# Patient Record
Sex: FEMALE | Race: WHITE | NOT HISPANIC OR LATINO | Employment: OTHER | URBAN - METROPOLITAN AREA
[De-identification: names, ages, dates, MRNs, and addresses within clinical notes are randomized per-mention and may not be internally consistent; named-entity substitution may affect disease eponyms.]

---

## 2017-05-03 ENCOUNTER — LAB (OUTPATIENT)
Dept: LAB | Facility: HOSPITAL | Age: 82
End: 2017-05-03

## 2017-05-03 DIAGNOSIS — E78.49 OTHER HYPERLIPIDEMIA: Primary | ICD-10-CM

## 2017-05-03 LAB
ALBUMIN SERPL-MCNC: 4.3 G/DL (ref 3.5–5.2)
ALBUMIN/GLOB SERPL: 1.4 G/DL
ALP SERPL-CCNC: 86 U/L (ref 39–117)
ALT SERPL W P-5'-P-CCNC: 19 U/L (ref 1–33)
ANION GAP SERPL CALCULATED.3IONS-SCNC: 13.4 MMOL/L
AST SERPL-CCNC: 27 U/L (ref 1–32)
BASOPHILS # BLD AUTO: 0.02 10*3/MM3 (ref 0–0.2)
BASOPHILS NFR BLD AUTO: 0.4 % (ref 0–1.5)
BILIRUB SERPL-MCNC: 0.5 MG/DL (ref 0.1–1.2)
BUN BLD-MCNC: 15 MG/DL (ref 8–23)
BUN/CREAT SERPL: 18.3 (ref 7–25)
CALCIUM SPEC-SCNC: 10.2 MG/DL (ref 8.6–10.5)
CHLORIDE SERPL-SCNC: 103 MMOL/L (ref 98–107)
CHOLEST SERPL-MCNC: 175 MG/DL (ref 0–200)
CO2 SERPL-SCNC: 25.6 MMOL/L (ref 22–29)
CREAT BLD-MCNC: 0.82 MG/DL (ref 0.57–1)
DEPRECATED RDW RBC AUTO: 42.1 FL (ref 37–54)
EOSINOPHIL # BLD AUTO: 0.21 10*3/MM3 (ref 0–0.7)
EOSINOPHIL NFR BLD AUTO: 4 % (ref 0.3–6.2)
ERYTHROCYTE [DISTWIDTH] IN BLOOD BY AUTOMATED COUNT: 12.5 % (ref 11.7–13)
GFR SERPL CREATININE-BSD FRML MDRD: 67 ML/MIN/1.73
GLOBULIN UR ELPH-MCNC: 3.1 GM/DL
GLUCOSE BLD-MCNC: 91 MG/DL (ref 65–99)
HCT VFR BLD AUTO: 39.6 % (ref 35.6–45.5)
HDLC SERPL-MCNC: 67 MG/DL (ref 40–60)
HGB BLD-MCNC: 12.9 G/DL (ref 11.9–15.5)
IMM GRANULOCYTES # BLD: 0 10*3/MM3 (ref 0–0.03)
IMM GRANULOCYTES NFR BLD: 0 % (ref 0–0.5)
LDLC SERPL CALC-MCNC: 88 MG/DL (ref 0–100)
LDLC/HDLC SERPL: 1.32 {RATIO}
LYMPHOCYTES # BLD AUTO: 1.78 10*3/MM3 (ref 0.9–4.8)
LYMPHOCYTES NFR BLD AUTO: 34 % (ref 19.6–45.3)
MCH RBC QN AUTO: 30.1 PG (ref 26.9–32)
MCHC RBC AUTO-ENTMCNC: 32.6 G/DL (ref 32.4–36.3)
MCV RBC AUTO: 92.3 FL (ref 80.5–98.2)
MONOCYTES # BLD AUTO: 0.31 10*3/MM3 (ref 0.2–1.2)
MONOCYTES NFR BLD AUTO: 5.9 % (ref 5–12)
NEUTROPHILS # BLD AUTO: 2.92 10*3/MM3 (ref 1.9–8.1)
NEUTROPHILS NFR BLD AUTO: 55.7 % (ref 42.7–76)
PLATELET # BLD AUTO: 322 10*3/MM3 (ref 140–500)
PMV BLD AUTO: 9.4 FL (ref 6–12)
POTASSIUM BLD-SCNC: 4.3 MMOL/L (ref 3.5–5.2)
PROT SERPL-MCNC: 7.4 G/DL (ref 6–8.5)
RBC # BLD AUTO: 4.29 10*6/MM3 (ref 3.9–5.2)
SODIUM BLD-SCNC: 142 MMOL/L (ref 136–145)
TRIGL SERPL-MCNC: 99 MG/DL (ref 0–150)
VLDLC SERPL-MCNC: 19.8 MG/DL (ref 5–40)
WBC NRBC COR # BLD: 5.24 10*3/MM3 (ref 4.5–10.7)

## 2017-05-03 PROCEDURE — 85025 COMPLETE CBC W/AUTO DIFF WBC: CPT

## 2017-05-03 PROCEDURE — 80053 COMPREHEN METABOLIC PANEL: CPT

## 2017-05-03 PROCEDURE — 36415 COLL VENOUS BLD VENIPUNCTURE: CPT

## 2017-05-03 PROCEDURE — 80061 LIPID PANEL: CPT

## 2017-05-12 ENCOUNTER — TRANSCRIBE ORDERS (OUTPATIENT)
Dept: ADMINISTRATIVE | Facility: HOSPITAL | Age: 82
End: 2017-05-12

## 2017-05-12 DIAGNOSIS — Z12.31 ENCOUNTER FOR SCREENING MAMMOGRAM FOR MALIGNANT NEOPLASM OF BREAST: Primary | ICD-10-CM

## 2017-05-22 ENCOUNTER — HOSPITAL ENCOUNTER (OUTPATIENT)
Dept: MAMMOGRAPHY | Facility: HOSPITAL | Age: 82
Discharge: HOME | End: 2017-05-22
Attending: FAMILY MEDICINE | Admitting: FAMILY MEDICINE

## 2017-05-22 DIAGNOSIS — Z12.31 ENCOUNTER FOR SCREENING MAMMOGRAM FOR MALIGNANT NEOPLASM OF BREAST: ICD-10-CM

## 2017-05-22 PROCEDURE — 77063 BREAST TOMOSYNTHESIS BI: CPT

## 2017-05-22 PROCEDURE — G0202 SCR MAMMO BI INCL CAD: HCPCS

## 2017-06-07 RX ORDER — CYCLOBENZAPRINE HCL 10 MG
TABLET ORAL
Qty: 90 TABLET | Refills: 0 | Status: SHIPPED | OUTPATIENT
Start: 2017-06-07 | End: 2017-07-24 | Stop reason: SDUPTHER

## 2017-07-17 ENCOUNTER — OFFICE VISIT (OUTPATIENT)
Dept: FAMILY MEDICINE CLINIC | Facility: CLINIC | Age: 82
End: 2017-07-17

## 2017-07-17 VITALS
BODY MASS INDEX: 33.77 KG/M2 | DIASTOLIC BLOOD PRESSURE: 74 MMHG | OXYGEN SATURATION: 97 % | TEMPERATURE: 98.3 F | HEIGHT: 64 IN | SYSTOLIC BLOOD PRESSURE: 130 MMHG | HEART RATE: 76 BPM | WEIGHT: 197.8 LBS

## 2017-07-17 DIAGNOSIS — E78.5 HYPERLIPIDEMIA LDL GOAL <130: Primary | ICD-10-CM

## 2017-07-17 DIAGNOSIS — M47.818 SI JOINT ARTHRITIS: ICD-10-CM

## 2017-07-17 DIAGNOSIS — D49.2 ABNORMAL SKIN GROWTH: ICD-10-CM

## 2017-07-17 PROCEDURE — 99213 OFFICE O/P EST LOW 20 MIN: CPT | Performed by: FAMILY MEDICINE

## 2017-07-17 NOTE — PROGRESS NOTES
Chief Complaint and HPI  I have reviewed the patient's medical history in detail and updated the comp  Hyperlipidemia (follow up -no problem with Lipitor); Osteoarthritis (Meloxicam withoutmproblem); and Rash (both arms)      Review of Systems   Constitutional: Negative for chills, fatigue, fever and unexpected weight change.   HENT: Negative for ear pain, hearing loss, sinus pressure, sore throat and tinnitus.    Eyes: Negative for pain, discharge and redness.   Respiratory: Negative for cough, shortness of breath and wheezing.    Cardiovascular: Negative for chest pain, palpitations and leg swelling.   Gastrointestinal: Negative for abdominal pain, constipation, diarrhea and nausea.   Endocrine: Negative for cold intolerance and heat intolerance.   Genitourinary: Negative for difficulty urinating, flank pain and urgency.   Musculoskeletal: Negative for back pain, joint swelling and myalgias.   Skin: Negative for rash and wound.   Allergic/Immunologic: Negative for environmental allergies and food allergies.   Neurological: Negative for dizziness, seizures, numbness and headaches.   Hematological: Negative for adenopathy. Does not bruise/bleed easily.   Psychiatric/Behavioral: Negative for decreased concentration, dysphoric mood and sleep disturbance. The patient is not nervous/anxious.    All other systems reviewed and are negative.      Physical Exam   Constitutional: She appears well-developed and well-nourished.   Cardiovascular: Normal rate, regular rhythm, normal heart sounds and intact distal pulses.    Pulmonary/Chest: Effort normal and breath sounds normal.   Skin:   Larm with red irritrd 1cm2 lesion   Vitals reviewed.      Procedures    Assessment:   Diagnosis Plan   1. Hyperlipidemia LDL goal <130     2. Abnormal skin growth     3. SI joint arthritis         Plan:  No orders of the defined types were placed in this encounter.      Requested Prescriptions      No prescriptions requested or ordered  in this encounter       All tests and consults since last visit reviewed with patient    No Follow-up on file.

## 2017-07-24 RX ORDER — MELOXICAM 15 MG/1
15 TABLET ORAL DAILY
Qty: 90 TABLET | Refills: 1 | Status: SHIPPED | OUTPATIENT
Start: 2017-07-24 | End: 2018-09-25

## 2017-07-24 RX ORDER — ATORVASTATIN CALCIUM 10 MG/1
10 TABLET, FILM COATED ORAL DAILY
Qty: 90 TABLET | Refills: 1 | Status: SHIPPED | OUTPATIENT
Start: 2017-07-24 | End: 2018-03-29 | Stop reason: SDUPTHER

## 2017-07-24 RX ORDER — CYCLOBENZAPRINE HCL 10 MG
10 TABLET ORAL 3 TIMES DAILY
Qty: 90 TABLET | Refills: 0 | Status: SHIPPED | OUTPATIENT
Start: 2017-07-24 | End: 2017-10-18 | Stop reason: SDUPTHER

## 2017-08-07 ENCOUNTER — PROCEDURE VISIT (OUTPATIENT)
Dept: FAMILY MEDICINE CLINIC | Facility: CLINIC | Age: 82
End: 2017-08-07

## 2017-08-07 VITALS
WEIGHT: 197.4 LBS | BODY MASS INDEX: 33.7 KG/M2 | HEIGHT: 64 IN | TEMPERATURE: 98.1 F | SYSTOLIC BLOOD PRESSURE: 134 MMHG | DIASTOLIC BLOOD PRESSURE: 60 MMHG

## 2017-08-07 DIAGNOSIS — D49.2 ABNORMAL SKIN GROWTH: Primary | ICD-10-CM

## 2017-08-07 PROCEDURE — 11300 SHAVE SKIN LESION 0.5 CM/<: CPT | Performed by: FAMILY MEDICINE

## 2017-08-07 NOTE — PROGRESS NOTES
Subjective.../ HPI  I have reviewed the patient's medical history in detail and updated the computerized patient record.    Subjective: Shanique Martinez is a 81 y.o. female presents here today for-    Mass (removal from left arm)      Family History   Problem Relation Age of Onset   • Heart disease Mother    • Heart disease Father    • Cancer Sister    • Stroke Sister    • No Known Problems Brother    • No Known Problems Daughter    • No Known Problems Son    • No Known Problems Maternal Grandmother    • No Known Problems Paternal Grandmother    • No Known Problems Maternal Aunt    • No Known Problems Paternal Aunt    • BRCA 1/2 Neg Hx    • Breast cancer Neg Hx    • Colon cancer Neg Hx    • Endometrial cancer Neg Hx    • Ovarian cancer Neg Hx        Social History     Social History   • Marital status:      Spouse name: N/A   • Number of children: N/A   • Years of education: N/A     Occupational History   • Not on file.     Social History Main Topics   • Smoking status: Never Smoker   • Smokeless tobacco: Never Used   • Alcohol use No   • Drug use: No   • Sexual activity: Not on file     Other Topics Concern   • Not on file     Social History Narrative       Review of Systems   Constitutional: Negative for chills, fatigue, fever and unexpected weight change.   HENT: Negative for ear pain, hearing loss, sinus pressure, sore throat and tinnitus.    Eyes: Negative for pain, discharge and redness.   Respiratory: Negative for cough, shortness of breath and wheezing.    Cardiovascular: Negative for chest pain, palpitations and leg swelling.   Gastrointestinal: Negative for abdominal pain, constipation, diarrhea and nausea.   Endocrine: Negative for cold intolerance and heat intolerance.   Genitourinary: Negative for difficulty urinating, flank pain and urgency.   Musculoskeletal: Negative for back pain, joint swelling and myalgias.   Skin: Negative for rash and wound.   Allergic/Immunologic: Negative for  environmental allergies and food allergies.   Neurological: Negative for dizziness, seizures, numbness and headaches.   Hematological: Negative for adenopathy. Does not bruise/bleed easily.   Psychiatric/Behavioral: Negative for decreased concentration, dysphoric mood and sleep disturbance. The patient is not nervous/anxious.    All other systems reviewed and are negative.      Physical Exam   Constitutional: She appears well-developed and well-nourished.   Cardiovascular: Normal rate, regular rhythm, normal heart sounds and intact distal pulses.    Pulmonary/Chest: Effort normal and breath sounds normal.   Skin:   L forearm witjh 1.5cm2 shinny lesion   Vitals reviewed.    Biopsy  Date/Time: 8/7/2017 2:22 PM  Performed by: MARIA LUISA LINDSAY  Authorized by: MARIA LUISA LINDSAY   Preparation: Patient was prepped and draped in the usual sterile fashion.  Local anesthesia used: yes    Anesthesia:  Local anesthesia used: yes  Local Anesthetic: lidocaine 1% with epinephrine  Patient tolerance: Patient tolerated the procedure well with no immediate complications  Comments: L forearm 1.5 cm2 shiny lesion shave bx and specimen sent to lab            Shanique was seen today for mass.    Diagnoses and all orders for this visit:    Abnormal skin growth    Other orders  -     Biopsy      Requested Prescriptions      No prescriptions requested or ordered in this encounter       Results Review:    REVIEWED AND DISCUSSED CLINICAL RESULTS WITH PATIENT    No Follow-up on file.

## 2017-08-14 ENCOUNTER — TELEPHONE (OUTPATIENT)
Dept: FAMILY MEDICINE CLINIC | Facility: CLINIC | Age: 82
End: 2017-08-14

## 2017-08-14 LAB
DX ICD CODE: NORMAL
DX ICD CODE: NORMAL
PATH REPORT.FINAL DX SPEC: NORMAL
PATH REPORT.GROSS SPEC: NORMAL
PATH REPORT.SITE OF ORIGIN SPEC: NORMAL
PATHOLOGIST NAME: NORMAL
PAYMENT PROCEDURE: NORMAL

## 2017-08-14 NOTE — TELEPHONE ENCOUNTER
----- Message from Lucia Juares sent at 8/14/2017 11:01 AM EDT -----  [phone]    BIOPSY RESULTS    PT AWARE DR IS NOT IN THE OFFICE TODAY AND TO ALLOW 24-48 HOURS BEFORE A CALL BACK

## 2017-08-17 ENCOUNTER — TELEPHONE (OUTPATIENT)
Dept: FAMILY MEDICINE CLINIC | Facility: CLINIC | Age: 82
End: 2017-08-17

## 2017-08-17 NOTE — TELEPHONE ENCOUNTER
----- Message from Lucia Juares sent at 8/17/2017 10:17 AM EDT -----  [phone]    BIOPSY RESULTS     2ND CALL PT IS AWARE THAT THE DR WAS NOT HERE Monday AND OUT OF THE OFFICE TODAY

## 2017-10-19 RX ORDER — CYCLOBENZAPRINE HCL 10 MG
TABLET ORAL
Qty: 90 TABLET | Refills: 0 | Status: SHIPPED | OUTPATIENT
Start: 2017-10-19 | End: 2019-06-25 | Stop reason: SDUPTHER

## 2018-03-22 DIAGNOSIS — E78.5 HYPERLIPIDEMIA LDL GOAL <130: Primary | ICD-10-CM

## 2018-03-22 DIAGNOSIS — M47.818 SI JOINT ARTHRITIS: ICD-10-CM

## 2018-03-22 DIAGNOSIS — Z79.899 ENCOUNTER FOR LONG-TERM (CURRENT) USE OF MEDICATIONS: ICD-10-CM

## 2018-03-22 DIAGNOSIS — Z00.00 HEALTH MAINTENANCE EXAMINATION: ICD-10-CM

## 2018-03-27 LAB
ALBUMIN SERPL-MCNC: 4.6 G/DL (ref 3.5–5.2)
ALBUMIN/GLOB SERPL: 1.7 G/DL
ALP SERPL-CCNC: 102 U/L (ref 39–117)
ALT SERPL-CCNC: 17 U/L (ref 1–33)
AST SERPL-CCNC: 23 U/L (ref 1–32)
BASOPHILS # BLD AUTO: 0.02 10*3/MM3 (ref 0–0.2)
BASOPHILS NFR BLD AUTO: 0.3 % (ref 0–1.5)
BILIRUB SERPL-MCNC: 0.4 MG/DL (ref 0.1–1.2)
BUN SERPL-MCNC: 16 MG/DL (ref 8–23)
BUN/CREAT SERPL: 18.4 (ref 7–25)
CALCIUM SERPL-MCNC: 10.6 MG/DL (ref 8.6–10.5)
CHLORIDE SERPL-SCNC: 102 MMOL/L (ref 98–107)
CHOLEST SERPL-MCNC: 179 MG/DL (ref 0–200)
CO2 SERPL-SCNC: 26 MMOL/L (ref 22–29)
CREAT SERPL-MCNC: 0.87 MG/DL (ref 0.57–1)
EOSINOPHIL # BLD AUTO: 0.18 10*3/MM3 (ref 0–0.7)
EOSINOPHIL NFR BLD AUTO: 3.1 % (ref 0.3–6.2)
ERYTHROCYTE [DISTWIDTH] IN BLOOD BY AUTOMATED COUNT: 12.8 % (ref 11.7–13)
FT4I SERPL CALC-MCNC: 1.3 (ref 1.2–4.9)
GFR SERPLBLD CREATININE-BSD FMLA CKD-EPI: 62 ML/MIN/1.73
GFR SERPLBLD CREATININE-BSD FMLA CKD-EPI: 76 ML/MIN/1.73
GLOBULIN SER CALC-MCNC: 2.7 GM/DL
GLUCOSE SERPL-MCNC: 94 MG/DL (ref 65–99)
HBA1C MFR BLD: 5.3 % (ref 4.8–5.6)
HCT VFR BLD AUTO: 41.2 % (ref 35.6–45.5)
HDLC SERPL-MCNC: 70 MG/DL (ref 40–60)
HGB BLD-MCNC: 13.3 G/DL (ref 11.9–15.5)
IMM GRANULOCYTES # BLD: 0 10*3/MM3 (ref 0–0.03)
IMM GRANULOCYTES NFR BLD: 0 % (ref 0–0.5)
LDLC SERPL CALC-MCNC: 89 MG/DL (ref 0–100)
LDLC/HDLC SERPL: 1.27 {RATIO}
LYMPHOCYTES # BLD AUTO: 1.79 10*3/MM3 (ref 0.9–4.8)
LYMPHOCYTES NFR BLD AUTO: 30.7 % (ref 19.6–45.3)
MCH RBC QN AUTO: 30.9 PG (ref 26.9–32)
MCHC RBC AUTO-ENTMCNC: 32.3 G/DL (ref 32.4–36.3)
MCV RBC AUTO: 95.8 FL (ref 80.5–98.2)
MONOCYTES # BLD AUTO: 0.4 10*3/MM3 (ref 0.2–1.2)
MONOCYTES NFR BLD AUTO: 6.8 % (ref 5–12)
NEUTROPHILS # BLD AUTO: 3.45 10*3/MM3 (ref 1.9–8.1)
NEUTROPHILS NFR BLD AUTO: 59.1 % (ref 42.7–76)
PLATELET # BLD AUTO: 360 10*3/MM3 (ref 140–500)
POTASSIUM SERPL-SCNC: 5.1 MMOL/L (ref 3.5–5.2)
PROT SERPL-MCNC: 7.3 G/DL (ref 6–8.5)
RBC # BLD AUTO: 4.3 10*6/MM3 (ref 3.9–5.2)
SODIUM SERPL-SCNC: 143 MMOL/L (ref 136–145)
T3RU NFR SERPL: 22 % (ref 24–39)
T4 SERPL-MCNC: 5.8 UG/DL (ref 4.5–12)
TRIGL SERPL-MCNC: 101 MG/DL (ref 0–150)
TSH SERPL DL<=0.005 MIU/L-ACNC: 4.43 UIU/ML (ref 0.45–4.5)
VLDLC SERPL CALC-MCNC: 20.2 MG/DL (ref 5–40)
WBC # BLD AUTO: 5.84 10*3/MM3 (ref 4.5–10.7)

## 2018-03-29 ENCOUNTER — OFFICE VISIT (OUTPATIENT)
Dept: INTERNAL MEDICINE | Facility: CLINIC | Age: 83
End: 2018-03-29

## 2018-03-29 VITALS
HEART RATE: 79 BPM | OXYGEN SATURATION: 98 % | HEIGHT: 64 IN | DIASTOLIC BLOOD PRESSURE: 74 MMHG | BODY MASS INDEX: 33.63 KG/M2 | SYSTOLIC BLOOD PRESSURE: 144 MMHG | TEMPERATURE: 98.3 F | WEIGHT: 197 LBS

## 2018-03-29 DIAGNOSIS — E78.5 HYPERLIPIDEMIA LDL GOAL <130: Primary | ICD-10-CM

## 2018-03-29 DIAGNOSIS — M47.818 SI JOINT ARTHRITIS: ICD-10-CM

## 2018-03-29 PROCEDURE — 99213 OFFICE O/P EST LOW 20 MIN: CPT | Performed by: FAMILY MEDICINE

## 2018-03-29 RX ORDER — ATORVASTATIN CALCIUM 10 MG/1
10 TABLET, FILM COATED ORAL DAILY
Qty: 90 TABLET | Refills: 1 | Status: SHIPPED | OUTPATIENT
Start: 2018-03-29 | End: 2018-06-28 | Stop reason: SDUPTHER

## 2018-03-29 RX ORDER — ATORVASTATIN CALCIUM 10 MG/1
10 TABLET, FILM COATED ORAL DAILY
Qty: 90 TABLET | Refills: 1 | Status: SHIPPED | OUTPATIENT
Start: 2018-03-29 | End: 2018-03-29 | Stop reason: SDUPTHER

## 2018-03-29 NOTE — PROGRESS NOTES
CC:lipids ,arthritis.L wrist    Subjective.../HPI    requesting refills of medications    I have reviewed the patient's medical history in detail and updated the computerized patient record. 1) Shanique has a history of chronic hyperlipidemia and has been well controlled on current medications.  Patient reports has had hyperlipidemia for 5 years. She is tolerating medications without side effect.  Hyperlipidemia labs will be reviewed today   .  2) arthritis- on meloxicam    Past Medical History:   Diagnosis Date   • Arthritis    • Cancer    • History of bone density study 2008   • History of mammogram 11/01/2013   • History of mammogram 09/23/2011   • Hyperlipidemia    • Hypertension     HBP with hypertension unspecified       Past Surgical History:   Procedure Laterality Date   • COLONOSCOPY  2006   • HYSTERECTOMY         Family History   Problem Relation Age of Onset   • Heart disease Mother    • Heart disease Father    • Cancer Sister    • Stroke Sister    • No Known Problems Brother    • No Known Problems Daughter    • No Known Problems Son    • No Known Problems Maternal Grandmother    • No Known Problems Paternal Grandmother    • No Known Problems Maternal Aunt    • No Known Problems Paternal Aunt    • BRCA 1/2 Neg Hx    • Breast cancer Neg Hx    • Colon cancer Neg Hx    • Endometrial cancer Neg Hx    • Ovarian cancer Neg Hx        Social History     Social History   • Marital status:      Spouse name: N/A   • Number of children: N/A   • Years of education: N/A     Occupational History   • Not on file.     Social History Main Topics   • Smoking status: Never Smoker   • Smokeless tobacco: Never Used   • Alcohol use No   • Drug use: No   • Sexual activity: Not on file     Other Topics Concern   • Not on file     Social History Narrative   • No narrative on file       Most Recent Immunizations   Administered Date(s) Administered   • Flu Vaccine High Dose PF 65YR+ 10/07/2016   • Flu Vaccine Quad PF >18YRS  "09/08/2014   • Influenza, Unspecified 10/01/2013   • Pneumococcal Polysaccharide (PPSV23) 10/02/2012   • Zoster 02/06/2013       Review of Systems:   Review of Systems   Constitutional: Negative.    Respiratory: Negative.    Gastrointestinal: Negative.    Endocrine: Negative.    Genitourinary: Negative.    Musculoskeletal: Negative.    Skin: Negative.    Allergic/Immunologic: Negative.    Neurological: Negative.    Hematological: Negative.    Psychiatric/Behavioral: Negative.          Physical Exam   Constitutional: She is oriented to person, place, and time. She appears well-developed and well-nourished.   Cardiovascular: Normal rate, regular rhythm, normal heart sounds and intact distal pulses.    Pulmonary/Chest: Effort normal and breath sounds normal.   Abdominal: Soft.   Neurological: She is alert and oriented to person, place, and time.   Psychiatric: She has a normal mood and affect. Her behavior is normal.         Vital Signs     Vitals:    03/29/18 1605   BP: 144/74   BP Location: Left arm   Patient Position: Sitting   Cuff Size: Adult   Pulse: 79   Temp: 98.3 °F (36.8 °C)   TempSrc: Oral   SpO2: 98%   Weight: 89.4 kg (197 lb)   Height: 162.6 cm (64.02\")          Results Review:      REVIEWED AND DISCUSSED CLINICAL RESULTS WITH PATIENT      Requested Prescriptions      No prescriptions requested or ordered in this encounter         Current Outpatient Prescriptions:   •  aspirin 81 MG tablet, Take 81 mg by mouth Every Morning., Disp: , Rfl:   •  atorvastatin (LIPITOR) 10 MG tablet, Take 1 tablet by mouth Daily., Disp: 90 tablet, Rfl: 1  •  CALCIUM PO, Take 1 tablet by mouth Daily., Disp: , Rfl:   •  cyclobenzaprine (FLEXERIL) 10 MG tablet, TAKE 1 TABLET THREE TIMES A DAY (NEED TO BE SEEN), Disp: 90 tablet, Rfl: 0  •  meloxicam (MOBIC) 15 MG tablet, Take 1 tablet by mouth Daily., Disp: 90 tablet, Rfl: 1  •  prednisoLONE acetate (PRED FORTE) 1 % ophthalmic suspension, Apply 1 drop to eye Every Other Day. (at " bedtime), Disp: , Rfl:     Current Facility-Administered Medications:   •  bupivacaine (PF) (MARCAINE) 0.5 % injection 0.5 mL, 0.5 mL, Injection, Once, Germain Brady MD  •  triamcinolone acetonide (KENALOG-40) injection 40 mg, 40 mg, Intramuscular, Once, Germain Brady MD    Procedures          There are no diagnoses linked to this encounter.     No Follow-up on file.    Germain Brady M.D  03/29/18  4:10 PM

## 2018-05-14 ENCOUNTER — TRANSCRIBE ORDERS (OUTPATIENT)
Dept: ADMINISTRATIVE | Facility: HOSPITAL | Age: 83
End: 2018-05-14

## 2018-05-14 DIAGNOSIS — Z12.39 SCREENING BREAST EXAMINATION: Primary | ICD-10-CM

## 2018-06-05 ENCOUNTER — HOSPITAL ENCOUNTER (OUTPATIENT)
Dept: MAMMOGRAPHY | Facility: HOSPITAL | Age: 83
Discharge: HOME | End: 2018-06-05
Attending: FAMILY MEDICINE | Admitting: FAMILY MEDICINE

## 2018-06-05 DIAGNOSIS — Z12.39 SCREENING BREAST EXAMINATION: ICD-10-CM

## 2018-06-05 PROCEDURE — 77063 BREAST TOMOSYNTHESIS BI: CPT

## 2018-06-05 PROCEDURE — 77067 SCR MAMMO BI INCL CAD: CPT

## 2018-06-28 DIAGNOSIS — E78.5 HYPERLIPIDEMIA LDL GOAL <130: ICD-10-CM

## 2018-06-28 RX ORDER — ATORVASTATIN CALCIUM 10 MG/1
TABLET, FILM COATED ORAL
Qty: 90 TABLET | Refills: 1 | Status: SHIPPED | OUTPATIENT
Start: 2018-06-28 | End: 2020-06-30 | Stop reason: SDUPTHER

## 2018-09-14 DIAGNOSIS — E78.5 HYPERLIPIDEMIA LDL GOAL <130: Primary | ICD-10-CM

## 2018-09-14 DIAGNOSIS — R73.09 INCREASED GLUCOSE LEVEL: ICD-10-CM

## 2018-09-14 DIAGNOSIS — Z79.899 ENCOUNTER FOR LONG-TERM (CURRENT) USE OF MEDICATIONS: ICD-10-CM

## 2018-09-17 LAB
ALBUMIN SERPL-MCNC: 4.7 G/DL (ref 3.5–5.2)
ALBUMIN/GLOB SERPL: 2 G/DL
ALP SERPL-CCNC: 105 U/L (ref 39–117)
ALT SERPL-CCNC: 18 U/L (ref 1–33)
AST SERPL-CCNC: 21 U/L (ref 1–32)
BASOPHILS # BLD AUTO: 0.04 10*3/MM3 (ref 0–0.2)
BASOPHILS NFR BLD AUTO: 0.7 % (ref 0–1.5)
BILIRUB SERPL-MCNC: 0.4 MG/DL (ref 0.1–1.2)
BUN SERPL-MCNC: 18 MG/DL (ref 8–23)
BUN/CREAT SERPL: 23.1 (ref 7–25)
CALCIUM SERPL-MCNC: 9.9 MG/DL (ref 8.6–10.5)
CHLORIDE SERPL-SCNC: 103 MMOL/L (ref 98–107)
CHOLEST SERPL-MCNC: 180 MG/DL (ref 0–200)
CO2 SERPL-SCNC: 27.1 MMOL/L (ref 22–29)
CREAT SERPL-MCNC: 0.78 MG/DL (ref 0.57–1)
EOSINOPHIL # BLD AUTO: 0.21 10*3/MM3 (ref 0–0.7)
EOSINOPHIL NFR BLD AUTO: 3.7 % (ref 0.3–6.2)
ERYTHROCYTE [DISTWIDTH] IN BLOOD BY AUTOMATED COUNT: 12.5 % (ref 11.7–13)
GLOBULIN SER CALC-MCNC: 2.3 GM/DL
GLUCOSE SERPL-MCNC: 86 MG/DL (ref 65–99)
HBA1C MFR BLD: 5.5 % (ref 4.8–5.6)
HCT VFR BLD AUTO: 40.9 % (ref 35.6–45.5)
HDLC SERPL-MCNC: 72 MG/DL (ref 40–60)
HGB BLD-MCNC: 12.4 G/DL (ref 11.9–15.5)
IMM GRANULOCYTES # BLD: 0 10*3/MM3 (ref 0–0.03)
IMM GRANULOCYTES NFR BLD: 0 % (ref 0–0.5)
LDLC SERPL CALC-MCNC: 90 MG/DL (ref 0–100)
LDLC/HDLC SERPL: 1.25 {RATIO}
LYMPHOCYTES # BLD AUTO: 1.72 10*3/MM3 (ref 0.9–4.8)
LYMPHOCYTES NFR BLD AUTO: 30 % (ref 19.6–45.3)
MCH RBC QN AUTO: 28.8 PG (ref 26.9–32)
MCHC RBC AUTO-ENTMCNC: 30.3 G/DL (ref 32.4–36.3)
MCV RBC AUTO: 95.1 FL (ref 80.5–98.2)
MONOCYTES # BLD AUTO: 0.38 10*3/MM3 (ref 0.2–1.2)
MONOCYTES NFR BLD AUTO: 6.6 % (ref 5–12)
NEUTROPHILS # BLD AUTO: 3.38 10*3/MM3 (ref 1.9–8.1)
NEUTROPHILS NFR BLD AUTO: 59 % (ref 42.7–76)
PLATELET # BLD AUTO: 358 10*3/MM3 (ref 140–500)
POTASSIUM SERPL-SCNC: 4.5 MMOL/L (ref 3.5–5.2)
PROT SERPL-MCNC: 7 G/DL (ref 6–8.5)
RBC # BLD AUTO: 4.3 10*6/MM3 (ref 3.9–5.2)
SODIUM SERPL-SCNC: 141 MMOL/L (ref 136–145)
TRIGL SERPL-MCNC: 91 MG/DL (ref 0–150)
VLDLC SERPL CALC-MCNC: 18.2 MG/DL (ref 5–40)
WBC # BLD AUTO: 5.73 10*3/MM3 (ref 4.5–10.7)

## 2018-09-19 ENCOUNTER — RESULTS ENCOUNTER (OUTPATIENT)
Dept: INTERNAL MEDICINE | Facility: CLINIC | Age: 83
End: 2018-09-19

## 2018-09-19 DIAGNOSIS — Z79.899 ENCOUNTER FOR LONG-TERM (CURRENT) USE OF MEDICATIONS: ICD-10-CM

## 2018-09-19 DIAGNOSIS — E78.5 HYPERLIPIDEMIA LDL GOAL <130: ICD-10-CM

## 2018-09-19 DIAGNOSIS — R73.09 INCREASED GLUCOSE LEVEL: ICD-10-CM

## 2018-09-25 ENCOUNTER — OFFICE VISIT (OUTPATIENT)
Dept: INTERNAL MEDICINE | Facility: CLINIC | Age: 83
End: 2018-09-25

## 2018-09-25 VITALS
HEIGHT: 64 IN | BODY MASS INDEX: 34.49 KG/M2 | WEIGHT: 202 LBS | OXYGEN SATURATION: 96 % | HEART RATE: 77 BPM | DIASTOLIC BLOOD PRESSURE: 83 MMHG | SYSTOLIC BLOOD PRESSURE: 150 MMHG | TEMPERATURE: 97.9 F

## 2018-09-25 DIAGNOSIS — E78.5 HYPERLIPIDEMIA LDL GOAL <130: Primary | ICD-10-CM

## 2018-09-25 DIAGNOSIS — Z79.899 ENCOUNTER FOR LONG-TERM (CURRENT) DRUG USE: ICD-10-CM

## 2018-09-25 DIAGNOSIS — M19.90 ARTHRITIS: ICD-10-CM

## 2018-09-25 PROCEDURE — 99213 OFFICE O/P EST LOW 20 MIN: CPT | Performed by: FAMILY MEDICINE

## 2018-09-25 NOTE — PROGRESS NOTES
CC:lipids,arthritis    Subjective.../HPI  Patient present today with 1) Shanique has a history of chronic hyperlipidemia and has been well controlled on current medications.  Patient reports has had hyperlipidemia for 4 years. She is tolerating medications without side effect.  Hyperlipidemia labs will be reviewed today.  2) arthritis-good except for knees    I have reviewed the patient's medical history in detail and updated the computerized patient record.    Past Medical History:   Diagnosis Date   • Arthritis    • Cancer (CMS/[facility])    • History of bone density study 2008   • History of mammogram 11/01/2013   • History of mammogram 09/23/2011   • Hyperlipidemia    • Hypertension     HBP with hypertension unspecified       Past Surgical History:   Procedure Laterality Date   • COLONOSCOPY  2006   • HYSTERECTOMY         Family History   Problem Relation Age of Onset   • Heart disease Mother    • Heart disease Father    • Cancer Sister    • Stroke Sister    • No Known Problems Brother    • No Known Problems Daughter    • No Known Problems Son    • No Known Problems Maternal Grandmother    • No Known Problems Paternal Grandmother    • No Known Problems Maternal Aunt    • No Known Problems Paternal Aunt    • BRCA 1/2 Neg Hx    • Breast cancer Neg Hx    • Colon cancer Neg Hx    • Endometrial cancer Neg Hx    • Ovarian cancer Neg Hx        Social History     Social History   • Marital status:      Spouse name: N/A   • Number of children: N/A   • Years of education: N/A     Occupational History   • Not on file.     Social History Main Topics   • Smoking status: Never Smoker   • Smokeless tobacco: Never Used   • Alcohol use No   • Drug use: No   • Sexual activity: Not on file     Other Topics Concern   • Not on file     Social History Narrative   • No narrative on file       Most Recent Immunizations   Administered Date(s) Administered   • Flu Vaccine High Dose PF 65YR+ 10/07/2016   • Flu Vaccine Quad PF >18YRS  "09/08/2014   • Influenza, Unspecified 10/01/2013   • Pneumococcal Polysaccharide (PPSV23) 10/02/2012   • Zostavax 02/06/2013       Review of Systems:   Review of Systems   Constitutional: Negative.    HENT: Negative.    Eyes: Negative.    Respiratory: Negative.    Cardiovascular: Negative.    Gastrointestinal: Negative.    Endocrine: Negative.    Genitourinary: Negative.    Musculoskeletal: Negative.    Skin: Negative.    Allergic/Immunologic: Negative.    Neurological: Negative.    Hematological: Negative.    Psychiatric/Behavioral: Negative.          Physical Exam   Constitutional: She is oriented to person, place, and time. She appears well-developed and well-nourished.   Cardiovascular: Normal rate, regular rhythm and normal heart sounds.    Pulmonary/Chest: Effort normal and breath sounds normal.   Neurological: She is alert and oriented to person, place, and time.   Skin: Skin is warm and dry.   Psychiatric: She has a normal mood and affect. Her behavior is normal.   Vitals reviewed.        Vital Signs     Vitals:    09/25/18 1032   BP: 150/83   BP Location: Right arm   Patient Position: Sitting   Cuff Size: Small Adult   Pulse: 77   Temp: 97.9 °F (36.6 °C)   TempSrc: Oral   SpO2: 96%   Weight: 91.6 kg (202 lb)   Height: 162.6 cm (64.02\")          Results Review:      REVIEWED AND DISCUSSED LAB RESULTS WITH PATIENT      Requested Prescriptions     Signed Prescriptions Disp Refills   • diclofenac sodium (VOTAREN XR) 100 MG 24 hr tablet 90 tablet 3     Sig: Take 1 tablet by mouth Daily.         Current Outpatient Prescriptions:   •  aspirin 81 MG tablet, Take 81 mg by mouth Every Morning., Disp: , Rfl:   •  atorvastatin (LIPITOR) 10 MG tablet, TAKE 1 TABLET DAILY, Disp: 90 tablet, Rfl: 1  •  CALCIUM PO, Take 1 tablet by mouth Daily., Disp: , Rfl:   •  cyclobenzaprine (FLEXERIL) 10 MG tablet, TAKE 1 TABLET THREE TIMES A DAY (NEED TO BE SEEN), Disp: 90 tablet, Rfl: 0  •  prednisoLONE acetate (PRED FORTE) 1 % " ophthalmic suspension, Apply 1 drop to eye Every Other Day. (at bedtime), Disp: , Rfl:   •  diclofenac sodium (VOTAREN XR) 100 MG 24 hr tablet, Take 1 tablet by mouth Daily., Disp: 90 tablet, Rfl: 3    Current Facility-Administered Medications:   •  bupivacaine (PF) (MARCAINE) 0.5 % injection 0.5 mL, 0.5 mL, Injection, Once, Germain Brady MD  •  triamcinolone acetonide (KENALOG-40) injection 40 mg, 40 mg, Intramuscular, Once, Germain Brady MD    Procedures          Diagnoses and all orders for this visit:    Hyperlipidemia LDL goal <130  -     Comprehensive Metabolic Panel; Future  -     Lipid Panel With LDL / HDL Ratio; Future    Arthritis  -     diclofenac sodium (VOTAREN XR) 100 MG 24 hr tablet; Take 1 tablet by mouth Daily.  -     Comprehensive Metabolic Panel; Future    Encounter for long-term (current) drug use  -     CBC & Differential; Future  -     Comprehensive Metabolic Panel; Future         Return in about 6 months (around 3/25/2019) for Recheck.    Germain Brady M.D  09/25/18  11:09 AM

## 2018-09-30 ENCOUNTER — RESULTS ENCOUNTER (OUTPATIENT)
Dept: INTERNAL MEDICINE | Facility: CLINIC | Age: 83
End: 2018-09-30

## 2018-09-30 DIAGNOSIS — M19.90 ARTHRITIS: ICD-10-CM

## 2018-09-30 DIAGNOSIS — Z79.899 ENCOUNTER FOR LONG-TERM (CURRENT) DRUG USE: ICD-10-CM

## 2018-09-30 DIAGNOSIS — E78.5 HYPERLIPIDEMIA LDL GOAL <130: ICD-10-CM

## 2018-10-03 ENCOUNTER — CLINICAL SUPPORT (OUTPATIENT)
Dept: INTERNAL MEDICINE | Facility: CLINIC | Age: 83
End: 2018-10-03

## 2018-10-03 DIAGNOSIS — Z23 FLU VACCINE NEED: Primary | ICD-10-CM

## 2018-10-03 PROCEDURE — G0008 ADMIN INFLUENZA VIRUS VAC: HCPCS | Performed by: INTERNAL MEDICINE

## 2018-10-03 PROCEDURE — 90662 IIV NO PRSV INCREASED AG IM: CPT | Performed by: INTERNAL MEDICINE

## 2019-03-22 ENCOUNTER — HOSPITAL ENCOUNTER (EMERGENCY)
Facility: HOSPITAL | Age: 84
Discharge: HOME | End: 2019-03-22
Attending: EMERGENCY MEDICINE | Admitting: EMERGENCY MEDICINE

## 2019-03-22 VITALS
BODY MASS INDEX: 34.15 KG/M2 | WEIGHT: 200 LBS | HEIGHT: 64 IN | SYSTOLIC BLOOD PRESSURE: 147 MMHG | RESPIRATION RATE: 18 BRPM | OXYGEN SATURATION: 99 % | TEMPERATURE: 98.1 F | HEART RATE: 81 BPM | DIASTOLIC BLOOD PRESSURE: 94 MMHG

## 2019-03-22 DIAGNOSIS — Z87.39 HISTORY OF CHRONIC BACK PAIN: ICD-10-CM

## 2019-03-22 DIAGNOSIS — S39.012A STRAIN OF LUMBAR PARASPINOUS MUSCLE, INITIAL ENCOUNTER: Primary | ICD-10-CM

## 2019-03-22 PROCEDURE — 96372 THER/PROPH/DIAG INJ SC/IM: CPT

## 2019-03-22 PROCEDURE — 25010000002 KETOROLAC TROMETHAMINE PER 15 MG: Performed by: EMERGENCY MEDICINE

## 2019-03-22 PROCEDURE — 99283 EMERGENCY DEPT VISIT LOW MDM: CPT

## 2019-03-22 RX ORDER — NAPROXEN SODIUM 550 MG/1
550 TABLET ORAL 2 TIMES DAILY WITH MEALS
Qty: 6 TABLET | Refills: 0 | Status: SHIPPED | OUTPATIENT
Start: 2019-03-22 | End: 2019-09-26 | Stop reason: SDUPTHER

## 2019-03-22 RX ORDER — KETOROLAC TROMETHAMINE 30 MG/ML
30 INJECTION, SOLUTION INTRAMUSCULAR; INTRAVENOUS ONCE
Status: COMPLETED | OUTPATIENT
Start: 2019-03-22 | End: 2019-03-22

## 2019-03-22 RX ADMIN — KETOROLAC TROMETHAMINE 30 MG: 30 INJECTION, SOLUTION INTRAMUSCULAR at 16:21

## 2019-03-26 ENCOUNTER — OFFICE VISIT (OUTPATIENT)
Dept: INTERNAL MEDICINE | Facility: CLINIC | Age: 84
End: 2019-03-26

## 2019-03-26 VITALS
WEIGHT: 197 LBS | OXYGEN SATURATION: 99 % | HEIGHT: 64 IN | SYSTOLIC BLOOD PRESSURE: 130 MMHG | BODY MASS INDEX: 33.63 KG/M2 | HEART RATE: 76 BPM | DIASTOLIC BLOOD PRESSURE: 79 MMHG | TEMPERATURE: 97.1 F

## 2019-03-26 DIAGNOSIS — E78.5 HYPERLIPIDEMIA LDL GOAL <130: Primary | ICD-10-CM

## 2019-03-26 DIAGNOSIS — Z79.899 ENCOUNTER FOR LONG-TERM (CURRENT) DRUG USE: ICD-10-CM

## 2019-03-26 DIAGNOSIS — M19.90 ARTHRITIS: ICD-10-CM

## 2019-03-26 DIAGNOSIS — M47.818 SI JOINT ARTHRITIS: ICD-10-CM

## 2019-03-26 PROCEDURE — 99213 OFFICE O/P EST LOW 20 MIN: CPT | Performed by: FAMILY MEDICINE

## 2019-03-26 PROCEDURE — 20610 DRAIN/INJ JOINT/BURSA W/O US: CPT | Performed by: FAMILY MEDICINE

## 2019-03-26 RX ORDER — TRIAMCINOLONE ACETONIDE 40 MG/ML
40 INJECTION, SUSPENSION INTRA-ARTICULAR; INTRAMUSCULAR ONCE
Status: DISCONTINUED | OUTPATIENT
Start: 2019-03-26 | End: 2019-08-14

## 2019-03-27 LAB
ALBUMIN SERPL-MCNC: 4.4 G/DL (ref 3.5–5.2)
ALBUMIN/GLOB SERPL: 1.8 G/DL
ALP SERPL-CCNC: 96 U/L (ref 39–117)
ALT SERPL-CCNC: 25 U/L (ref 1–33)
AST SERPL-CCNC: 19 U/L (ref 1–32)
BASOPHILS # BLD AUTO: 0.03 10*3/MM3 (ref 0–0.2)
BASOPHILS NFR BLD AUTO: 0.4 % (ref 0–1.5)
BILIRUB SERPL-MCNC: 0.4 MG/DL (ref 0.2–1.2)
BUN SERPL-MCNC: 19 MG/DL (ref 8–23)
BUN/CREAT SERPL: 23.2 (ref 7–25)
CALCIUM SERPL-MCNC: 10.2 MG/DL (ref 8.6–10.5)
CHLORIDE SERPL-SCNC: 103 MMOL/L (ref 98–107)
CHOLEST SERPL-MCNC: 192 MG/DL (ref 0–200)
CO2 SERPL-SCNC: 25.8 MMOL/L (ref 22–29)
CREAT SERPL-MCNC: 0.82 MG/DL (ref 0.57–1)
EOSINOPHIL # BLD AUTO: 0.11 10*3/MM3 (ref 0–0.4)
EOSINOPHIL NFR BLD AUTO: 1.4 % (ref 0.3–6.2)
ERYTHROCYTE [DISTWIDTH] IN BLOOD BY AUTOMATED COUNT: 12.2 % (ref 12.3–15.4)
GLOBULIN SER CALC-MCNC: 2.5 GM/DL
GLUCOSE SERPL-MCNC: 103 MG/DL (ref 65–99)
HCT VFR BLD AUTO: 43.6 % (ref 34–46.6)
HDLC SERPL-MCNC: 65 MG/DL (ref 40–60)
HGB BLD-MCNC: 13.5 G/DL (ref 12–15.9)
IMM GRANULOCYTES # BLD AUTO: 0.04 10*3/MM3 (ref 0–0.05)
IMM GRANULOCYTES NFR BLD AUTO: 0.5 % (ref 0–0.5)
LDLC SERPL CALC-MCNC: 112 MG/DL (ref 0–100)
LDLC/HDLC SERPL: 1.72 {RATIO}
LYMPHOCYTES # BLD AUTO: 1.74 10*3/MM3 (ref 0.7–3.1)
LYMPHOCYTES NFR BLD AUTO: 21.8 % (ref 19.6–45.3)
MCH RBC QN AUTO: 31.1 PG (ref 26.6–33)
MCHC RBC AUTO-ENTMCNC: 31 G/DL (ref 31.5–35.7)
MCV RBC AUTO: 100.5 FL (ref 79–97)
MONOCYTES # BLD AUTO: 0.55 10*3/MM3 (ref 0.1–0.9)
MONOCYTES NFR BLD AUTO: 6.9 % (ref 5–12)
NEUTROPHILS # BLD AUTO: 5.5 10*3/MM3 (ref 1.4–7)
NEUTROPHILS NFR BLD AUTO: 69 % (ref 42.7–76)
NRBC BLD AUTO-RTO: 0 /100 WBC (ref 0–0)
PLATELET # BLD AUTO: 420 10*3/MM3 (ref 140–450)
POTASSIUM SERPL-SCNC: 4.7 MMOL/L (ref 3.5–5.2)
PROT SERPL-MCNC: 6.9 G/DL (ref 6–8.5)
RBC # BLD AUTO: 4.34 10*6/MM3 (ref 3.77–5.28)
SODIUM SERPL-SCNC: 143 MMOL/L (ref 136–145)
TRIGL SERPL-MCNC: 75 MG/DL (ref 0–150)
VLDLC SERPL CALC-MCNC: 15 MG/DL (ref 5–40)
WBC # BLD AUTO: 7.97 10*3/MM3 (ref 3.4–10.8)

## 2019-06-25 RX ORDER — CYCLOBENZAPRINE HCL 10 MG
TABLET ORAL
Qty: 270 TABLET | Refills: 4 | Status: SHIPPED | OUTPATIENT
Start: 2019-06-25 | End: 2020-03-17 | Stop reason: SDUPTHER

## 2019-08-05 ENCOUNTER — TRANSCRIBE ORDERS (OUTPATIENT)
Dept: ADMINISTRATIVE | Facility: HOSPITAL | Age: 84
End: 2019-08-05

## 2019-08-05 DIAGNOSIS — Z12.39 SCREENING BREAST EXAMINATION: Primary | ICD-10-CM

## 2019-08-15 RX ORDER — TRIAMCINOLONE ACETONIDE 40 MG/ML
40 INJECTION, SUSPENSION INTRA-ARTICULAR; INTRAMUSCULAR
Status: DISCONTINUED | OUTPATIENT
Start: 2019-08-15 | End: 2019-08-15 | Stop reason: HOSPADM

## 2019-08-15 RX ADMIN — TRIAMCINOLONE ACETONIDE 40 MG: 40 INJECTION, SUSPENSION INTRA-ARTICULAR; INTRAMUSCULAR at 09:28

## 2019-08-21 ENCOUNTER — HOSPITAL ENCOUNTER (OUTPATIENT)
Dept: MAMMOGRAPHY | Facility: HOSPITAL | Age: 84
Discharge: HOME | End: 2019-08-21
Admitting: FAMILY MEDICINE

## 2019-08-21 DIAGNOSIS — Z12.39 SCREENING BREAST EXAMINATION: ICD-10-CM

## 2019-08-21 PROCEDURE — 77063 BREAST TOMOSYNTHESIS BI: CPT

## 2019-08-21 PROCEDURE — 77067 SCR MAMMO BI INCL CAD: CPT

## 2019-09-26 ENCOUNTER — OFFICE VISIT (OUTPATIENT)
Dept: INTERNAL MEDICINE | Facility: CLINIC | Age: 84
End: 2019-09-26

## 2019-09-26 VITALS
BODY MASS INDEX: 33.97 KG/M2 | OXYGEN SATURATION: 99 % | WEIGHT: 199 LBS | SYSTOLIC BLOOD PRESSURE: 147 MMHG | DIASTOLIC BLOOD PRESSURE: 85 MMHG | HEIGHT: 64 IN | TEMPERATURE: 97.7 F | HEART RATE: 83 BPM

## 2019-09-26 DIAGNOSIS — M19.90 ARTHRITIS: Primary | ICD-10-CM

## 2019-09-26 DIAGNOSIS — E78.5 HYPERLIPIDEMIA LDL GOAL <130: ICD-10-CM

## 2019-09-26 DIAGNOSIS — Z79.899 ENCOUNTER FOR LONG-TERM (CURRENT) DRUG USE: ICD-10-CM

## 2019-09-26 PROCEDURE — 99213 OFFICE O/P EST LOW 20 MIN: CPT | Performed by: FAMILY MEDICINE

## 2019-09-26 RX ORDER — NAPROXEN SODIUM 550 MG/1
550 TABLET ORAL 2 TIMES DAILY WITH MEALS
Qty: 60 TABLET | Refills: 6 | Status: SHIPPED | OUTPATIENT
Start: 2019-09-26 | End: 2020-04-30 | Stop reason: SDUPTHER

## 2019-09-26 NOTE — PROGRESS NOTES
CC:arthritis, lipids  Subjective.../HPI  Patient present today withKnee kathyay bad. On Diclofenac- mdid well with Naproxen  2) Shanique has a history of chronic hyperlipidemia and has been well controlled on current medications.  Patient reports has had hyperlipidemia for 4 years. She is tolerating medications without side effect.  Hyperlipidemia labs will be drawn today.    I have reviewed the patient's medical history in detail and updated the computerized patient record.    Past Medical History:   Diagnosis Date   • Arthritis    • Cancer (CMS/[facility])    • History of bone density study 2008   • History of mammogram 11/01/2013   • History of mammogram 09/23/2011   • Hyperlipidemia    • Hypertension     HBP with hypertension unspecified       Past Surgical History:   Procedure Laterality Date   • COLONOSCOPY  2006   • HYSTERECTOMY         Family History   Problem Relation Age of Onset   • Heart disease Mother    • Heart disease Father    • Cancer Sister    • Stroke Sister    • No Known Problems Brother    • No Known Problems Daughter    • No Known Problems Son    • No Known Problems Maternal Grandmother    • No Known Problems Paternal Grandmother    • No Known Problems Maternal Aunt    • No Known Problems Paternal Aunt    • BRCA 1/2 Neg Hx    • Breast cancer Neg Hx    • Colon cancer Neg Hx    • Endometrial cancer Neg Hx    • Ovarian cancer Neg Hx        Social History     Socioeconomic History   • Marital status:      Spouse name: Not on file   • Number of children: Not on file   • Years of education: Not on file   • Highest education level: Not on file   Tobacco Use   • Smoking status: Never Smoker   • Smokeless tobacco: Never Used   Substance and Sexual Activity   • Alcohol use: No   • Drug use: No       Most Recent Immunizations   Administered Date(s) Administered   • Flu Vaccine High Dose PF 65YR+ 10/03/2018   • Flu Vaccine Quad PF >18YRS 09/08/2014   • Influenza, Unspecified 10/01/2013   • Pneumococcal  "Polysaccharide (PPSV23) 10/02/2012   • Zostavax 02/06/2013       Review of Systems:   Review of Systems   Constitutional: Negative.    Eyes: Negative.    All other systems reviewed and are negative.        Physical Exam   Constitutional: She is oriented to person, place, and time. She appears well-developed and well-nourished.   Cardiovascular: Normal rate, regular rhythm and normal heart sounds.   Pulmonary/Chest: Breath sounds normal. She is in respiratory distress.   Neurological: She is alert and oriented to person, place, and time.   Psychiatric: She has a normal mood and affect. Her behavior is normal. Judgment and thought content normal.   Vitals reviewed.        Vital Signs     Vitals:    09/26/19 1023   BP: 147/85   BP Location: Right arm   Patient Position: Sitting   Cuff Size: Small Adult   Pulse: 83   Temp: 97.7 °F (36.5 °C)   TempSrc: Oral   SpO2: 99%   Weight: 90.3 kg (199 lb)   Height: 162.6 cm (64\")          Results Review:      REVIEWED AND DISCUSSED CLINICAL RESULTS WITH PATIENT      Requested Prescriptions     Signed Prescriptions Disp Refills   • naproxen sodium (ANAPROX) 550 MG tablet 60 tablet 6     Sig: Take 1 tablet by mouth 2 (Two) Times a Day With Meals.         Current Outpatient Medications:   •  atorvastatin (LIPITOR) 10 MG tablet, TAKE 1 TABLET DAILY, Disp: 90 tablet, Rfl: 1  •  CALCIUM PO, Take 1 tablet by mouth Daily., Disp: , Rfl:   •  cyclobenzaprine (FLEXERIL) 10 MG tablet, TAKE 1 TABLET THREE TIMES A DAY, Disp: 270 tablet, Rfl: 4  •  naproxen sodium (ANAPROX) 550 MG tablet, Take 1 tablet by mouth 2 (Two) Times a Day With Meals., Disp: 60 tablet, Rfl: 6    Procedures          Diagnoses and all orders for this visit:    Arthritis  -     Comprehensive Metabolic Panel    Encounter for long-term (current) drug use  -     CBC & Differential    Hyperlipidemia LDL goal <130  -     Comprehensive Metabolic Panel  -     Lipid Panel With LDL / HDL Ratio    Other orders  -     Discontinue: " diclofenac sodium (VOTAREN XR) 100 MG 24 hr tablet; Take 100 mg by mouth Daily.  -     naproxen sodium (ANAPROX) 550 MG tablet; Take 1 tablet by mouth 2 (Two) Times a Day With Meals.        There are no Patient Instructions on file for this visit.     Return in about 6 months (around 3/26/2020).    Germain Brady M.D  09/26/19  11:42 AM

## 2019-09-27 LAB
ALBUMIN SERPL-MCNC: 4.7 G/DL (ref 3.5–5.2)
ALBUMIN/GLOB SERPL: 1.8 G/DL
ALP SERPL-CCNC: 99 U/L (ref 39–117)
ALT SERPL-CCNC: 17 U/L (ref 1–33)
AST SERPL-CCNC: 22 U/L (ref 1–32)
BASOPHILS # BLD AUTO: 0.06 10*3/MM3 (ref 0–0.2)
BASOPHILS NFR BLD AUTO: 0.8 % (ref 0–1.5)
BILIRUB SERPL-MCNC: 0.5 MG/DL (ref 0.2–1.2)
BUN SERPL-MCNC: 17 MG/DL (ref 8–23)
BUN/CREAT SERPL: 22.4 (ref 7–25)
CALCIUM SERPL-MCNC: 10.1 MG/DL (ref 8.6–10.5)
CHLORIDE SERPL-SCNC: 102 MMOL/L (ref 98–107)
CHOLEST SERPL-MCNC: 194 MG/DL (ref 0–200)
CO2 SERPL-SCNC: 27.4 MMOL/L (ref 22–29)
CREAT SERPL-MCNC: 0.76 MG/DL (ref 0.57–1)
EOSINOPHIL # BLD AUTO: 0.24 10*3/MM3 (ref 0–0.4)
EOSINOPHIL NFR BLD AUTO: 3 % (ref 0.3–6.2)
ERYTHROCYTE [DISTWIDTH] IN BLOOD BY AUTOMATED COUNT: 13 % (ref 12.3–15.4)
GLOBULIN SER CALC-MCNC: 2.6 GM/DL
GLUCOSE SERPL-MCNC: 86 MG/DL (ref 65–99)
HCT VFR BLD AUTO: 38.4 % (ref 34–46.6)
HDLC SERPL-MCNC: 71 MG/DL (ref 40–60)
HGB BLD-MCNC: 12.8 G/DL (ref 12–15.9)
IMM GRANULOCYTES # BLD AUTO: 0.05 10*3/MM3 (ref 0–0.05)
IMM GRANULOCYTES NFR BLD AUTO: 0.6 % (ref 0–0.5)
LDLC SERPL CALC-MCNC: 102 MG/DL (ref 0–100)
LDLC/HDLC SERPL: 1.43 {RATIO}
LYMPHOCYTES # BLD AUTO: 1.86 10*3/MM3 (ref 0.7–3.1)
LYMPHOCYTES NFR BLD AUTO: 23.3 % (ref 19.6–45.3)
MCH RBC QN AUTO: 30.5 PG (ref 26.6–33)
MCHC RBC AUTO-ENTMCNC: 33.3 G/DL (ref 31.5–35.7)
MCV RBC AUTO: 91.4 FL (ref 79–97)
MONOCYTES # BLD AUTO: 0.46 10*3/MM3 (ref 0.1–0.9)
MONOCYTES NFR BLD AUTO: 5.8 % (ref 5–12)
NEUTROPHILS # BLD AUTO: 5.32 10*3/MM3 (ref 1.7–7)
NEUTROPHILS NFR BLD AUTO: 66.5 % (ref 42.7–76)
NRBC BLD AUTO-RTO: 0 /100 WBC (ref 0–0.2)
PLATELET # BLD AUTO: 424 10*3/MM3 (ref 140–450)
POTASSIUM SERPL-SCNC: 4.5 MMOL/L (ref 3.5–5.2)
PROT SERPL-MCNC: 7.3 G/DL (ref 6–8.5)
RBC # BLD AUTO: 4.2 10*6/MM3 (ref 3.77–5.28)
SODIUM SERPL-SCNC: 143 MMOL/L (ref 136–145)
TRIGL SERPL-MCNC: 107 MG/DL (ref 0–150)
VLDLC SERPL CALC-MCNC: 21.4 MG/DL
WBC # BLD AUTO: 7.99 10*3/MM3 (ref 3.4–10.8)

## 2019-11-21 ENCOUNTER — TREATMENT (OUTPATIENT)
Dept: PHYSICAL THERAPY | Facility: CLINIC | Age: 84
End: 2019-11-21

## 2019-11-21 ENCOUNTER — TRANSCRIBE ORDERS (OUTPATIENT)
Dept: PHYSICAL THERAPY | Facility: CLINIC | Age: 84
End: 2019-11-21

## 2019-11-21 DIAGNOSIS — M25.562 CHRONIC PAIN OF BOTH KNEES: Primary | ICD-10-CM

## 2019-11-21 DIAGNOSIS — M17.0 OSTEOARTHRITIS OF BOTH KNEES, UNSPECIFIED OSTEOARTHRITIS TYPE: Primary | ICD-10-CM

## 2019-11-21 DIAGNOSIS — G89.29 CHRONIC PAIN OF BOTH KNEES: Primary | ICD-10-CM

## 2019-11-21 DIAGNOSIS — R26.9 GAIT DISTURBANCE: ICD-10-CM

## 2019-11-21 DIAGNOSIS — M25.561 CHRONIC PAIN OF BOTH KNEES: Primary | ICD-10-CM

## 2019-11-21 PROCEDURE — 97110 THERAPEUTIC EXERCISES: CPT | Performed by: PHYSICAL THERAPIST

## 2019-11-21 PROCEDURE — 97161 PT EVAL LOW COMPLEX 20 MIN: CPT | Performed by: PHYSICAL THERAPIST

## 2019-11-21 PROCEDURE — 97530 THERAPEUTIC ACTIVITIES: CPT | Performed by: PHYSICAL THERAPIST

## 2019-11-21 NOTE — PROGRESS NOTES
Physical Therapy Initial Evaluation and Plan of Care        Subjective Evaluation    History of Present Illness  Mechanism of injury: No specific NENA -pain just built up overtime, at least 2 yrs; recently worse; X-rays show stage  4 OA; steroid injections B knees at least 3; scheduled to have stem cell injections tomorrow; to get fitted brace for R knee tomorrow    Denies giving way but reports some near catching      Patient Occupation: NA; used to walk a lot   Precautions and Work Restrictions: NAPain  Current pain ratin  At worst pain ratin  Location: B medial knees  Quality: sharp  Relieving factors: rest and medications  Aggravating factors: stairs, squatting, prolonged positioning, standing, sleeping and ambulation  Progression: worsening    Social Support  Lives in: multiple-level home  Lives with: alone    Diagnostic Tests  X-ray: abnormal    Treatments  Previous treatment: injection treatment and medication  Current treatment: medication  Patient Goals  Patient goals for therapy: decreased pain and return to sport/leisure activities             Objective       Observations     Additional Observation Details  Mild-mod antalgia w/o AD, R>L    Tenderness   Left Knee   Tenderness in the inferior patella, medial joint line and medial patella.     Right Knee   Tenderness in the inferior patella, lateral patella, medial joint line and medial patella.     Active Range of Motion   Left Knee   Flexion: 107 degrees   Extensor la degrees     Right Knee   Flexion: 100 degrees   Extensor la degrees     Patellar Mobility     Additional Patellar Mobility Details  All hypomobile, dane med-lat    Strength/Myotome Testing     Left Hip   Planes of Motion   Flexion: 4-  Abduction: 5  Adduction: 5  External rotation: 4-  Internal rotation: 4    Right Hip   Planes of Motion   Flexion: 4-  Abduction: 5 (seated)  Adduction: 4+  External rotation: 4-  Internal rotation: 4    Left Knee   Flexion: 5  Extension:  4+    Right Knee   Flexion: 5  Extension: 4+    Tests     Left Knee   Negative anterior drawer, valgus stress test at 30 degrees and varus stress test at 30 degrees.     Right Knee   Negative anterior drawer, valgus stress test at 30 degrees and varus stress test at 30 degrees.     Additional Tests Details  B HS, gastroc, quad and ITB tightness         Assessment & Plan     Assessment  Impairments: abnormal gait, abnormal or restricted ROM, impaired physical strength and pain with function  Assessment details: 82 yo F with chronic pain B knees and Dx of stage 4 OA presents with moderate tightness, weakness BLE, gait distrubance and limited louisa for ADLs  Prognosis: fair  Functional Limitations: sleeping, walking, standing and stooping  Goals  Plan Goals: STGs 3 Weeks  1. Inc AROM to >7-110 ° for ease with ADLs  2. Amb level surface and >4  inch steps with  min to no limp    3.  Able to safely resume light household ADLs  4. Demos louisa for and compliance with HEP  5. Ed on joint protection principles    LTGs 6 weeks  1. Independent with HEP and joint protection principles  2. Ambulates level surface > 500 ft and > 6 inch steps with min to no antalgia  3. AROM > 3-115 for ease with ADLs (in/out of car, tub, etc)  4. Strength > 4+/5 and stability sufficient to allow safe return to normal ADLs    Plan  Therapy options: will be seen for skilled physical therapy services  Planned modality interventions: cryotherapy  Planned therapy interventions: manual therapy, therapeutic activities, stretching, strengthening, home exercise program, functional ROM exercises, gait training and body mechanics training  Frequency: 1-2 x wk.  Duration in weeks: 12  Treatment plan discussed with: patient        Manual Therapy:    0     mins  35724;  Therapeutic Exercise:    18     mins  36291;     Neuromuscular Karla:    0    mins  51779;    Therapeutic Activity:     8     mins  62807;         Timed Treatment:   26   mins   Total Treatment:      57   mins    PT SIGNATURE: Gladys Leal, ELLI   DATE TREATMENT INITIATED: 11/21/2019    Medicare Initial Certification  Certification Period: 2/19/2020  I certify that the therapy services are furnished while this patient is under my care.  The services outlined above are required by this patient, and will be reviewed every 90 days.     PHYSICIAN:  SVETLANA Clark MD     DATE:     Please sign and return via fax to [phone].. Thank you, [facility].

## 2019-11-21 NOTE — PATIENT INSTRUCTIONS
Educated about Dx and exam findings, rationale and POC. Gave handout on HEP with instructions.  Educated on basic joint protection principles

## 2019-12-03 ENCOUNTER — TREATMENT (OUTPATIENT)
Dept: PHYSICAL THERAPY | Facility: CLINIC | Age: 84
End: 2019-12-03

## 2019-12-03 DIAGNOSIS — G89.29 CHRONIC PAIN OF BOTH KNEES: Primary | ICD-10-CM

## 2019-12-03 DIAGNOSIS — R26.9 GAIT DISTURBANCE: ICD-10-CM

## 2019-12-03 DIAGNOSIS — M25.562 CHRONIC PAIN OF BOTH KNEES: Primary | ICD-10-CM

## 2019-12-03 DIAGNOSIS — M25.561 CHRONIC PAIN OF BOTH KNEES: Primary | ICD-10-CM

## 2019-12-03 PROCEDURE — 97110 THERAPEUTIC EXERCISES: CPT | Performed by: PHYSICAL THERAPIST

## 2019-12-03 NOTE — PROGRESS NOTES
Physical Therapy Daily Progress Note      Visit # 2      Subjective   Got the stem cell shot in R knee and had to cut down on arthritis medicine so I am feeling worse - sore all over body.  Getting stem cell on left knee Thursday.    Objective   See Exercise, Manual, and Modality Logs for complete treatment.       Assessment/Plan    Mod-severe antalgia w/o AD.  Flared-up from decrease in meds but able to progress ex.  Gradually progress ex as louisa; try shallow step             Manual Therapy:    0     mins  85853;  Therapeutic Exercise:    40     mins  74506;     Gait Trainin     mins  00415;       Timed Treatment:   42   mins   Total Treatment:     52   mins    Gladys Leal, PT  Physical Therapist

## 2019-12-09 ENCOUNTER — TREATMENT (OUTPATIENT)
Dept: PHYSICAL THERAPY | Facility: CLINIC | Age: 84
End: 2019-12-09

## 2019-12-09 DIAGNOSIS — M25.561 CHRONIC PAIN OF BOTH KNEES: Primary | ICD-10-CM

## 2019-12-09 DIAGNOSIS — M25.562 CHRONIC PAIN OF BOTH KNEES: Primary | ICD-10-CM

## 2019-12-09 DIAGNOSIS — G89.29 CHRONIC PAIN OF BOTH KNEES: Primary | ICD-10-CM

## 2019-12-09 DIAGNOSIS — R26.9 GAIT DISTURBANCE: ICD-10-CM

## 2019-12-09 PROCEDURE — 97110 THERAPEUTIC EXERCISES: CPT | Performed by: PHYSICAL THERAPIST

## 2019-12-09 NOTE — PROGRESS NOTES
Physical Therapy Daily Progress Note    Visit # : 3  Shanique Martinez reports: both of my knees are sore today but more on my L since gettingthe stem cell injection in that knee last Thursday.    I've been doing my HEP and using ice but my pain continues. Less popping in R knee. I got the stem cell injection in my L knee last Thursday.     Subjective     Objective   See Exercise, Manual, and Modality Logs for complete treatment.     Assessment/Plan  Did not advance to step activities secondary to persistent pain and difficulty with WB activities.  Regressed LAQ and modified HS activity due to increased symptoms.  Pt able to modified ex program today without increased symptoms. Pt was educated on reciprocal gait with SPC and how to fit (has walker and cane at home - used by  )  Progress strengthening /stabilization /functional activity  Consider SLR if louisa - pt instructed to bring cane to next visit for fitting/gait training.     Timed:  Manual Therapy:    -     mins  51665;  Therapeutic Exercise:    40     mins  48268;     Neuromuscular Karla:    -    mins  54788;    Therapeutic Activity:     3     mins  15991; (verbal instructions on ambulating with cane)      Timed Treatment:   43   mins direct  Total Treatment:     55   mins      Angie Rondon PT  Physical Therapist  [place] License # [id number]

## 2019-12-09 NOTE — PATIENT INSTRUCTIONS
Access Code: [email or web address]   URL: [email or web address]   Date: 12/09/2019   Prepared by: Tatiana Rondon     Program Notes   use your cane on your right side to unload your left knee. Your cane should be at the height of your wrist with your arms straight when standing upright     Patient Education   Reciprocal Gait with Cane

## 2019-12-11 ENCOUNTER — TREATMENT (OUTPATIENT)
Dept: PHYSICAL THERAPY | Facility: CLINIC | Age: 84
End: 2019-12-11

## 2019-12-11 DIAGNOSIS — M25.562 CHRONIC PAIN OF BOTH KNEES: Primary | ICD-10-CM

## 2019-12-11 DIAGNOSIS — M25.561 CHRONIC PAIN OF BOTH KNEES: Primary | ICD-10-CM

## 2019-12-11 DIAGNOSIS — G89.29 CHRONIC PAIN OF BOTH KNEES: Primary | ICD-10-CM

## 2019-12-11 DIAGNOSIS — R26.9 GAIT DISTURBANCE: ICD-10-CM

## 2019-12-11 PROCEDURE — 97110 THERAPEUTIC EXERCISES: CPT | Performed by: PHYSICAL THERAPIST

## 2019-12-11 NOTE — PROGRESS NOTES
Physical Therapy Daily Progress Note      Visit # 4      Subjective   Might be doing a little better - the cane helps.  Less popping and squeaking but pain still there.    Objective   See Exercise, Manual, and Modality Logs for complete treatment.       Assessment/Plan    Mild overall improvement with decreased sxs and tolerance for increased ex.  Still though with mod pain and limited louisa for WB ADLs    Progress ex as louisa working towards D/C to comprehensive HEP             Manual Therapy:    0     mins  41282;  Therapeutic Exercise:    43     mins  51073;     Neuromuscular Karla:    0    mins  46961;    Therapeutic Activity:     3     mins  38269;     Gait Training:      3     mins  74315;       Timed Treatment:   49   mins   Total Treatment:     59   mins    Gladys Leal, PT  Physical Therapist

## 2020-01-22 ENCOUNTER — TELEPHONE (OUTPATIENT)
Dept: INTERNAL MEDICINE | Facility: CLINIC | Age: 85
End: 2020-01-22

## 2020-01-22 NOTE — TELEPHONE ENCOUNTER
Pt called stating insurance wants to change her naproxen sodium to an alternative such as Naproxen DR. She states that the current rx really helps. She would like to know if there is much of a difference and what you r opinion is. Please call her at [phone]

## 2020-02-04 ENCOUNTER — TELEPHONE (OUTPATIENT)
Dept: INTERNAL MEDICINE | Facility: CLINIC | Age: 85
End: 2020-02-04

## 2020-02-04 NOTE — TELEPHONE ENCOUNTER
Patient called in stating that [place] will not cover her naproxen sodium (ANAPROX) 550 MG tablet this year. Pt was told that an exemption could be filled to have the rx covered. Please advise.      Pt. Call back [phone]

## 2020-02-28 ENCOUNTER — TELEPHONE (OUTPATIENT)
Dept: INTERNAL MEDICINE | Facility: CLINIC | Age: 85
End: 2020-02-28

## 2020-02-28 NOTE — TELEPHONE ENCOUNTER
PT called saying that the pharmacy told her that her naproxen sodium (ANAPROX) 550 MG tablet needs a PA to be covered. Can this be started? Pt says that she has about 4 days left on her current Rx.

## 2020-03-17 RX ORDER — CYCLOBENZAPRINE HCL 10 MG
10 TABLET ORAL 3 TIMES DAILY
Qty: 270 TABLET | Refills: 4 | Status: SHIPPED | OUTPATIENT
Start: 2020-03-17 | End: 2021-05-03

## 2020-03-17 NOTE — TELEPHONE ENCOUNTER
Pt called and requested refill for cyclobenzaprine (FLEXERIL) 10 MG tablet be sent to [address]    Pt call back  [phone]

## 2020-04-30 RX ORDER — NAPROXEN SODIUM 550 MG/1
550 TABLET ORAL 2 TIMES DAILY WITH MEALS
Qty: 60 TABLET | Refills: 6 | Status: SHIPPED | OUTPATIENT
Start: 2020-04-30 | End: 2020-11-25

## 2020-06-30 ENCOUNTER — OFFICE VISIT (OUTPATIENT)
Dept: INTERNAL MEDICINE | Facility: CLINIC | Age: 85
End: 2020-06-30

## 2020-06-30 VITALS
SYSTOLIC BLOOD PRESSURE: 138 MMHG | BODY MASS INDEX: 33.63 KG/M2 | HEART RATE: 78 BPM | WEIGHT: 197 LBS | HEIGHT: 64 IN | OXYGEN SATURATION: 97 % | DIASTOLIC BLOOD PRESSURE: 82 MMHG | RESPIRATION RATE: 16 BRPM | TEMPERATURE: 98 F

## 2020-06-30 DIAGNOSIS — M19.90 ARTHRITIS: ICD-10-CM

## 2020-06-30 DIAGNOSIS — E78.5 HYPERLIPIDEMIA LDL GOAL <130: Primary | ICD-10-CM

## 2020-06-30 PROCEDURE — 99213 OFFICE O/P EST LOW 20 MIN: CPT | Performed by: NURSE PRACTITIONER

## 2020-06-30 RX ORDER — ATORVASTATIN CALCIUM 10 MG/1
10 TABLET, FILM COATED ORAL DAILY
Qty: 90 TABLET | Refills: 1 | Status: SHIPPED | OUTPATIENT
Start: 2020-06-30 | End: 2021-02-02

## 2020-06-30 NOTE — PROGRESS NOTES
Name: Shanique Martinez  :  1935    Subjective:      Chief Complaint   Patient presents with   • Establish Care     Pt presents here today to establish care.   • Hyperlipidemia        Shanique Martinez is a 84 y.o. female prior patient of Germain Brady MD. Dr Brady has now retired and she is here to establish care with me.  She has multiple chronic medical conditions including: hyperlipidemia, OA (knee)     She is new to me.   She was last seen by Dr Brady on 2019.       She has chronic Left knee pain 2/2 OA.   Since her last visit here,  she had PT in December.   She has had stem cell injection in L knee around the same time. She states that that was not effective.   She had hyaluronic acid injection last week and states it has helped some.   She continues naproxen daily.  She takes with food.   Worse when she walks up and down stairs.  Washer and dryer are in her basement.  No falls.     Hyperlipidemia   This is a chronic problem. Episode onset: . The problem is controlled. Recent lipid tests were reviewed and are normal. There are no known factors aggravating her hyperlipidemia. Pertinent negatives include no chest pain or shortness of breath. Current antihyperlipidemic treatment includes statins. The current treatment provides moderate improvement of lipids. There are no compliance problems.        She states she is coping well after husbands death as he had been ill for several years.       Health Maintenance Due   Topic   • TDAP/TD VACCINES (1 - Tdap)   • ZOSTER VACCINE (2 of 3)   • MEDICARE ANNUAL WELLNESS           I have reviewed the patient's medical history in detail and updated the computerized patient record.    Past Medical History:   Diagnosis Date   • Arthritis    • Cancer (CMS/HCC)     Melanoma: L FA    • History of bone density study    • History of mammogram 2013   • History of mammogram 2011   • Hyperlipidemia    • Hypertension     HBP with  hypertension unspecified       Past Surgical History:   Procedure Laterality Date   • COLONOSCOPY  2006   • HYSTERECTOMY     • SKIN SURGERY  2000    L SEVEN - melanoma, Dr Caban        Family History   Problem Relation Age of Onset   • Heart disease Mother    • Heart disease Father         Lived until he was 102 yoa    • Cancer Sister    • Stroke Sister    • No Known Problems Brother    • No Known Problems Daughter    • No Known Problems Son    • No Known Problems Maternal Grandmother    • No Known Problems Paternal Grandmother    • No Known Problems Maternal Aunt    • No Known Problems Paternal Aunt    • BRCA 1/2 Neg Hx    • Breast cancer Neg Hx    • Colon cancer Neg Hx    • Endometrial cancer Neg Hx    • Ovarian cancer Neg Hx        Social History     Socioeconomic History   • Marital status:      Spouse name: Not on file   • Number of children: Not on file   • Years of education: Not on file   • Highest education level: Not on file   Tobacco Use   • Smoking status: Never Smoker   • Smokeless tobacco: Never Used   Substance and Sexual Activity   • Alcohol use: No   • Drug use: No       Most Recent Immunizations   Administered Date(s) Administered   • Flu Vaccine Quad PF >18YRS 09/08/2014   • Fluzone High Dose =>65 Years ([facility] ONLY) 10/03/2018   • Influenza, Unspecified 10/01/2013   • Pneumococcal Polysaccharide (PPSV23) 10/02/2012   • Zostavax 02/06/2013         Review of Systems:   Review of Systems   Constitutional: Negative for unexpected weight change.   HENT: Negative.    Respiratory: Negative for shortness of breath.    Cardiovascular: Negative for chest pain and palpitations.   Gastrointestinal: Negative.    Endocrine: Negative.    Genitourinary: Negative.    Musculoskeletal: Positive for arthralgias.   Neurological: Negative.    Hematological: Negative.    Psychiatric/Behavioral: Negative.          Objective:      Physical Exam:   Physical Exam   Constitutional: She is oriented to person, place, and  "time. She appears well-developed. She is cooperative.   HENT:   Head: Normocephalic.   Eyes: Pupils are equal, round, and reactive to light. Conjunctivae are normal.   Neck: Normal range of motion. No thyromegaly present.   Cardiovascular: Normal rate, regular rhythm, normal heart sounds, intact distal pulses and normal pulses.   No murmur heard.  Pulmonary/Chest: Effort normal and breath sounds normal. She exhibits no deformity.   Equal, Unlabored   Abdominal: Soft. Bowel sounds are normal.   Musculoskeletal: Normal range of motion. She exhibits no edema.   Lymphadenopathy:     She has no cervical adenopathy.   Neurological: She is alert and oriented to person, place, and time.   Skin: Skin is warm and dry. Capillary refill takes 2 to 3 seconds.   Psychiatric: She has a normal mood and affect. Her behavior is normal. Judgment and thought content normal.   Vitals reviewed.        Vital Signs:  Vitals:    06/30/20 1540 06/30/20 1602   BP: 180/80 138/82   BP Location: Left arm    Patient Position: Sitting    Cuff Size: Adult    Pulse: 78    Resp: 16    Temp: 98 °F (36.7 °C)    TempSrc: Oral    SpO2: 97%    Weight: 89.4 kg (197 lb)    Height: 162.6 cm (64\")      Body mass index is 33.81 kg/m².      Results Review:      REVIEWED AND DISCUSSED LAB RESULTS WITH PATIENT      Requested Prescriptions     Signed Prescriptions Disp Refills   • atorvastatin (LIPITOR) 10 MG tablet 90 tablet 1     Sig: Take 1 tablet by mouth Daily.     Routine medications provided by this office will also be refilled via pharmacy request.       Current Outpatient Medications:   •  atorvastatin (LIPITOR) 10 MG tablet, Take 1 tablet by mouth Daily., Disp: 90 tablet, Rfl: 1  •  CALCIUM PO, Take 1 tablet by mouth Daily., Disp: , Rfl:   •  cyclobenzaprine (FLEXERIL) 10 MG tablet, Take 1 tablet by mouth 3 (Three) Times a Day., Disp: 270 tablet, Rfl: 4  •  naproxen sodium (ANAPROX) 550 MG tablet, Take 1 tablet by mouth 2 (Two) Times a Day With Meals., " "Disp: 60 tablet, Rfl: 6       Assessment and Plan:        Problem List Items Addressed This Visit        Cardiovascular and Mediastinum    Hyperlipidemia LDL goal <130 - Primary     Lipid abnormalities are improving with treatment.  Pharmacotherapy as ordered.  Lipids will be reassessed in 6 months.         Relevant Medications    atorvastatin (LIPITOR) 10 MG tablet    Other Relevant Orders    Comprehensive Metabolic Panel    Lipid Panel With LDL / HDL Ratio    CBC (No Diff)       Musculoskeletal and Integument    Arthritis     BL knees   Considering knee replacement in the future  Following with ortho          Relevant Orders    Comprehensive Metabolic Panel    CBC (No Diff)             Discussed any change in Rx and discussed visit with patient.  All questions answered.      Return in about 6 months (around 12/30/2020) for Medicare Wellness.    Eddie \"Keith\" Ariadne, APRN   06/30/20    Dragon disclaimer:   Much of this encounter note is an electronic transcription/translation of spoken language to printed text. The electronic translation of spoken language may permit erroneous, or at times, nonsensical words or phrases to be inadvertently transcribed; Although I have reviewed the note for such errors, some may still exist.     Additional Patient Counseling:       Patient Instructions   Diet:    • Eat vegetables, fruits, whole grain, low-fat dairy, poultry, fish, beans, nontropical vegetable oils, and nuts, but avoid red meat (i.e., Mediterranean-style diet, DASH [Dietary Approaches to Stop Hypertension] diet).  • Limit sugary drinks and sweets.  • Limit saturated and trans fat to 5% to 6% of calories.  • Limit sodium intake to 2,400 mg daily (about one teaspoon table salt [kosher/sea salt have less sodium per teaspoon]).  Weight loss / Calorie Counting Apps:    • Lose It!   • MyFitness Pal   • Works great when you try it with a partner/ friend  Exercise:   • Engage in moderate-to-vigorous aerobic activity for at " least 40 minutes (on average) three to four times each week.  Wearables:   • Activity tracker   • Step tracker   Skin Care:   • Use sun screen SPF >30 daily  • Dermatologist for skin check regularly  Bone Health:   • [email or web address]        Vaccines:     Shingles vaccine is given in TWO separate injections.   CDC recommends that healthy adults 50 years and older get two doses of the shingles vaccine called Shingrix (recombinant zoster vaccine),  by 2 to 6 months, to prevent shingles and the complications from the disease.

## 2020-06-30 NOTE — PATIENT INSTRUCTIONS
Diet:    • Eat vegetables, fruits, whole grain, low-fat dairy, poultry, fish, beans, nontropical vegetable oils, and nuts, but avoid red meat (i.e., Mediterranean-style diet, DASH [Dietary Approaches to Stop Hypertension] diet).  • Limit sugary drinks and sweets.  • Limit saturated and trans fat to 5% to 6% of calories.  • Limit sodium intake to 2,400 mg daily (about one teaspoon table salt [kosher/sea salt have less sodium per teaspoon]).  Weight loss / Calorie Counting Apps:    • Lose It!   • [employer] Pal   • Works great when you try it with a partner/ friend  Exercise:   • Engage in moderate-to-vigorous aerobic activity for at least 40 minutes (on average) three to four times each week.  Wearables:   • Activity tracker   • Step tracker   Skin Care:   • Use sun screen SPF >30 daily  • Dermatologist for skin check regularly  Bone Health:   • [email or web address]        Vaccines:     Shingles vaccine is given in TWO separate injections.   CDC recommends that healthy adults 50 years and older get two doses of the shingles vaccine called Shingrix (recombinant zoster vaccine),  by 2 to 6 months, to prevent shingles and the complications from the disease.

## 2020-07-01 LAB
ALBUMIN SERPL-MCNC: 4.5 G/DL (ref 3.6–4.6)
ALBUMIN/GLOB SERPL: 1.6 {RATIO} (ref 1.2–2.2)
ALP SERPL-CCNC: 117 IU/L (ref 39–117)
ALT SERPL-CCNC: 15 IU/L (ref 0–32)
AST SERPL-CCNC: 16 IU/L (ref 0–40)
BILIRUB SERPL-MCNC: 0.3 MG/DL (ref 0–1.2)
BUN SERPL-MCNC: 20 MG/DL (ref 8–27)
BUN/CREAT SERPL: 22 (ref 12–28)
CALCIUM SERPL-MCNC: 10.5 MG/DL (ref 8.7–10.3)
CHLORIDE SERPL-SCNC: 101 MMOL/L (ref 96–106)
CHOLEST SERPL-MCNC: 228 MG/DL (ref 100–199)
CO2 SERPL-SCNC: 25 MMOL/L (ref 20–29)
CREAT SERPL-MCNC: 0.91 MG/DL (ref 0.57–1)
ERYTHROCYTE [DISTWIDTH] IN BLOOD BY AUTOMATED COUNT: 12.4 % (ref 11.7–15.4)
GLOBULIN SER CALC-MCNC: 2.8 G/DL (ref 1.5–4.5)
GLUCOSE SERPL-MCNC: ABNORMAL MG/DL
HCT VFR BLD AUTO: 39.7 % (ref 34–46.6)
HDLC SERPL-MCNC: 72 MG/DL
HGB BLD-MCNC: 13 G/DL (ref 11.1–15.9)
LDLC SERPL CALC-MCNC: 132 MG/DL (ref 0–99)
LDLC/HDLC SERPL: 1.8 RATIO (ref 0–3.2)
MCH RBC QN AUTO: 30 PG (ref 26.6–33)
MCHC RBC AUTO-ENTMCNC: 32.7 G/DL (ref 31.5–35.7)
MCV RBC AUTO: 92 FL (ref 79–97)
PLATELET # BLD AUTO: 427 X10E3/UL (ref 150–450)
POTASSIUM SERPL-SCNC: ABNORMAL MMOL/L
PROT SERPL-MCNC: 7.3 G/DL (ref 6–8.5)
RBC # BLD AUTO: 4.33 X10E6/UL (ref 3.77–5.28)
SODIUM SERPL-SCNC: 140 MMOL/L (ref 134–144)
TRIGL SERPL-MCNC: 121 MG/DL (ref 0–149)
VLDLC SERPL CALC-MCNC: 24 MG/DL (ref 5–40)
WBC # BLD AUTO: 9.6 X10E3/UL (ref 3.4–10.8)

## 2020-09-21 ENCOUNTER — TRANSCRIBE ORDERS (OUTPATIENT)
Dept: INTERNAL MEDICINE | Facility: CLINIC | Age: 85
End: 2020-09-21

## 2020-09-21 DIAGNOSIS — Z12.31 SCREENING MAMMOGRAM, ENCOUNTER FOR: Primary | ICD-10-CM

## 2020-10-23 ENCOUNTER — HOSPITAL ENCOUNTER (OUTPATIENT)
Dept: MAMMOGRAPHY | Facility: HOSPITAL | Age: 85
Discharge: HOME | End: 2020-10-23
Admitting: NURSE PRACTITIONER

## 2020-10-23 DIAGNOSIS — Z12.31 SCREENING MAMMOGRAM, ENCOUNTER FOR: ICD-10-CM

## 2020-10-23 PROCEDURE — 77063 BREAST TOMOSYNTHESIS BI: CPT

## 2020-10-23 PROCEDURE — 77067 SCR MAMMO BI INCL CAD: CPT

## 2020-11-25 RX ORDER — NAPROXEN SODIUM 550 MG/1
TABLET ORAL
Qty: 60 TABLET | Refills: 5 | Status: SHIPPED | OUTPATIENT
Start: 2020-11-25 | End: 2021-06-07

## 2020-12-10 ENCOUNTER — OFFICE VISIT (OUTPATIENT)
Dept: INTERNAL MEDICINE | Facility: CLINIC | Age: 85
End: 2020-12-10

## 2020-12-10 VITALS
WEIGHT: 207.8 LBS | SYSTOLIC BLOOD PRESSURE: 138 MMHG | HEART RATE: 93 BPM | BODY MASS INDEX: 35.48 KG/M2 | RESPIRATION RATE: 16 BRPM | DIASTOLIC BLOOD PRESSURE: 68 MMHG | OXYGEN SATURATION: 98 % | HEIGHT: 64 IN | TEMPERATURE: 98 F

## 2020-12-10 DIAGNOSIS — E78.5 HYPERLIPIDEMIA LDL GOAL <130: ICD-10-CM

## 2020-12-10 DIAGNOSIS — M19.90 ARTHRITIS: ICD-10-CM

## 2020-12-10 DIAGNOSIS — E83.52 HYPERCALCEMIA: ICD-10-CM

## 2020-12-10 DIAGNOSIS — Z00.00 MEDICARE ANNUAL WELLNESS VISIT, SUBSEQUENT: Primary | ICD-10-CM

## 2020-12-10 DIAGNOSIS — M47.818 SI JOINT ARTHRITIS: ICD-10-CM

## 2020-12-10 PROBLEM — D49.2 ABNORMAL SKIN GROWTH: Status: RESOLVED | Noted: 2017-07-17 | Resolved: 2020-12-10

## 2020-12-10 PROCEDURE — G0439 PPPS, SUBSEQ VISIT: HCPCS | Performed by: NURSE PRACTITIONER

## 2020-12-10 NOTE — PROGRESS NOTES
The ABCs of the Annual Wellness Visit  Subsequent Medicare Wellness Visit    Chief Complaint   Patient presents with   • Medicare Wellness-subsequent   • Hyperlipidemia       Subjective   History of Present Illness:  Shanique Martinez is a 84 y.o. female who presents for a Subsequent Medicare Wellness Visit.     She has multiple chronic medical conditions including: hyperlipidemia, OA (knee)      Saw ortho - 7/22/20 and had hyaluronic acid and will go back after Shant[facility]     Dr Montoya - gives injections in SI joint q 3 months - effective     HEALTH RISK ASSESSMENT    Recent Hospitalizations:  No hospitalization(s) within the last year.    Current Medical Providers:  Patient Care Team:  Alvarez Ron III, NP-C as PCP - General (Family Medicine)  Sigrid Clark MD as Consulting Physician (Orthopedic Surgery)  Kanika Montoya PA (Physician Assistant)    Smoking Status:  Social History     Tobacco Use   Smoking Status Never Smoker   Smokeless Tobacco Never Used       Alcohol Consumption:  Social History     Substance and Sexual Activity   Alcohol Use No       Depression Screen:   PHQ-2/PHQ-9 Depression Screening 12/10/2020   Little interest or pleasure in doing things 1   Feeling down, depressed, or hopeless 0   Total Score 1       Fall Risk Screen:  STEADI Fall Risk Assessment was completed, and patient is at LOW risk for falls.Assessment completed on:12/10/2020    Health Habits and Functional and Cognitive Screening:  Functional & Cognitive Status 12/10/2020   Do you have difficulty preparing food and eating? No   Do you have difficulty bathing yourself, getting dressed or grooming yourself? No   Do you have difficulty using the toilet? No   Do you have difficulty moving around from place to place? No   Do you have trouble with steps or getting out of a bed or a chair? No   Current Diet Unhealthy Diet   Dental Exam Up to date   Eye Exam Up to date   Exercise (times per week) 2 times per week   Current  Exercise Activities Include Walking   Do you need help using the phone?  No   Are you deaf or do you have serious difficulty hearing?  No   Do you need help with transportation? No   Do you need help shopping? No   Do you need help preparing meals?  No   Do you need help with housework?  No   Do you need help with laundry? No   Do you need help taking your medications? No   Do you need help managing money? No   Do you ever drive or ride in a car without wearing a seat belt? No   Have you felt unusual stress, anger or loneliness in the last month? No   Who do you live with? Alone   If you need help, do you have trouble finding someone available to you? No   Have you been bothered in the last four weeks by sexual problems? No   Do you have difficulty concentrating, remembering or making decisions? No         Does the patient have evidence of cognitive impairment? No    Asprin use counseling:Does not need ASA (and currently is not on it)    Age-appropriate Screening Schedule:  Refer to the list below for future screening recommendations based on patient's age, sex and/or medical conditions. Orders for these recommended tests are listed in the plan section. The patient has been provided with a written plan.    Health Maintenance   Topic Date Due   • TDAP/TD VACCINES (1 - Tdap) 12/19/1954   • ZOSTER VACCINE (2 of 3) 04/03/2013   • LIPID PANEL  06/30/2021   • INFLUENZA VACCINE  Completed          The following portions of the patient's history were reviewed and updated as appropriate: allergies, current medications, past family history, past medical history, past social history, past surgical history and problem list.    Outpatient Medications Prior to Visit   Medication Sig Dispense Refill   • atorvastatin (LIPITOR) 10 MG tablet Take 1 tablet by mouth Daily. 90 tablet 1   • CALCIUM PO Take 1 tablet by mouth Daily.     • cyclobenzaprine (FLEXERIL) 10 MG tablet Take 1 tablet by mouth 3 (Three) Times a Day. 270 tablet 4   •  "naproxen sodium (ANAPROX) 550 MG tablet TAKE ONE TABLET BY MOUTH TWICE A DAY WITH A MEAL 60 tablet 5     No facility-administered medications prior to visit.        Patient Active Problem List   Diagnosis   • Hyperlipidemia LDL goal <130   • SI joint arthritis   • Arthritis   • Encounter for long-term (current) drug use   • H/O hysterectomy for benign disease   • Menopause       Advanced Care Planning:  ACP discussion was held with the patient during this visit. Patient has an advance directive (not in EMR), copy requested.    Review of Systems   Constitutional: Negative for chills, fever and unexpected weight change.   Respiratory: Negative for shortness of breath.    Cardiovascular: Negative for chest pain and leg swelling.   Genitourinary: Negative.    Musculoskeletal: Positive for arthralgias.       Compared to one year ago, the patient feels her physical health is the same.  Compared to one year ago, the patient feels her mental health is the same.    Reviewed chart for potential of high risk medication in the elderly: yes  Reviewed chart for potential of harmful drug interactions in the elderly:yes    Objective         Vitals:    12/10/20 1011 12/10/20 1055   BP: 152/77 138/68  Comment: left - manual   BP Location: Left arm    Patient Position: Sitting    Cuff Size: Adult    Pulse: 93    Resp: 16    Temp: 98 °F (36.7 °C)    TempSrc: Oral    SpO2: 98%    Weight: 94.3 kg (207 lb 12.8 oz)    Height: 162.6 cm (64\")        Body mass index is 35.67 kg/m².  Discussed the patient's BMI with her. The BMI is above average; BMI management plan is completed.    Physical Exam  Vitals signs reviewed.   Constitutional:       Appearance: She is well-developed.      Comments: Ambulates with cane    HENT:      Head: Normocephalic.      Comments: Wearing mask due to COVID   Eyes:      Conjunctiva/sclera: Conjunctivae normal.      Pupils: Pupils are equal, round, and reactive to light.   Neck:      Musculoskeletal: Normal range " of motion and neck supple.      Thyroid: No thyromegaly.   Cardiovascular:      Rate and Rhythm: Normal rate and regular rhythm.      Pulses: Normal pulses.      Heart sounds: Normal heart sounds.   Pulmonary:      Effort: Pulmonary effort is normal.      Breath sounds: Normal breath sounds.      Comments: E/U   Abdominal:      General: Bowel sounds are normal.      Palpations: Abdomen is soft.   Musculoskeletal: Normal range of motion.   Lymphadenopathy:      Cervical: No cervical adenopathy.   Skin:     General: Skin is warm and dry.      Capillary Refill: Capillary refill takes 2 to 3 seconds.   Neurological:      Mental Status: She is alert and oriented to person, place, and time.   Psychiatric:         Mood and Affect: Mood normal.         Behavior: Behavior normal. Behavior is cooperative.         Thought Content: Thought content normal.         Judgment: Judgment normal.               Assessment/Plan   Medicare Risks and Personalized Health Plan  CMS Preventative Services Quick Reference  Advance Directive Discussion  Fall Risk  Immunizations Discussed/Encouraged (specific immunizations; Td and Shingrix )  Obesity/Overweight     The above risks/problems have been discussed with the patient.  Pertinent information has been shared with the patient in the After Visit Summary.  Follow up plans and orders are seen below in the Assessment/Plan Section.    Diagnoses and all orders for this visit:    1. Medicare annual wellness visit, subsequent (Primary)  -     Comprehensive Metabolic Panel; Future  -     Lipid Panel With LDL / HDL Ratio; Future  -     CBC (No Diff); Future    2. Hyperlipidemia LDL goal <130  Assessment & Plan:  Lipid abnormalities are improving with treatment.  Pharmacotherapy as ordered.  Lipids will be reassessed in 6 months.    Lab Results   Component Value Date    CHOL 175 05/03/2017    CHLPL 228 (H) 06/30/2020    TRIG 121 06/30/2020    HDL 72 06/30/2020     (H) 06/30/2020        Orders:  -     Comprehensive Metabolic Panel; Future  -     Lipid Panel With LDL / HDL Ratio; Future  -     CBC (No Diff); Future  -     Calcium, Ionized; Future    3. Arthritis  Assessment & Plan:  Stable on naproxen   Aware to always take with food     Flexeril - as needed for muscle spasms (L shoulder - Discussed rom exercises - if not improved, refer to PT)       4. SI joint arthritis  Assessment & Plan:  Stable   Continues to see Dr Montoya       5. Hypercalcemia  -     Calcium, Ionized; Future    Follow Up:  Return in about 6 months (around 6/10/2021).     An After Visit Summary and PPPS were given to the patient.

## 2020-12-10 NOTE — ASSESSMENT & PLAN NOTE
Stable on naproxen   Aware to always take with food     Flexeril - as needed for muscle spasms (L shoulder - Discussed rom exercises - if not improved, refer to PT)

## 2020-12-10 NOTE — ASSESSMENT & PLAN NOTE
Lipid abnormalities are improving with treatment.  Pharmacotherapy as ordered.  Lipids will be reassessed in 6 months.    Lab Results   Component Value Date    CHOL 175 05/03/2017    CHLPL 228 (H) 06/30/2020    TRIG 121 06/30/2020    HDL 72 06/30/2020     (H) 06/30/2020

## 2020-12-10 NOTE — PATIENT INSTRUCTIONS
Vaccines:     Shingles vaccine is given in TWO separate injections.   CDC recommends that healthy adults 50 years and older get two doses of the shingles vaccine called Shingrix (recombinant zoster vaccine),  by 2 to 6 months, to prevent shingles and the complications from the disease.         Medicare Wellness  Personal Prevention Plan of Service     Date of Office Visit:  12/10/2020  Encounter Provider:  Alvarez Ron III, NP-C  Place of Service:  [facility]  Patient Name: Shanique Martinez  :  1935    As part of the Medicare Wellness portion of your visit today, we are providing you with this personalized preventive plan of services (PPPS). This plan is based upon recommendations of the [place] Preventive Services Task Force (USPSTF) and the Advisory Committee on Immunization Practices (ACIP).    This lists the preventive care services that should be considered, and provides dates of when you are due. Items listed as completed are up-to-date and do not require any further intervention.    Health Maintenance   Topic Date Due   • TDAP/TD VACCINES (1 - Tdap) 1955   • ZOSTER VACCINE (2 of 3) 2013   • LIPID PANEL  2021   • ANNUAL WELLNESS VISIT  12/10/2021   • INFLUENZA VACCINE  Completed   • Pneumococcal Vaccine 65+  Completed       No orders of the defined types were placed in this encounter.      Return in about 6 months (around 6/10/2021).

## 2021-02-02 DIAGNOSIS — E78.5 HYPERLIPIDEMIA LDL GOAL <130: ICD-10-CM

## 2021-02-02 RX ORDER — ATORVASTATIN CALCIUM 10 MG/1
TABLET, FILM COATED ORAL
Qty: 90 TABLET | Refills: 1 | Status: SHIPPED | OUTPATIENT
Start: 2021-02-02 | End: 2021-06-02

## 2021-03-02 DIAGNOSIS — Z23 IMMUNIZATION DUE: ICD-10-CM

## 2021-05-03 RX ORDER — CYCLOBENZAPRINE HCL 10 MG
TABLET ORAL
Qty: 270 TABLET | Refills: 3 | Status: SHIPPED | OUTPATIENT
Start: 2021-05-03 | End: 2022-05-11

## 2021-06-02 DIAGNOSIS — E78.5 HYPERLIPIDEMIA LDL GOAL <130: ICD-10-CM

## 2021-06-02 RX ORDER — ATORVASTATIN CALCIUM 10 MG/1
TABLET, FILM COATED ORAL
Qty: 90 TABLET | Refills: 0 | Status: SHIPPED | OUTPATIENT
Start: 2021-06-02 | End: 2021-12-07

## 2021-06-07 RX ORDER — NAPROXEN SODIUM 550 MG/1
TABLET ORAL
Qty: 60 TABLET | Refills: 4 | Status: SHIPPED | OUTPATIENT
Start: 2021-06-07 | End: 2021-11-09

## 2021-07-02 ENCOUNTER — OFFICE VISIT (OUTPATIENT)
Dept: INTERNAL MEDICINE | Facility: CLINIC | Age: 86
End: 2021-07-02

## 2021-07-02 VITALS
WEIGHT: 202 LBS | SYSTOLIC BLOOD PRESSURE: 140 MMHG | HEART RATE: 83 BPM | DIASTOLIC BLOOD PRESSURE: 82 MMHG | HEIGHT: 64 IN | TEMPERATURE: 98.2 F | OXYGEN SATURATION: 94 % | BODY MASS INDEX: 34.49 KG/M2

## 2021-07-02 DIAGNOSIS — Z78.0 POSTMENOPAUSAL: ICD-10-CM

## 2021-07-02 DIAGNOSIS — M19.90 ARTHRITIS: Chronic | ICD-10-CM

## 2021-07-02 DIAGNOSIS — E78.5 HYPERLIPIDEMIA LDL GOAL <130: Primary | Chronic | ICD-10-CM

## 2021-07-02 PROCEDURE — 99214 OFFICE O/P EST MOD 30 MIN: CPT | Performed by: NURSE PRACTITIONER

## 2021-07-02 NOTE — PROGRESS NOTES
Chief Complaint  Hyperlipidemia     Subjective:      History of Present Illness {CC  Problem List  Visit  Diagnosis   Encounters  Notes  Medications  Labs  Result Review Imaging  Media :23}     Shanique Martinez presents to [facility] for chronic medical conditions including: hyperlipidemia, OA (knee)       LV: 12/10/2020 - note reviewed.     Since last visit she is seeing Ortho for bilateral knee arthritis.  She has had injection in both knees.  She continues naproxen.  Advised to always take with food.     She has had both COVID vaccines.     She continues lipitor without complaint of myalgia.     Due for dexa.        Objective:      Physical Exam  Vitals reviewed.   Constitutional:       Appearance: Normal appearance. She is well-developed.   HENT:      Head:      Comments: Wearing mask due to COVID   Neck:      Thyroid: No thyromegaly.   Cardiovascular:      Rate and Rhythm: Normal rate and regular rhythm.      Pulses: Normal pulses.      Heart sounds: Normal heart sounds.   Pulmonary:      Effort: Pulmonary effort is normal.      Breath sounds: Normal breath sounds.      Comments: E/U   Musculoskeletal:      Right knee: Crepitus present.      Left knee: Crepitus present.   Skin:     General: Skin is warm and dry.      Capillary Refill: Capillary refill takes 2 to 3 seconds.   Neurological:      Mental Status: She is alert and oriented to person, place, and time.   Psychiatric:         Mood and Affect: Mood normal.         Behavior: Behavior normal. Behavior is cooperative.         Thought Content: Thought content normal.         Judgment: Judgment normal.        Result Review  Data Reviewed:{ Labs  Result Review  Imaging  Med Tab  Media :23}   The following data was reviewed by: Alvarez Ron III, NP-C on 07/02/2021  Lab Results - Last 18 Months   Lab Units 06/08/21  0918 06/30/20  1624   GLUCOSE mg/dL 92 CANCELED   BUN mg/dL 15 20   CREATININE  "mg/dL 0.85 0.91   EGFR IF NONAFRICN AM mL/min/1.73 64 59*   EGFR IF AFRICN AM mL/min/1.73 77 67   SODIUM mmol/L 141 140   POTASSIUM mmol/L 4.8 CANCELED   CHLORIDE mmol/L 104 101   CALCIUM mg/dL 10.1 10.5*   ALBUMIN g/dL 4.60 4.5   BILIRUBIN mg/dL 0.4 0.3   ALK PHOS U/L 98 117   AST (SGOT) U/L 26 16   ALT (SGPT) U/L 18 15   CHOLESTEROL mg/dL 166 228*   TRIGLYCERIDES mg/dL 77 121   HDL CHOL mg/dL 74* 72   VLDL CHOL mg/dL  --  24   VLDL CHOLESTEROL KARI mg/dL 14  --    LDL CHOL mg/dL 78 132*   LDL/HDL RATIO  1.04 1.8   WBC 10*3/mm3 5.02 9.6   RBC 10*6/mm3 4.20 4.33   HEMATOCRIT % 38.7 39.7   MCV fL 92.1 92   MCH pg 30.5 30.0          Vital Signs:   /82 (BP Location: Left arm, Patient Position: Sitting, Cuff Size: Adult)   Pulse 83   Temp 98.2 °F (36.8 °C) (Temporal)   Ht 162.6 cm (64\")   Wt 91.6 kg (202 lb)   SpO2 94%   BMI 34.67 kg/m²         Requested Prescriptions      No prescriptions requested or ordered in this encounter     Routine medications provided by this office will also be refilled via pharmacy request.       Current Outpatient Medications:   •  atorvastatin (LIPITOR) 10 MG tablet, TAKE ONE TABLET BY MOUTH DAILY, Disp: 90 tablet, Rfl: 0  •  CALCIUM PO, Take 1 tablet by mouth Daily., Disp: , Rfl:   •  cyclobenzaprine (FLEXERIL) 10 MG tablet, TAKE ONE TABLET BY MOUTH THREE TIMES A DAY, Disp: 270 tablet, Rfl: 3  •  naproxen sodium (ANAPROX) 550 MG tablet, TAKE ONE TABLET BY MOUTH TWICE A DAY WITH MEALS, Disp: 60 tablet, Rfl: 4     Assessment and Plan:      Assessment and Plan {CC Problem List  Visit Diagnosis  ROS  Review (Popup)  Health Maintenance  Quality  BestPractice  Medications  SmartSets  SnapShot Encounters  Media :23}     Problem List Items Addressed This Visit        Cardiac and Vasculature    Hyperlipidemia LDL goal <130 - Primary (Chronic)    Overview     Current medication: lipitor 10 mg daily          Current Assessment & Plan     Lipid abnormalities are improving with " treatment.  Pharmacotherapy as ordered.  Lipids will be reassessed in 6 months.    Lab Results   Component Value Date    CHOL 175 05/03/2017    CHLPL 166 06/08/2021    TRIG 77 06/08/2021    HDL 74 (H) 06/08/2021    LDL 78 06/08/2021            Relevant Orders    Comprehensive Metabolic Panel    Lipid Panel With LDL / HDL Ratio       Musculoskeletal and Injuries    Arthritis (Chronic)    Current Assessment & Plan     Stable - continue to follow up with ortho.   NSAIDs - naproxen - always take with food.          Relevant Orders    Comprehensive Metabolic Panel      Other Visit Diagnoses     Postmenopausal        Relevant Orders    DEXA Bone Density Axial          Follow Up {Instructions Charge Capture  Follow-up Communications :23}     Return in about 6 months (around 1/2/2022) for Medicare Wellness.    Patient was given instructions and counseling regarding her condition or for health maintenance advice. Please see specific information pulled into the AVS if appropriate.    Dragon disclaimer:   Much of this encounter note is an electronic transcription/translation of spoken language to printed text. The electronic translation of spoken language may permit erroneous, or at times, nonsensical words or phrases to be inadvertently transcribed; Although I have reviewed the note for such errors, some may still exist.     Additional Patient Counseling:       Patient Instructions     It's Summer Time!    Stay hydrated when outside  If your urine starts to get darker, you are not drinking enough     Protect yourself from ticks and mosquitos  Here are the best ingredients to look for:  · DEET (N,N-diethyl-m-toluamide or N,N-diethyl-3-methyl-benzamide)  · Picaridin  · Oil of lemon eucalyptus (p-menthane-3,8-diol or PMD)  Wear light-colored pants so you can spot ticks easier  If you use a permethrin product, ONLY apply to clothing    Practice Safe Sun!    · Use sun screen SPF >30 daily, reapply regularly per directions on  package  · See dermatologist for skin check regularly  · Protect your eyes with sunglasses with UV protection    Poison Ivy  If you are going into areas that may have poison ivy, prepare with a product like IvyX or other ivy blocker.  Wash your clothes and pets after being in an area with ivy when returning home. If you come in contact with poison ivy, try a product like Technu     Other things you should incorporate all year...     Diet:    • Eat vegetables, fruits, whole grain, low-fat dairy, poultry, fish, beans, nontropical vegetable oils, and nuts, but avoid red meat (i.e., Mediterranean-style diet, DASH [Dietary Approaches to Stop Hypertension] diet).  • Limit sugary drinks and sweets.  • Limit saturated and trans fat to 5% to 6% of calories.  • Limit sodium intake to 2,400 mg daily (about one teaspoon table salt [kosher/sea salt have less sodium per teaspoon]).  Weight loss / Calorie Counting Apps:    • Lose It!   • MyFitFoodspotting Pal   • Works great when you try it with a partner/ friend. It takes about 15 minutes a day but studies show that this simple method of monitoring your intake can help you achieve goals as it keeps you accountable.  I often will ask patients to try these apps just to get an idea of how much sodium and how many carbohydrates you are taking in.   Exercise:   • Engage in moderate-to-vigorous aerobic activity for at least 40 minutes (on average) three to four times each week.  Wearables:   • Activity tracker   • Step tracker: getting 7,500 steps daily can cut your cardiac risks by 44%   Bone Health:   • Https://www.nof.org/patients/treatment/nutrition/  • Routine weight bearing exercise      If you have not yet had your COVID vaccine, I highly recommend you schedule to have one ASAP.   You are not only protecting your health but you are protecting a love one that may be more vulnerable than you if they were to contract the virus.     If you are vaccinated, please be advised you can still  contract COVID.   The goal of the vaccine is to decrease severe outcomes which so far it has been very effective.

## 2021-07-02 NOTE — PATIENT INSTRUCTIONS
It's Summer Time!    Stay hydrated when outside  If your urine starts to get darker, you are not drinking enough     Protect yourself from ticks and mosquitos  Here are the best ingredients to look for:  · DEET (N,N-diethyl-m-toluamide or N,N-diethyl-3-methyl-benzamide)  · Picaridin  · Oil of lemon eucalyptus (p-menthane-3,8-diol or PMD)  Wear light-colored pants so you can spot ticks easier  If you use a permethrin product, ONLY apply to clothing    Practice Safe Sun!    · Use sun screen SPF >30 daily, reapply regularly per directions on package  · See dermatologist for skin check regularly  · Protect your eyes with sunglasses with UV protection    Poison Ivy  If you are going into areas that may have poison ivy, prepare with a product like IvyX or other ivy blocker.  Wash your clothes and pets after being in an area with ivy when returning home. If you come in contact with poison ivy, try a product like Technu     Other things you should incorporate all year...     Diet:    • Eat vegetables, fruits, whole grain, low-fat dairy, poultry, fish, beans, nontropical vegetable oils, and nuts, but avoid red meat (i.e., Mediterranean-style diet, DASH [Dietary Approaches to Stop Hypertension] diet).  • Limit sugary drinks and sweets.  • Limit saturated and trans fat to 5% to 6% of calories.  • Limit sodium intake to 2,400 mg daily (about one teaspoon table salt [kosher/sea salt have less sodium per teaspoon]).  Weight loss / Calorie Counting Apps:    • Lose It!   • MyFitvirocyt Pal   • Works great when you try it with a partner/ friend. It takes about 15 minutes a day but studies show that this simple method of monitoring your intake can help you achieve goals as it keeps you accountable.  I often will ask patients to try these apps just to get an idea of how much sodium and how many carbohydrates you are taking in.   Exercise:   • Engage in moderate-to-vigorous aerobic activity for at least 40 minutes (on average) three to  four times each week.  Wearables:   • Activity tracker   • Step tracker: getting 7,500 steps daily can cut your cardiac risks by 44%   Bone Health:   • Https://www.nof.org/patients/treatment/nutrition/  • Routine weight bearing exercise      If you have not yet had your COVID vaccine, I highly recommend you schedule to have one ASAP.   You are not only protecting your health but you are protecting a love one that may be more vulnerable than you if they were to contract the virus.     If you are vaccinated, please be advised you can still contract COVID.   The goal of the vaccine is to decrease severe outcomes which so far it has been very effective.

## 2021-07-06 NOTE — ASSESSMENT & PLAN NOTE
Lipid abnormalities are improving with treatment.  Pharmacotherapy as ordered.  Lipids will be reassessed in 6 months.    Lab Results   Component Value Date    CHOL 175 05/03/2017    CHLPL 166 06/08/2021    TRIG 77 06/08/2021    HDL 74 (H) 06/08/2021    LDL 78 06/08/2021

## 2021-10-06 ENCOUNTER — TRANSCRIBE ORDERS (OUTPATIENT)
Dept: ADMINISTRATIVE | Facility: HOSPITAL | Age: 86
End: 2021-10-06

## 2021-10-06 DIAGNOSIS — Z12.31 SCREENING MAMMOGRAM, ENCOUNTER FOR: Primary | ICD-10-CM

## 2021-10-11 ENCOUNTER — OFFICE VISIT (OUTPATIENT)
Dept: INTERNAL MEDICINE | Facility: CLINIC | Age: 86
End: 2021-10-11

## 2021-10-11 VITALS
SYSTOLIC BLOOD PRESSURE: 130 MMHG | DIASTOLIC BLOOD PRESSURE: 72 MMHG | RESPIRATION RATE: 16 BRPM | OXYGEN SATURATION: 99 % | TEMPERATURE: 96.8 F | WEIGHT: 197.8 LBS | HEART RATE: 81 BPM | BODY MASS INDEX: 33.77 KG/M2 | HEIGHT: 64 IN

## 2021-10-11 DIAGNOSIS — S81.801A LEG WOUND, RIGHT, INITIAL ENCOUNTER: Primary | ICD-10-CM

## 2021-10-11 PROCEDURE — 99213 OFFICE O/P EST LOW 20 MIN: CPT | Performed by: NURSE PRACTITIONER

## 2021-10-11 RX ORDER — DOXYCYCLINE HYCLATE 100 MG/1
CAPSULE ORAL
COMMUNITY
Start: 2021-10-03 | End: 2022-02-09

## 2021-10-11 NOTE — PROGRESS NOTES
"        Chief Complaint  Laceration (on leg )     Subjective:      History of Present Illness {CC  Problem List  Visit  Diagnosis   Encounters  Notes  Medications  Labs  Result Review Imaging  Media :23}     Shanique Martinez presents to [facility] for wound.     1 month ago - scraped her leg with rake.   She went to  - treated with abx.   Got better.  She had been using peroxide to clean it.   Advised that this is caustic and to to use it.     She is on doxy and bactroban.    States it is getting better but wanted me to look at it.     No fever, chills.   Denies drainage from wound.       I have reviewed patient's medical history, any new submitted information provided by patient or medical assistant and updated medical record.      Objective:      Physical Exam  Skin:              Result Review  Data Reviewed:{ Labs  Result Review  Imaging  Med Tab  Media :23}                Vital Signs:   /72 (BP Location: Left arm, Patient Position: Sitting, Cuff Size: Adult)   Pulse 81   Temp 96.8 °F (36 °C) (Temporal)   Resp 16   Ht 162.6 cm (64\")   Wt 89.7 kg (197 lb 12.8 oz)   SpO2 99%   BMI 33.95 kg/m²         Requested Prescriptions      No prescriptions requested or ordered in this encounter       Routine medications provided by this office will also be refilled via pharmacy request.       Current Outpatient Medications:   •  atorvastatin (LIPITOR) 10 MG tablet, TAKE ONE TABLET BY MOUTH DAILY, Disp: 90 tablet, Rfl: 0  •  CALCIUM PO, Take 1 tablet by mouth Daily., Disp: , Rfl:   •  cyclobenzaprine (FLEXERIL) 10 MG tablet, TAKE ONE TABLET BY MOUTH THREE TIMES A DAY, Disp: 270 tablet, Rfl: 3  •  doxycycline (VIBRAMYCIN) 100 MG capsule, , Disp: , Rfl:   •  mupirocin (BACTROBAN) 2 % ointment, , Disp: , Rfl:   •  naproxen sodium (ANAPROX) 550 MG tablet, TAKE ONE TABLET BY MOUTH TWICE A DAY WITH MEALS, Disp: 60 tablet, Rfl: 4     Assessment and Plan:      Assessment and " Plan {CC Problem List  Visit Diagnosis  ROS  Review (Popup)  Health Maintenance  Quality  BestPractice  Medications  SmartSets  SnapShot Encounters  Media :23}     Problem List Items Addressed This Visit     None      Visit Diagnoses     Leg wound, right, initial encounter    -  Primary        Continue doxy and bactroban.   I gave her some non-adherent dressing as she said she will pull off scab when she changes dressing.   Tylenol if needed for any pain.     If starts to have drainage, redness, fever or chills - she will contact me.     Follow Up {Instructions Charge Capture  Follow-up Communications :23}     Return if symptoms worsen or fail to improve.    Patient was given instructions and counseling regarding her condition or for health maintenance advice. Please see specific information pulled into the AVS if appropriate.    Dragon disclaimer:   Much of this encounter note is an electronic transcription/translation of spoken language to printed text. The electronic translation of spoken language may permit erroneous, or at times, nonsensical words or phrases to be inadvertently transcribed; Although I have reviewed the note for such errors, some may still exist.     Additional Patient Counseling:       There are no Patient Instructions on file for this visit.

## 2021-11-09 RX ORDER — NAPROXEN SODIUM 550 MG/1
TABLET ORAL
Qty: 60 TABLET | Refills: 4 | Status: SHIPPED | OUTPATIENT
Start: 2021-11-09 | End: 2022-04-14

## 2021-12-06 DIAGNOSIS — E78.5 HYPERLIPIDEMIA LDL GOAL <130: ICD-10-CM

## 2021-12-07 RX ORDER — ATORVASTATIN CALCIUM 10 MG/1
TABLET, FILM COATED ORAL
Qty: 90 TABLET | Refills: 4 | Status: SHIPPED | OUTPATIENT
Start: 2021-12-07 | End: 2023-01-19

## 2022-01-17 ENCOUNTER — APPOINTMENT (OUTPATIENT)
Dept: MAMMOGRAPHY | Facility: HOSPITAL | Age: 87
End: 2022-01-17

## 2022-01-31 ENCOUNTER — HOSPITAL ENCOUNTER (OUTPATIENT)
Dept: MAMMOGRAPHY | Facility: HOSPITAL | Age: 87
Discharge: HOME | End: 2022-01-31
Admitting: NURSE PRACTITIONER

## 2022-01-31 DIAGNOSIS — Z12.31 SCREENING MAMMOGRAM, ENCOUNTER FOR: ICD-10-CM

## 2022-01-31 PROCEDURE — 77067 SCR MAMMO BI INCL CAD: CPT

## 2022-01-31 PROCEDURE — 77063 BREAST TOMOSYNTHESIS BI: CPT

## 2022-02-09 ENCOUNTER — OFFICE VISIT (OUTPATIENT)
Dept: INTERNAL MEDICINE | Facility: CLINIC | Age: 87
End: 2022-02-09

## 2022-02-09 VITALS
HEIGHT: 64 IN | SYSTOLIC BLOOD PRESSURE: 140 MMHG | WEIGHT: 199.2 LBS | OXYGEN SATURATION: 97 % | HEART RATE: 86 BPM | DIASTOLIC BLOOD PRESSURE: 82 MMHG | TEMPERATURE: 98.2 F | BODY MASS INDEX: 34.01 KG/M2

## 2022-02-09 DIAGNOSIS — Z00.00 MEDICARE ANNUAL WELLNESS VISIT, SUBSEQUENT: Primary | ICD-10-CM

## 2022-02-09 DIAGNOSIS — E78.5 HYPERLIPIDEMIA LDL GOAL <130: Chronic | ICD-10-CM

## 2022-02-09 DIAGNOSIS — M19.90 ARTHRITIS: Chronic | ICD-10-CM

## 2022-02-09 DIAGNOSIS — L03.115 CELLULITIS OF RIGHT LOWER EXTREMITY: ICD-10-CM

## 2022-02-09 PROCEDURE — 1159F MED LIST DOCD IN RCRD: CPT | Performed by: NURSE PRACTITIONER

## 2022-02-09 PROCEDURE — 99214 OFFICE O/P EST MOD 30 MIN: CPT | Performed by: NURSE PRACTITIONER

## 2022-02-09 PROCEDURE — G0439 PPPS, SUBSEQ VISIT: HCPCS | Performed by: NURSE PRACTITIONER

## 2022-02-09 PROCEDURE — 1126F AMNT PAIN NOTED NONE PRSNT: CPT | Performed by: NURSE PRACTITIONER

## 2022-02-09 PROCEDURE — 1170F FXNL STATUS ASSESSED: CPT | Performed by: NURSE PRACTITIONER

## 2022-02-09 RX ORDER — CEPHALEXIN 500 MG/1
500 CAPSULE ORAL 2 TIMES DAILY
Qty: 14 CAPSULE | Refills: 0 | Status: SHIPPED | OUTPATIENT
Start: 2022-02-09 | End: 2022-02-16

## 2022-02-09 RX ORDER — PREDNISOLONE ACETATE 10 MG/ML
SUSPENSION/ DROPS OPHTHALMIC
COMMUNITY
Start: 2021-11-17

## 2022-02-09 NOTE — PROGRESS NOTES
The ABCs of the Annual Wellness Visit  Subsequent Medicare Wellness Visit    Chief Complaint   Patient presents with   • Medicare Wellness-subsequent      Subjective    History of Present Illness:  Shanique Martinez is a 86 y.o. female who presents for a Subsequent Medicare Wellness Visit and follow up on chronic conditions: OA, hyperlipidemia      LV: total cholesterol improved.  Down to 166 from 228, LDL down to 78 from 132.  Continues statin.  No CP, SOA.       OA: Chronic, knees.    Thinking of proceeding with BL knees soon with ortho.  We went over exercises she should do to strengthen legs.       Dexa done today in office.    (See media: letters.)   Results discussed with patient.     One fall one month ago in house.  States feet got tangled up.  No dizziness.     New Problem:   R leg - hit is on coffee table a few days ago.  Weeping. No fever or chills.       Wt Readings from Last 4 Encounters:   02/09/22 90.4 kg (199 lb 3.2 oz)   10/11/21 89.7 kg (197 lb 12.8 oz)   07/02/21 91.6 kg (202 lb)   12/10/20 94.3 kg (207 lb 12.8 oz)       Weight trend is stable.    Her cardiovascular risks are:    [] No Known risk factors    [] Hypertension    [x] Hyperlipidemia  [] Diabetes     [x] Obesity  [] Family history    [] Current or hx tobacco use  [x] Sedentary lifestyle       Male:   [] Testosterone use     Mobility    [] Ambulates independently  [x] Ambulates with cane   [] Ambulates with quad cane  [] Ambulates with rollator   [] Ambulates with walker   [] Mobile with wheelchair   [] Mobile with electric wheelchair     Continence    [x] Continent of bowel and bladder  [] Incontinent bowel and bladder   [] Incontinent bladder    [] Incontinent bowel       Lives alone.   Has a monitor in event she falls.   States neighbors check on her.       The following portions of the patient's history were reviewed and   updated as appropriate: allergies, current medications, past family history, past medical history, past social  history, past surgical history and problem list.    Compared to one year ago, the patient feels her physical   health is the same.    Compared to one year ago, the patient feels her mental   health is the same.     Recent Hospitalizations:  She was not admitted to the hospital during the last year.       Current Medical Providers:  Patient Care Team:  Alvarez Ron III, NP-C as PCP - General (Family Medicine)  Sigrid Clark MD as Consulting Physician (Orthopedic Surgery)  Kanika Montoya PA (Physician Assistant)    Outpatient Medications Prior to Visit   Medication Sig Dispense Refill   • atorvastatin (LIPITOR) 10 MG tablet TAKE ONE TABLET BY MOUTH DAILY 90 tablet 4   • CALCIUM PO Take 1 tablet by mouth Daily.     • cyclobenzaprine (FLEXERIL) 10 MG tablet TAKE ONE TABLET BY MOUTH THREE TIMES A  tablet 3   • naproxen sodium (ANAPROX) 550 MG tablet TAKE ONE TABLET BY MOUTH TWICE A DAY WITH MEALS 60 tablet 4   • prednisoLONE acetate (PRED FORTE) 1 % ophthalmic suspension      • doxycycline (VIBRAMYCIN) 100 MG capsule      • mupirocin (BACTROBAN) 2 % ointment        No facility-administered medications prior to visit.       No opioid medication identified on active medication list. I have reviewed chart for other potential  high risk medication/s and harmful drug interactions in the elderly.          Aspirin is not on active medication list.  Aspirin use is not indicated based on review of current medical condition/s. Risk of harm outweighs potential benefits.  .    Patient Active Problem List   Diagnosis   • Hyperlipidemia LDL goal <130   • SI joint arthritis   • Arthritis   • Encounter for long-term (current) drug use   • H/O hysterectomy for benign disease   • Menopause     Advance Care Planning  Advance Directive is on file.  ACP discussion was held with the patient during this visit. Patient has an advance directive in EMR which is still valid.           Objective    Vitals:    02/09/22 1255  "02/09/22 1317   BP: 140/82    BP Location: Right arm    Patient Position: Sitting    Cuff Size: Adult    Pulse: 107 86   Temp: 98.2 °F (36.8 °C)    TempSrc: Temporal    SpO2: 97%    Weight: 90.4 kg (199 lb 3.2 oz)    Height: 162.6 cm (64\")    PainSc: 0-No pain      BMI Readings from Last 1 Encounters:   02/09/22 34.19 kg/m²   BMI is above normal parameters. Recommendations include: exercise counseling    Does the patient have evidence of cognitive impairment? No    Physical Exam  Vitals reviewed.   Constitutional:       Appearance: Normal appearance. She is well-developed. She is obese.   HENT:      Head:      Comments: Wearing mask due to COVID   Neck:      Thyroid: No thyromegaly.   Cardiovascular:      Rate and Rhythm: Normal rate and regular rhythm.      Pulses: Normal pulses.      Heart sounds: Normal heart sounds.   Pulmonary:      Effort: Pulmonary effort is normal.      Breath sounds: Normal breath sounds.      Comments: E/U, no crackles   Abdominal:      General: Bowel sounds are normal.      Palpations: Abdomen is soft.   Musculoskeletal:      Cervical back: Normal range of motion and neck supple.      Comments: Trace BL lower extremity edema    Lymphadenopathy:      Cervical: No cervical adenopathy.   Skin:     General: Skin is warm and dry.      Capillary Refill: Capillary refill takes 2 to 3 seconds.          Neurological:      Mental Status: She is alert and oriented to person, place, and time.   Psychiatric:         Mood and Affect: Mood normal.         Behavior: Behavior normal. Behavior is cooperative.         Thought Content: Thought content normal.         Judgment: Judgment normal.                 HEALTH RISK ASSESSMENT    Smoking Status:  Social History     Tobacco Use   Smoking Status Never Smoker   Smokeless Tobacco Never Used     Alcohol Consumption:  Social History     Substance and Sexual Activity   Alcohol Use No     Fall Risk Screen:    STEADI Fall Risk Assessment was completed, and " patient is at MODERATE risk for falls. Assessment completed on:2/9/2022    Depression Screening:  PHQ-2/PHQ-9 Depression Screening 2/9/2022   Little interest or pleasure in doing things 0   Feeling down, depressed, or hopeless 0   Total Score 0       Health Habits and Functional and Cognitive Screening:  Functional & Cognitive Status 2/9/2022   Do you have difficulty preparing food and eating? No   Do you have difficulty bathing yourself, getting dressed or grooming yourself? No   Do you have difficulty using the toilet? No   Do you have difficulty moving around from place to place? No   Do you have trouble with steps or getting out of a bed or a chair? Yes   Current Diet Well Balanced Diet   Dental Exam Up to date   Eye Exam Up to date   Exercise (times per week) 0 times per week   Current Exercises Include No Regular Exercise   Current Exercise Activities Include -   Do you need help using the phone?  No   Are you deaf or do you have serious difficulty hearing?  No   Do you need help with transportation? No   Do you need help shopping? No   Do you need help preparing meals?  No   Do you need help with housework?  No   Do you need help with laundry? No   Do you need help taking your medications? No   Do you need help managing money? No   Do you ever drive or ride in a car without wearing a seat belt? No   Have you felt unusual stress, anger or loneliness in the last month? Yes   Who do you live with? Alone   If you need help, do you have trouble finding someone available to you? No   Have you been bothered in the last four weeks by sexual problems? No   Do you have difficulty concentrating, remembering or making decisions? No       Age-appropriate Screening Schedule:  Refer to the list below for future screening recommendations based on patient's age, sex and/or medical conditions. Orders for these recommended tests are listed in the plan section. The patient has been provided with a written plan.    Health  Maintenance   Topic Date Due   • TDAP/TD VACCINES (1 - Tdap) Never done   • ZOSTER VACCINE (2 of 3) 04/03/2013   • LIPID PANEL  06/08/2022   • DXA SCAN  02/09/2024   • INFLUENZA VACCINE  Completed              Assessment/Plan   CMS Preventative Services Quick Reference  Risk Factors Identified During Encounter  Fall Risk-High or Moderate  The above risks/problems have been discussed with the patient.  Follow up actions/plans if indicated are seen below in the Assessment/Plan Section.  Pertinent information has been shared with the patient in the After Visit Summary.    Problem List Items Addressed This Visit        Cardiac and Vasculature    Hyperlipidemia LDL goal <130 (Chronic)    Overview     Current medication: lipitor 10 mg daily          Current Assessment & Plan     Lipid abnormalities are improving with treatment.  Pharmacotherapy as ordered.  Lipids will be reassessed in 6 months.    Lab Results   Component Value Date    CHOL 175 05/03/2017    CHLPL 166 06/08/2021    TRIG 77 06/08/2021    HDL 74 (H) 06/08/2021    LDL 78 06/08/2021               Musculoskeletal and Injuries    Arthritis (Chronic)    Current Assessment & Plan     Continues naproxen.     Always take with food and not when dehydrated (not eating or drinking)            Other Visit Diagnoses     Medicare annual wellness visit, subsequent    -  Primary    Cellulitis of right lower extremity        Relevant Medications    cephalexin (Keflex) 500 MG capsule          Follow Up:   Return in about 6 months (around 8/9/2022).     An After Visit Summary and PPPS were made available to the patient.

## 2022-02-10 LAB
ALBUMIN SERPL-MCNC: 4.1 G/DL (ref 3.6–4.6)
ALBUMIN/GLOB SERPL: 1.6 {RATIO} (ref 1.2–2.2)
ALP SERPL-CCNC: 84 IU/L (ref 44–121)
ALT SERPL-CCNC: 19 IU/L (ref 0–32)
AST SERPL-CCNC: 21 IU/L (ref 0–40)
BILIRUB SERPL-MCNC: 0.5 MG/DL (ref 0–1.2)
BUN SERPL-MCNC: 24 MG/DL (ref 8–27)
BUN/CREAT SERPL: 27 (ref 12–28)
CALCIUM SERPL-MCNC: 9.7 MG/DL (ref 8.7–10.3)
CHLORIDE SERPL-SCNC: 104 MMOL/L (ref 96–106)
CHOLEST SERPL-MCNC: 189 MG/DL (ref 100–199)
CO2 SERPL-SCNC: 22 MMOL/L (ref 20–29)
CREAT SERPL-MCNC: 0.89 MG/DL (ref 0.57–1)
GLOBULIN SER CALC-MCNC: 2.5 G/DL (ref 1.5–4.5)
GLUCOSE SERPL-MCNC: 90 MG/DL (ref 65–99)
HDLC SERPL-MCNC: 74 MG/DL
LDLC SERPL CALC-MCNC: 103 MG/DL (ref 0–99)
LDLC/HDLC SERPL: 1.4 RATIO (ref 0–3.2)
POTASSIUM SERPL-SCNC: 4.3 MMOL/L (ref 3.5–5.2)
PROT SERPL-MCNC: 6.6 G/DL (ref 6–8.5)
SODIUM SERPL-SCNC: 141 MMOL/L (ref 134–144)
TRIGL SERPL-MCNC: 65 MG/DL (ref 0–149)
VLDLC SERPL CALC-MCNC: 12 MG/DL (ref 5–40)

## 2022-03-02 ENCOUNTER — OFFICE VISIT (OUTPATIENT)
Dept: INTERNAL MEDICINE | Facility: CLINIC | Age: 87
End: 2022-03-02

## 2022-03-02 VITALS
SYSTOLIC BLOOD PRESSURE: 134 MMHG | HEART RATE: 99 BPM | DIASTOLIC BLOOD PRESSURE: 78 MMHG | WEIGHT: 200 LBS | OXYGEN SATURATION: 98 % | TEMPERATURE: 98.2 F | HEIGHT: 64 IN | BODY MASS INDEX: 34.15 KG/M2

## 2022-03-02 DIAGNOSIS — Z87.828 H/O OPEN LEG WOUND: Primary | ICD-10-CM

## 2022-03-02 PROCEDURE — 99213 OFFICE O/P EST LOW 20 MIN: CPT | Performed by: NURSE PRACTITIONER

## 2022-03-02 PROCEDURE — 90714 TD VACC NO PRESV 7 YRS+ IM: CPT | Performed by: NURSE PRACTITIONER

## 2022-03-02 PROCEDURE — 90471 IMMUNIZATION ADMIN: CPT | Performed by: NURSE PRACTITIONER

## 2022-03-02 RX ORDER — DOXYCYCLINE HYCLATE 100 MG/1
100 CAPSULE ORAL 2 TIMES DAILY
COMMUNITY
Start: 2022-02-26 | End: 2022-04-14

## 2022-03-02 NOTE — PROGRESS NOTES
"Chief Complaint  Lower Extremity Issue (right leg swelling/wound)    Subjective          Shanique Martinez presents to [facility]  History of Present Illness      Patient is a pleasant 86-year-old who usually sees Keith Ron NP here in the office.  Patient is here today due to right leg swelling and multiple/Chronic leg wounds to bilateral legs measuring LLL 1.5 x 1.5 cm inner and 1.0 x 2.0 cm outer. RLL 2.0 x 2.0 cm.    Denies any fever or chills at this time. [facility] her on Doxycyline 100 mg bid x 10 days. She is wearing a non-stick dressing to her RLL that is weeping. Feet and ankles are swelling.       Objective   Vital Signs:   /78 (BP Location: Left arm, Patient Position: Sitting, Cuff Size: Adult)   Pulse 99   Temp 98.2 °F (36.8 °C)   Ht 162.6 cm (64\")   Wt 90.7 kg (200 lb)   SpO2 98%   BMI 34.33 kg/m²     Physical Exam  Vitals and nursing note reviewed.   Constitutional:       Appearance: Normal appearance. She is obese.   HENT:      Head: Normocephalic.      Nose: Nose normal.      Mouth/Throat:      Mouth: Mucous membranes are moist.   Eyes:      Pupils: Pupils are equal, round, and reactive to light.   Cardiovascular:      Rate and Rhythm: Normal rate and regular rhythm.      Pulses: Normal pulses.      Heart sounds: Normal heart sounds.      Comments: Slight edema noted to bilateral lower legs/ankles  Pulmonary:      Effort: Pulmonary effort is normal. No respiratory distress.      Breath sounds: Normal breath sounds. No stridor. No wheezing, rhonchi or rales.   Chest:      Chest wall: No tenderness.   Musculoskeletal:         General: Swelling and tenderness present.      Right lower leg: Edema present.      Left lower leg: Edema present.   Skin:     General: Skin is warm.      Capillary Refill: Capillary refill takes less than 2 seconds.      Findings: Erythema present.      Comments: multiple/Chronic leg wounds to " bilateral legs measuring LLL 1.5 x 1.5 cm inner and 1.0 x 2.0 cm outer. RLL 2.0 x 2.0 cm.    Denies any fever or chills at this time.   Neurological:      Mental Status: She is alert and oriented to person, place, and time.   Psychiatric:         Behavior: Behavior normal.        Result Review :            Lipid Panel With LDL / HDL Ratio (02/09/2022 9:04 AM)  Comprehensive Metabolic Panel (02/09/2022 9:04 AM)  Office Visit with Alvarez Ron III, NP-C (02/09/2022)  Office Visit with Alvarez Ron III, NP-C (10/11/2021)       Assessment and Plan    Diagnoses and all orders for this visit:    1. H/O open leg wound (Primary)  -     CBC & Differential  -     Comprehensive Metabolic Panel  -     Ambulatory Referral to Wound Clinic    Other orders  -     Td Vaccine Greater Than or Equal To 8yo Preservative Free IM      We will get labs on today's office visit and contact her with the finalized results tomorrow.  A referral was placed for wound care clinic urgently due to these chronic and new leg wounds bilaterally.  A tetanus was given on today's office visit because it was not given at the [facility].  She was due for this on today's office visit.  Patient will continue to watch for signs and symptoms of increased infection if she notices any increased symptoms such as swelling/redness, increased pain she will contact her office.  If she develops any fever or chills she needs to go the emergency room.  Patient will also cleanse the areas twice daily with Dial soap and water and pat dry.  She will continue using the nonstick adhesive dressings until she is seen by wound care.  As always if patient has any questions or concerns she can feel free to contact the office.  Patient agrees with this treatment plan at this time.    Follow Up   Return if symptoms worsen or fail to improve.  Patient was given instructions and counseling regarding her condition or for health maintenance advice.  Please see specific information pulled into the AVS if appropriate.

## 2022-03-02 NOTE — PATIENT INSTRUCTIONS
Wound Care, Adult  Taking care of your wound properly can help to prevent pain, infection, and scarring. It can also help your wound heal more quickly. Follow instructions from your health care provider about how to care for your wound.  Supplies needed:  · Soap and water.  · Wound cleanser.  · Gauze.  · If needed, a clean bandage (dressing) or other type of wound dressing material to cover or place in the wound. Follow your health care provider's instructions about what dressing supplies to use.  · Cream or ointment to apply to the wound, if told by your health care provider.  How to care for your wound  Cleaning the wound  Ask your health care provider how to clean the wound. This may include:  · Using mild soap and water or a wound cleanser.  · Using a clean gauze to pat the wound dry after cleaning it. Do not rub or scrub the wound.  Dressing care  · Wash your hands with soap and water for at least 20 seconds before and after you change the dressing. If soap and water are not available, use hand .  · Change your dressing as told by your health care provider. This may include:  ? Cleaning or rinsing out (irrigating) the wound.  ? Placing a dressing over the wound or in the wound (packing).  ? Covering the wound with an outer dressing.  · Leave any stitches (sutures), skin glue, or adhesive strips in place. These skin closures may need to stay in place for 2 weeks or longer. If adhesive strip edges start to loosen and curl up, you may trim the loose edges. Do not remove adhesive strips completely unless your health care provider tells you to do that.  · Ask your health care provider when you can leave the wound uncovered.  Checking for infection  Check your wound area every day for signs of infection. Check for:  · More redness, swelling, or pain.  · Fluid or blood.  · Warmth.  · Pus or a bad smell.    Follow these instructions at home  Medicines  · If you were prescribed an antibiotic medicine, cream, or  ointment, take or apply it as told by your health care provider. Do not stop using the antibiotic even if your condition improves.  · If you were prescribed pain medicine, take it 30 minutes before you do any wound care or as told by your health care provider.  · Take over-the-counter and prescription medicines only as told by your health care provider.  Eating and drinking  · Eat a diet that includes protein, vitamin A, vitamin C, and other nutrient-rich foods to help the wound heal.  ? Foods rich in protein include meat, fish, eggs, dairy, beans, and nuts.  ? Foods rich in vitamin A include carrots and dark green, leafy vegetables.  ? Foods rich in vitamin C include citrus fruits, tomatoes, broccoli, and peppers.  · Drink enough fluid to keep your urine pale yellow.  General instructions  · Do not take baths, swim, use a hot tub, or do anything that would put the wound underwater until your health care provider approves. Ask your health care provider if you may take showers. You may only be allowed to take sponge baths.  · Do not scratch or pick at the wound. Keep it covered as told by your health care provider.  · Return to your normal activities as told by your health care provider. Ask your health care provider what activities are safe for you.  · Protect your wound from the sun when you are outside for the first 6 months, or for as long as told by your health care provider. Cover up the scar area or apply sunscreen that has an SPF of at least 30.  · Do not use any products that contain nicotine or tobacco, such as cigarettes, e-cigarettes, and chewing tobacco. These may delay wound healing. If you need help quitting, ask your health care provider.  · Keep all follow-up visits as told by your health care provider. This is important.  Contact a health care provider if:  · You received a tetanus shot and you have swelling, severe pain, redness, or bleeding at the injection site.  · Your pain is not controlled  with medicine.  · You have any of these signs of infection:  ? More redness, swelling, or pain around the wound.  ? Fluid or blood coming from the wound.  ? Warmth coming from the wound.  ? Pus or a bad smell coming from the wound.  ? A fever or chills.  · You are nauseous or you vomit.  · You are dizzy.  Get help right away if:  · You have a red streak of skin near the area around your wound.  · Your wound has been closed with staples, sutures, skin glue, or adhesive strips and it begins to open up and separate.  · Your wound is bleeding, and the bleeding does not stop with gentle pressure.  · You have a rash.  · You faint.  · You have trouble breathing.  These symptoms may represent a serious problem that is an emergency. Do not wait to see if the symptoms will go away. Get medical help right away. Call your local emergency services (911 in the [place] Do not drive yourself to the hospital.  Summary  · Always wash your hands with soap and water for at least 20 seconds before and after changing your dressing.  · Change your dressing as told by your health care provider.  · To help with healing, eat foods that are rich in protein, vitamin A, vitamin C, and other nutrients.  · Check your wound every day for signs of infection. Contact your health care provider if you suspect that your wound is infected.  This information is not intended to replace advice given to you by your health care provider. Make sure you discuss any questions you have with your health care provider.  Document Revised: 10/02/2020 Document Reviewed: 10/02/2020  [employer]vier Patient Education © 2021 [employer]

## 2022-03-03 LAB
ALBUMIN SERPL-MCNC: 4 G/DL (ref 3.6–4.6)
ALBUMIN/GLOB SERPL: 1.5 {RATIO} (ref 1.2–2.2)
ALP SERPL-CCNC: 96 IU/L (ref 44–121)
ALT SERPL-CCNC: 18 IU/L (ref 0–32)
AST SERPL-CCNC: 24 IU/L (ref 0–40)
BASOPHILS # BLD AUTO: 0.1 X10E3/UL (ref 0–0.2)
BASOPHILS NFR BLD AUTO: 1 %
BILIRUB SERPL-MCNC: 0.3 MG/DL (ref 0–1.2)
BUN SERPL-MCNC: 24 MG/DL (ref 8–27)
BUN/CREAT SERPL: 30 (ref 12–28)
CALCIUM SERPL-MCNC: 9.8 MG/DL (ref 8.7–10.3)
CHLORIDE SERPL-SCNC: 101 MMOL/L (ref 96–106)
CO2 SERPL-SCNC: 22 MMOL/L (ref 20–29)
CREAT SERPL-MCNC: 0.79 MG/DL (ref 0.57–1)
EGFR GENE MUT ANL BLD/T: 73 ML/MIN/1.73
EOSINOPHIL # BLD AUTO: 0.2 X10E3/UL (ref 0–0.4)
EOSINOPHIL NFR BLD AUTO: 2 %
ERYTHROCYTE [DISTWIDTH] IN BLOOD BY AUTOMATED COUNT: 12.5 % (ref 11.7–15.4)
GLOBULIN SER CALC-MCNC: 2.6 G/DL (ref 1.5–4.5)
GLUCOSE SERPL-MCNC: 101 MG/DL (ref 65–99)
HCT VFR BLD AUTO: 37.8 % (ref 34–46.6)
HGB BLD-MCNC: 12.7 G/DL (ref 11.1–15.9)
IMM GRANULOCYTES # BLD AUTO: 0.1 X10E3/UL (ref 0–0.1)
IMM GRANULOCYTES NFR BLD AUTO: 1 %
LYMPHOCYTES # BLD AUTO: 2 X10E3/UL (ref 0.7–3.1)
LYMPHOCYTES NFR BLD AUTO: 20 %
MCH RBC QN AUTO: 30.7 PG (ref 26.6–33)
MCHC RBC AUTO-ENTMCNC: 33.6 G/DL (ref 31.5–35.7)
MCV RBC AUTO: 91 FL (ref 79–97)
MONOCYTES # BLD AUTO: 0.7 X10E3/UL (ref 0.1–0.9)
MONOCYTES NFR BLD AUTO: 7 %
NEUTROPHILS # BLD AUTO: 6.9 X10E3/UL (ref 1.4–7)
NEUTROPHILS NFR BLD AUTO: 69 %
PLATELET # BLD AUTO: 407 X10E3/UL (ref 150–450)
POTASSIUM SERPL-SCNC: 4.4 MMOL/L (ref 3.5–5.2)
PROT SERPL-MCNC: 6.6 G/DL (ref 6–8.5)
RBC # BLD AUTO: 4.14 X10E6/UL (ref 3.77–5.28)
SODIUM SERPL-SCNC: 140 MMOL/L (ref 134–144)
WBC # BLD AUTO: 9.7 X10E3/UL (ref 3.4–10.8)

## 2022-03-10 ENCOUNTER — OFFICE VISIT (OUTPATIENT)
Dept: WOUND CARE | Facility: HOSPITAL | Age: 87
End: 2022-03-10

## 2022-03-10 ENCOUNTER — LAB REQUISITION (OUTPATIENT)
Dept: LAB | Facility: HOSPITAL | Age: 87
End: 2022-03-10

## 2022-03-10 ENCOUNTER — TRANSCRIBE ORDERS (OUTPATIENT)
Dept: ADMINISTRATIVE | Facility: HOSPITAL | Age: 87
End: 2022-03-10

## 2022-03-10 DIAGNOSIS — I70.248 ATHSCL NATIVE ART OF LEFT LEG W ULCER OTH PRT LOWER LEFT LEG: Primary | ICD-10-CM

## 2022-03-10 DIAGNOSIS — I70.238 ATHEROSCLEROSIS OF NATIVE ARTERIES OF RIGHT LEG WITH ULCERATION OF OTHER PART OF LOWER LEG: ICD-10-CM

## 2022-03-10 PROCEDURE — 87070 CULTURE OTHR SPECIMN AEROBIC: CPT | Performed by: NURSE PRACTITIONER

## 2022-03-10 PROCEDURE — 87075 CULTR BACTERIA EXCEPT BLOOD: CPT | Performed by: NURSE PRACTITIONER

## 2022-03-10 PROCEDURE — 87186 SC STD MICRODIL/AGAR DIL: CPT | Performed by: NURSE PRACTITIONER

## 2022-03-10 PROCEDURE — G0463 HOSPITAL OUTPT CLINIC VISIT: HCPCS

## 2022-03-10 PROCEDURE — 87077 CULTURE AEROBIC IDENTIFY: CPT | Performed by: NURSE PRACTITIONER

## 2022-03-10 PROCEDURE — 87015 SPECIMEN INFECT AGNT CONCNTJ: CPT | Performed by: NURSE PRACTITIONER

## 2022-03-10 PROCEDURE — 87205 SMEAR GRAM STAIN: CPT | Performed by: NURSE PRACTITIONER

## 2022-03-13 ENCOUNTER — APPOINTMENT (OUTPATIENT)
Dept: CT IMAGING | Facility: HOSPITAL | Age: 87
End: 2022-03-13

## 2022-03-13 ENCOUNTER — HOSPITAL ENCOUNTER (OUTPATIENT)
Facility: HOSPITAL | Age: 87
Setting detail: OBSERVATION
Discharge: HOME | End: 2022-03-15
Attending: EMERGENCY MEDICINE | Admitting: EMERGENCY MEDICINE

## 2022-03-13 DIAGNOSIS — R26.2 UNABLE TO AMBULATE: ICD-10-CM

## 2022-03-13 DIAGNOSIS — R11.0 NAUSEA: ICD-10-CM

## 2022-03-13 DIAGNOSIS — R42 DIZZINESS: Primary | ICD-10-CM

## 2022-03-13 LAB
ALBUMIN SERPL-MCNC: 4 G/DL (ref 3.5–5.2)
ALBUMIN/GLOB SERPL: 1.4 G/DL
ALP SERPL-CCNC: 105 U/L (ref 39–117)
ALT SERPL W P-5'-P-CCNC: 18 U/L (ref 1–33)
ANION GAP SERPL CALCULATED.3IONS-SCNC: 12 MMOL/L (ref 5–15)
AST SERPL-CCNC: 27 U/L (ref 1–32)
BACTERIA SPEC AEROBE CULT: ABNORMAL
BACTERIA SPEC AEROBE CULT: ABNORMAL
BASOPHILS # BLD AUTO: 0.05 10*3/MM3 (ref 0–0.2)
BASOPHILS NFR BLD AUTO: 0.5 % (ref 0–1.5)
BILIRUB SERPL-MCNC: 0.4 MG/DL (ref 0–1.2)
BUN SERPL-MCNC: 19 MG/DL (ref 8–23)
BUN/CREAT SERPL: 25 (ref 7–25)
CALCIUM SPEC-SCNC: 9.4 MG/DL (ref 8.6–10.5)
CHLORIDE SERPL-SCNC: 102 MMOL/L (ref 98–107)
CO2 SERPL-SCNC: 25 MMOL/L (ref 22–29)
CREAT SERPL-MCNC: 0.76 MG/DL (ref 0.57–1)
DEPRECATED RDW RBC AUTO: 40.1 FL (ref 37–54)
EGFRCR SERPLBLD CKD-EPI 2021: 76.9 ML/MIN/1.73
EOSINOPHIL # BLD AUTO: 0.11 10*3/MM3 (ref 0–0.4)
EOSINOPHIL NFR BLD AUTO: 1.1 % (ref 0.3–6.2)
ERYTHROCYTE [DISTWIDTH] IN BLOOD BY AUTOMATED COUNT: 12.4 % (ref 12.3–15.4)
GLOBULIN UR ELPH-MCNC: 2.9 GM/DL
GLUCOSE SERPL-MCNC: 120 MG/DL (ref 65–99)
GRAM STN SPEC: ABNORMAL
HCT VFR BLD AUTO: 39.7 % (ref 34–46.6)
HGB BLD-MCNC: 13.5 G/DL (ref 12–15.9)
IMM GRANULOCYTES # BLD AUTO: 0.05 10*3/MM3 (ref 0–0.05)
IMM GRANULOCYTES NFR BLD AUTO: 0.5 % (ref 0–0.5)
LYMPHOCYTES # BLD AUTO: 0.83 10*3/MM3 (ref 0.7–3.1)
LYMPHOCYTES NFR BLD AUTO: 8 % (ref 19.6–45.3)
MCH RBC QN AUTO: 30.8 PG (ref 26.6–33)
MCHC RBC AUTO-ENTMCNC: 34 G/DL (ref 31.5–35.7)
MCV RBC AUTO: 90.6 FL (ref 79–97)
MONOCYTES # BLD AUTO: 0.51 10*3/MM3 (ref 0.1–0.9)
MONOCYTES NFR BLD AUTO: 4.9 % (ref 5–12)
NEUTROPHILS NFR BLD AUTO: 8.78 10*3/MM3 (ref 1.7–7)
NEUTROPHILS NFR BLD AUTO: 85 % (ref 42.7–76)
NRBC BLD AUTO-RTO: 0 /100 WBC (ref 0–0.2)
PLATELET # BLD AUTO: 412 10*3/MM3 (ref 140–450)
PMV BLD AUTO: 8.9 FL (ref 6–12)
POTASSIUM SERPL-SCNC: 3.8 MMOL/L (ref 3.5–5.2)
PROT SERPL-MCNC: 6.9 G/DL (ref 6–8.5)
RBC # BLD AUTO: 4.38 10*6/MM3 (ref 3.77–5.28)
SARS-COV-2 RNA PNL SPEC NAA+PROBE: NOT DETECTED
SODIUM SERPL-SCNC: 139 MMOL/L (ref 136–145)
WBC NRBC COR # BLD: 10.33 10*3/MM3 (ref 3.4–10.8)

## 2022-03-13 PROCEDURE — 70450 CT HEAD/BRAIN W/O DYE: CPT

## 2022-03-13 PROCEDURE — 99284 EMERGENCY DEPT VISIT MOD MDM: CPT

## 2022-03-13 PROCEDURE — 25010000002 ONDANSETRON PER 1 MG: Performed by: EMERGENCY MEDICINE

## 2022-03-13 PROCEDURE — G0378 HOSPITAL OBSERVATION PER HR: HCPCS

## 2022-03-13 PROCEDURE — 80053 COMPREHEN METABOLIC PANEL: CPT | Performed by: EMERGENCY MEDICINE

## 2022-03-13 PROCEDURE — 96374 THER/PROPH/DIAG INJ IV PUSH: CPT

## 2022-03-13 PROCEDURE — 87635 SARS-COV-2 COVID-19 AMP PRB: CPT | Performed by: EMERGENCY MEDICINE

## 2022-03-13 PROCEDURE — 85025 COMPLETE CBC W/AUTO DIFF WBC: CPT | Performed by: EMERGENCY MEDICINE

## 2022-03-13 PROCEDURE — C9803 HOPD COVID-19 SPEC COLLECT: HCPCS

## 2022-03-13 PROCEDURE — 99204 OFFICE O/P NEW MOD 45 MIN: CPT | Performed by: PSYCHIATRY & NEUROLOGY

## 2022-03-13 RX ORDER — ONDANSETRON 4 MG/1
4 TABLET, FILM COATED ORAL EVERY 6 HOURS PRN
Status: DISCONTINUED | OUTPATIENT
Start: 2022-03-13 | End: 2022-03-15 | Stop reason: HOSPADM

## 2022-03-13 RX ORDER — ACETAMINOPHEN 325 MG/1
650 TABLET ORAL EVERY 4 HOURS PRN
Status: DISCONTINUED | OUTPATIENT
Start: 2022-03-13 | End: 2022-03-15 | Stop reason: HOSPADM

## 2022-03-13 RX ORDER — ONDANSETRON 2 MG/ML
4 INJECTION INTRAMUSCULAR; INTRAVENOUS EVERY 6 HOURS PRN
Status: DISCONTINUED | OUTPATIENT
Start: 2022-03-13 | End: 2022-03-15 | Stop reason: HOSPADM

## 2022-03-13 RX ORDER — SODIUM CHLORIDE 0.9 % (FLUSH) 0.9 %
10 SYRINGE (ML) INJECTION AS NEEDED
Status: DISCONTINUED | OUTPATIENT
Start: 2022-03-13 | End: 2022-03-15 | Stop reason: HOSPADM

## 2022-03-13 RX ORDER — ONDANSETRON 2 MG/ML
4 INJECTION INTRAMUSCULAR; INTRAVENOUS ONCE
Status: COMPLETED | OUTPATIENT
Start: 2022-03-13 | End: 2022-03-13

## 2022-03-13 RX ORDER — CHOLECALCIFEROL (VITAMIN D3) 125 MCG
5 CAPSULE ORAL NIGHTLY PRN
Status: DISCONTINUED | OUTPATIENT
Start: 2022-03-13 | End: 2022-03-15 | Stop reason: HOSPADM

## 2022-03-13 RX ORDER — NITROGLYCERIN 0.4 MG/1
0.4 TABLET SUBLINGUAL
Status: DISCONTINUED | OUTPATIENT
Start: 2022-03-13 | End: 2022-03-15 | Stop reason: HOSPADM

## 2022-03-13 RX ORDER — ATORVASTATIN CALCIUM 10 MG/1
10 TABLET, FILM COATED ORAL DAILY
Status: DISCONTINUED | OUTPATIENT
Start: 2022-03-13 | End: 2022-03-15 | Stop reason: HOSPADM

## 2022-03-13 RX ORDER — CYCLOBENZAPRINE HCL 10 MG
10 TABLET ORAL 3 TIMES DAILY
Status: DISCONTINUED | OUTPATIENT
Start: 2022-03-13 | End: 2022-03-15 | Stop reason: HOSPADM

## 2022-03-13 RX ORDER — MECLIZINE HYDROCHLORIDE 25 MG/1
25 TABLET ORAL 3 TIMES DAILY PRN
Status: DISCONTINUED | OUTPATIENT
Start: 2022-03-13 | End: 2022-03-15 | Stop reason: HOSPADM

## 2022-03-13 RX ORDER — SODIUM CHLORIDE 0.9 % (FLUSH) 0.9 %
10 SYRINGE (ML) INJECTION EVERY 12 HOURS SCHEDULED
Status: DISCONTINUED | OUTPATIENT
Start: 2022-03-13 | End: 2022-03-15 | Stop reason: HOSPADM

## 2022-03-13 RX ADMIN — MECLIZINE HYDROCHLORIDE 25 MG: 25 TABLET ORAL at 15:20

## 2022-03-13 RX ADMIN — SODIUM CHLORIDE, POTASSIUM CHLORIDE, SODIUM LACTATE AND CALCIUM CHLORIDE 1000 ML: 600; 310; 30; 20 INJECTION, SOLUTION INTRAVENOUS at 12:22

## 2022-03-13 RX ADMIN — Medication 5 MG: at 23:48

## 2022-03-13 RX ADMIN — Medication 10 ML: at 21:33

## 2022-03-13 RX ADMIN — CYCLOBENZAPRINE 10 MG: 10 TABLET, FILM COATED ORAL at 21:32

## 2022-03-13 RX ADMIN — ONDANSETRON 4 MG: 2 INJECTION INTRAMUSCULAR; INTRAVENOUS at 13:05

## 2022-03-13 NOTE — ED PROVIDER NOTES
EMERGENCY DEPARTMENT ENCOUNTER    Room Number:  16/16  Date of encounter:  3/13/2022  PCP: Alvarez Ron III, NP-C  Historian: Patient, daughter      HPI:  Chief Complaint: Dizziness  A complete HPI/ROS/PMH/PSH/SH/FH are unobtainable due to: None    Context: Shanique Martinez is a 86 y.o. female who presents to the ED c/o dizziness.  Onset 2 days ago.  Symptoms have been constant.  Nothing makes this worse or better.  She reports having associated nausea and vomiting.  Had emesis 2 days ago as well as yesterday.  She had no emesis today.  She has been nauseated, however.  This comes in waves.  She is currently not nauseated.  She denies having any chest pain, shortness of breath, mica pain, diarrhea, urinary symptoms.  No tinnitus.      PAST MEDICAL HISTORY  Active Ambulatory Problems     Diagnosis Date Noted   • Hyperlipidemia LDL goal <130 11/08/2016   • SI joint arthritis 11/08/2016   • Arthritis 09/25/2018   • Encounter for long-term (current) drug use 09/25/2018   • H/O hysterectomy for benign disease 10/01/2012   • Menopause 10/01/2012     Resolved Ambulatory Problems     Diagnosis Date Noted   • Abnormal skin growth 07/17/2017     Past Medical History:   Diagnosis Date   • Cancer (HCC) 2010   • History of bone density study 2008   • History of mammogram 11/01/2013   • History of mammogram 09/23/2011   • Hyperlipidemia    • Hypertension          PAST SURGICAL HISTORY  Past Surgical History:   Procedure Laterality Date   • COLONOSCOPY  2006   • HYSTERECTOMY     • SKIN SURGERY  2000    L FA - melanoma, Dr Caban          FAMILY HISTORY  Family History   Problem Relation Age of Onset   • Heart disease Mother    • Heart disease Father         Lived until he was 102 yoa    • Cancer Sister    • Stroke Sister    • Breast cancer Sister    • No Known Problems Brother    • No Known Problems Daughter    • No Known Problems Son    • No Known Problems Maternal Grandmother    • No Known Problems Paternal  Grandmother    • No Known Problems Maternal Aunt    • No Known Problems Paternal Aunt    • BRCA 1/2 Neg Hx    • Colon cancer Neg Hx    • Endometrial cancer Neg Hx    • Ovarian cancer Neg Hx          SOCIAL HISTORY  Social History     Socioeconomic History   • Marital status:    Tobacco Use   • Smoking status: Never Smoker   • Smokeless tobacco: Never Used   Substance and Sexual Activity   • Alcohol use: No   • Drug use: No         ALLERGIES  Bactrim [sulfamethoxazole-trimethoprim]        REVIEW OF SYSTEMS  Review of Systems     All systems reviewed and negative except for those discussed in HPI.       PHYSICAL EXAM    I have reviewed the triage vital signs and nursing notes.    ED Triage Vitals   Temp Heart Rate Resp BP SpO2   03/13/22 1031 03/13/22 1031 03/13/22 1031 03/13/22 1043 03/13/22 1031   98.1 °F (36.7 °C) 92 16 170/95 97 %      Temp src Heart Rate Source Patient Position BP Location FiO2 (%)   03/13/22 1031 -- -- -- --   Tympanic           Physical Exam  GENERAL: not distressed  HENT: nares patent  EYES: no scleral icterus  CV: regular rhythm, regular rate  RESPIRATORY: normal effort, clear to auscultation bilaterally  ABDOMEN: soft  MUSCULOSKELETAL: no deformity  NEURO:   Recent and remote memory functions are normal. The patient is attentive with normal concentration. Language is fluent. Speech is clear. The speech is non-dysarthric. Fund of knowledge is normal.   Symmetric smile with no facial droop.  Eyes close shut strongly bilaterally.  Symmetric eyebrow raise bilaterally.  EOMI, PERRL  Rightward gaze nystagmus  CN II-XII grossly normal otherwise.  5/5 strength to extremities.  No pronator drift.  Intact FNF.  No meningismus.  Patient is unable to ambulate independently, even with her cane.  She is rather unsteady.  SKIN: warm, dry        LAB RESULTS  Recent Results (from the past 24 hour(s))   Comprehensive Metabolic Panel    Collection Time: 03/13/22 10:54 AM    Specimen: Blood   Result  Value Ref Range    Glucose 120 (H) 65 - 99 mg/dL    BUN 19 8 - 23 mg/dL    Creatinine 0.76 0.57 - 1.00 mg/dL    Sodium 139 136 - 145 mmol/L    Potassium 3.8 3.5 - 5.2 mmol/L    Chloride 102 98 - 107 mmol/L    CO2 25.0 22.0 - 29.0 mmol/L    Calcium 9.4 8.6 - 10.5 mg/dL    Total Protein 6.9 6.0 - 8.5 g/dL    Albumin 4.00 3.50 - 5.20 g/dL    ALT (SGPT) 18 1 - 33 U/L    AST (SGOT) 27 1 - 32 U/L    Alkaline Phosphatase 105 39 - 117 U/L    Total Bilirubin 0.4 0.0 - 1.2 mg/dL    Globulin 2.9 gm/dL    A/G Ratio 1.4 g/dL    BUN/Creatinine Ratio 25.0 7.0 - 25.0    Anion Gap 12.0 5.0 - 15.0 mmol/L    eGFR 76.9 >60.0 mL/min/1.73   CBC Auto Differential    Collection Time: 03/13/22 10:54 AM    Specimen: Blood   Result Value Ref Range    WBC 10.33 3.40 - 10.80 10*3/mm3    RBC 4.38 3.77 - 5.28 10*6/mm3    Hemoglobin 13.5 12.0 - 15.9 g/dL    Hematocrit 39.7 34.0 - 46.6 %    MCV 90.6 79.0 - 97.0 fL    MCH 30.8 26.6 - 33.0 pg    MCHC 34.0 31.5 - 35.7 g/dL    RDW 12.4 12.3 - 15.4 %    RDW-SD 40.1 37.0 - 54.0 fl    MPV 8.9 6.0 - 12.0 fL    Platelets 412 140 - 450 10*3/mm3    Neutrophil % 85.0 (H) 42.7 - 76.0 %    Lymphocyte % 8.0 (L) 19.6 - 45.3 %    Monocyte % 4.9 (L) 5.0 - 12.0 %    Eosinophil % 1.1 0.3 - 6.2 %    Basophil % 0.5 0.0 - 1.5 %    Immature Grans % 0.5 0.0 - 0.5 %    Neutrophils, Absolute 8.78 (H) 1.70 - 7.00 10*3/mm3    Lymphocytes, Absolute 0.83 0.70 - 3.10 10*3/mm3    Monocytes, Absolute 0.51 0.10 - 0.90 10*3/mm3    Eosinophils, Absolute 0.11 0.00 - 0.40 10*3/mm3    Basophils, Absolute 0.05 0.00 - 0.20 10*3/mm3    Immature Grans, Absolute 0.05 0.00 - 0.05 10*3/mm3    nRBC 0.0 0.0 - 0.2 /100 WBC       Ordered the above labs and independently reviewed the results.        RADIOLOGY  CT Head Without Contrast    Result Date: 3/13/2022  CT HEAD  HISTORY:Dizziness for 2 days  TECHNIQUE: CT scan of the head was obtained with 3 mm axial images without intravenous contrast.  Radiation dose reduction techniques were utilized,  including automated exposure control and exposure modulation based on body size.  COMPARISON:None  FINDINGS: There is no finding of acute infarct, hemorrhage, contusion or abnormal extra-axial collection. No hydrocephalus is present.      1.  No findings of acute intracranial abnormality.   This report was finalized on 3/13/2022 11:41 AM by Dr. Gibran Masters M.D.        I ordered the above noted radiological studies. Reviewed by me and discussed with radiologist.  See dictation for official radiology interpretation.      PROCEDURES    Procedures      MEDICATIONS GIVEN IN ER    Medications   sodium chloride 0.9 % flush 10 mL (has no administration in time range)   lactated ringers bolus 1,000 mL (1,000 mL Intravenous New Bag 3/13/22 1222)   ondansetron (ZOFRAN) injection 4 mg (has no administration in time range)         PROGRESS, DATA ANALYSIS, CONSULTS, AND MEDICAL DECISION MAKING    All labs have been independently reviewed by me.  All radiology studies have been reviewed by me and discussed with radiologist dictating the report.   EKG's independently viewed and interpreted by me.  Discussion below represents my analysis of pertinent findings related to patient's condition, differential diagnosis, treatment plan and final disposition.    Differential diagnosis includes stroke, BPPV, Ménière's, orthostatic hypotension.    Basic blood work is reassuring.  CT head is acutely negative.    ED Course as of 03/13/22 1225   Sun Mar 13, 2022   1146 Creatinine: 0.76 [TD]   1146 Sodium: 139 [TD]   1146 Potassium: 3.8 [TD]   1146 WBC: 10.33 [TD]   1146 Hemoglobin: 13.5 [TD]      ED Course User Index  [TD] Jorge Garner II, MD       I discussed the case with LALITHA Billings for observation medicine.  We reviewed the patient's labs and imaging as well as history.  She will admit for Dr. Arnold.  I believe she will benefit from neurology consultation given that she has constant dizziness.    PPE: The patient wore a  surgical mask throughout the entire patient encounter. I wore an N95.    AS OF 12:25 EDT VITALS:    BP - 170/95  HR - 92  TEMP - 98.1 °F (36.7 °C) (Tympanic)  O2 SATS - 97%        DIAGNOSIS  Final diagnoses:   Dizziness   Unable to ambulate   Nausea         DISPOSITION  Admit           Jorge Garner II, MD  03/13/22 1634

## 2022-03-13 NOTE — NURSING NOTE
Patient admitted to observation unit for continuation of care r/t nausea, vomiting, and generalize weakness since Friday. Patient is alert and oriented x 4 and able to verbalize needs, neurology consulted and MRI ordered, patient has surgical wounds to bilateral lower extremities, dressing intact and wound care consulted, continues to c/o dizziness meclizine administered with minimal improvement. Patient is now resting with daughter at bedside.

## 2022-03-13 NOTE — CONSULTS
Neurology Consult Note    Consult Date: 3/13/2022    Referring MD: Eddie Arnold MD    Reason for Consult I have been asked to see the patient in neurological consultation to render advice and opinion regarding vertigo    Shanique Martinez is a 86 y.o. female with previous melanoma, prior episode of benign positional vertigo.  She reports abrupt onset on Friday while sitting in a chair of constant clockwise vertigo.  This improved slightly today but did not resolve.  She has had significant associated nausea.  She denies any double vision or unilateral weakness or numbness.  She has found it somewhat difficult to walk with the vertigo but she has been ambulatory today.  She was admitted for neurologic evaluation.  She did have fairly severe hypertension in the emergency department.    Past Medical/Surgical Hx:  Past Medical History:   Diagnosis Date   • Abnormal skin growth 7/17/2017   • Arthritis    • Cancer (HCC) 2010    Melanoma: L FA    • History of bone density study 2008   • History of mammogram 11/01/2013   • History of mammogram 09/23/2011   • Hyperlipidemia    • Hypertension     HBP with hypertension unspecified     Past Surgical History:   Procedure Laterality Date   • COLONOSCOPY  2006   • HYSTERECTOMY     • SKIN SURGERY  2000    L FA - melanoma, Dr Caban        Medications On Admission  Medications Prior to Admission   Medication Sig Dispense Refill Last Dose   • atorvastatin (LIPITOR) 10 MG tablet TAKE ONE TABLET BY MOUTH DAILY 90 tablet 4 Past Week at Unknown time   • CALCIUM PO Take 1 tablet by mouth Daily.   Past Week at Unknown time   • cyclobenzaprine (FLEXERIL) 10 MG tablet TAKE ONE TABLET BY MOUTH THREE TIMES A  tablet 3 3/12/2022 at Unknown time   • naproxen sodium (ANAPROX) 550 MG tablet TAKE ONE TABLET BY MOUTH TWICE A DAY WITH MEALS 60 tablet 4 Past Week at Unknown time   • prednisoLONE acetate (PRED FORTE) 1 % ophthalmic suspension    Past Week at Unknown time   • doxycycline  "(VIBRAMYCIN) 100 MG capsule Take 100 mg by mouth 2 (Two) Times a Day.          Allergies:  Allergies   Allergen Reactions   • Bactrim [Sulfamethoxazole-Trimethoprim] Rash       Social Hx:  Social History     Socioeconomic History   • Marital status:    Tobacco Use   • Smoking status: Never Smoker   • Smokeless tobacco: Never Used   Substance and Sexual Activity   • Alcohol use: No   • Drug use: No       Family Hx:  Family History   Problem Relation Age of Onset   • Heart disease Mother    • Heart disease Father         Lived until he was 102 yoa    • Cancer Sister    • Stroke Sister    • Breast cancer Sister    • No Known Problems Brother    • No Known Problems Daughter    • No Known Problems Son    • No Known Problems Maternal Grandmother    • No Known Problems Paternal Grandmother    • No Known Problems Maternal Aunt    • No Known Problems Paternal Aunt    • BRCA 1/2 Neg Hx    • Colon cancer Neg Hx    • Endometrial cancer Neg Hx    • Ovarian cancer Neg Hx        Review of systems  Constitutional: [No fevers, chills]  Eye: [No vision loss, diplopia]  HEENT: [no hearing loss, + vertigo]  Cardiovascular: [No Chest pain, palpitations]  Neurologic: [No weakness, numbness]  Psychiatric: [No anxiety, depression]    All other systems reviewed and are negative    Exam    /96 (BP Location: Left arm, Patient Position: Lying)   Pulse 94   Temp 97.7 °F (36.5 °C) (Oral)   Resp 18   Ht 162.6 cm (64\")   Wt 90.7 kg (200 lb)   SpO2 100%   BMI 34.33 kg/m²   gen: NAD, vitals reviewed  Eyes: fundus sharp with no papilledema or retinal hemorrhages  HEENT: no nuchal rigidity  CVS: RRR, S1, S2  MS: oriented x3, recent/remote memory intact, normal attention/concentration, language intact, no neglect, normal fund of knowledge  CN: visual acuity grossly normal, visual fields full, PERRL, right eye ptosis, EOMI with left-sided nystagmus, worse when looking to the right, positive head impulse test to the right, positive " [place]-Hallpike to the right, positive test of skew, facial sensation equal, no facial droop, hearing symmetric, palate elevates symmetrically, shoulder shrug equal, tongue midline  Motor: 5/5 throughout upper and lower extremities, normal tone  Sensation: intact to vibration and temperature throughout  Reflexes: 2+ throughout upper and lower extremities, downgoing plantars  Coordination: no dysmetria with finger to nose bilaterally  Gait: Unsteady station, no ataxia    DATA:    Lab Results   Component Value Date    GLUCOSE 120 (H) 03/13/2022    CALCIUM 9.4 03/13/2022     03/13/2022    K 3.8 03/13/2022    CO2 25.0 03/13/2022     03/13/2022    BUN 19 03/13/2022    CREATININE 0.76 03/13/2022    EGFRIFAFRI 68 02/09/2022    EGFRIFNONA 59 (L) 02/09/2022    BCR 25.0 03/13/2022    ANIONGAP 12.0 03/13/2022     Lab Results   Component Value Date    WBC 10.33 03/13/2022    HGB 13.5 03/13/2022    HCT 39.7 03/13/2022    MCV 90.6 03/13/2022     03/13/2022     Lab Results   Component Value Date     (H) 02/09/2022    LDL 78 06/08/2021     (H) 06/30/2020     Lab Results   Component Value Date    HGBA1C 5.50 09/17/2018     No results found for: INR, PROTIME    Lab review: Glucose 120, CBC normal.  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  She did have a positive [place]-Hallpike and I performed Epley maneuver but with no improvement in her symptoms.  She had some red flags on exam including right-sided ptosis and a positive test of skew.  These findings necessitate CTA for possible Frankie syndrome and MRI to exclude a small pontine stroke.    PLAN:  Meclizine as needed for vertigo  MRI brain with and without contrast IAC protocol  CTA head and neck  If above workup negative she would likely benefit from  vestibular PT in the outpatient setting    Recommendations discussed with LALITHA Ross. Likely discharge tomorrow AM if vertigo improves and stroke imaging negative

## 2022-03-13 NOTE — ED TRIAGE NOTES
Pt reports NV, dizziness and generalized weakness that started on Friday.     Pt was wearing a mask during assessment.  This RN wore appropriate PPE

## 2022-03-13 NOTE — H&P
[facility]   HISTORY AND PHYSICAL    Patient Name: Shanique Martinez  : 1935  MRN: [id number]  Primary Care Physician:  Alvarez Ron III, NP-C  Date of admission: 3/13/2022    Subjective   Subjective     Chief Complaint:   Chief Complaint   Patient presents with   • Nausea   • Dizziness   • Vomiting         HPI:    Shanique Martinez is a 86 y.o. female with a history of prior episode of benign proximal positional vertigo and melanoma who presents to [facility] ER with dizziness for 3 days.  Patient states that she was sitting in her recliner on Friday, 3/11/2022 when she started having abrupt onset of dizziness.  She states that this is not similar to her previous episode of vertigo.  Reports associated blurry vision, nausea and a couple of episodes of nonbloody vomiting.  She denies any double vision.  Denies numbness and tingling.  She states that she did have some difficulty walking but denies any focal weakness.  She denies headache.  Denies chest pain and palpitations.  Denies shortness of breath and cough.  She states that she has not been able to take very much oral intake over the past 2 days since the beginning of her symptoms.  She states that she has been able to keep down her medications.    ER evaluation significant for CT head revealing no findings of acute intracranial abnormality.  Laboratory work-up was fairly unremarkable.  Patient was given Zofran and 1 L of IV fluids in the ER.  Patient admitted to the observation unit for further evaluation.    Review of Systems   All systems were reviewed and negative except for: What is mentioned above in the HPI.    Personal History     Past Medical History:   Diagnosis Date   • Abnormal skin growth 2017   • Arthritis    • Cancer (HCC)     Melanoma: L FA    • History of bone density study    • History of mammogram 2013   • History of mammogram 2011   • Hyperlipidemia        Past Surgical  History:   Procedure Laterality Date   • COLONOSCOPY  2006   • HYSTERECTOMY     • SKIN SURGERY  2000    L FA - melanoma, Dr Caban        Family History: family history includes Breast cancer in her sister; Cancer in her sister; Heart disease in her father and mother; No Known Problems in her brother, daughter, maternal aunt, maternal grandmother, paternal aunt, paternal grandmother, and son; Stroke in her sister. Otherwise pertinent FHx was reviewed and not pertinent to current issue.    Social History:  reports that she has never smoked. She has never used smokeless tobacco. She reports that she does not drink alcohol and does not use drugs.    Home Medications:  Calcium, atorvastatin, cyclobenzaprine, doxycycline, naproxen sodium, and prednisoLONE acetate    Allergies:  Allergies   Allergen Reactions   • Bactrim [Sulfamethoxazole-Trimethoprim] Rash       Objective   Objective     Vitals:   Temp:  [97.7 °F (36.5 °C)-98.1 °F (36.7 °C)] 97.7 °F (36.5 °C)  Heart Rate:  [89-94] 94  Resp:  [16-18] 18  BP: (170-188)/() 171/96  Physical Exam    Constitutional: 86-year-old female, well-nourished, in no acute distress on room air.   Eyes: PERRLA, sclerae anicteric, no conjunctival injection, positive horizontal nystagmus   HENT: NCAT, mucous membranes moist   Neck: Supple, no thyromegaly, no lymphadenopathy, trachea midline   Respiratory: Clear to auscultation bilaterally, nonlabored respirations    Cardiovascular: RRR, no murmurs, rubs, or gallops, palpable pedal pulses bilaterally   Gastrointestinal: Positive bowel sounds, soft, nontender, nondistended   Musculoskeletal: Bilateral lower extremities noted with clean wound and compression dressings, normal capillary refill.  2+ DP and PT pulses bilaterally.   Psychiatric: Appropriate affect, cooperative   Neurologic: Oriented x 3, strength symmetric in all extremities, Cranial Nerves grossly intact to confrontation, speech clear, sensation intact   Skin: No rashes      Result Review    Result Review:  I have personally reviewed the results from the time of this admission to 3/13/2022 15:08 EDT and agree with these findings:  [x]  Laboratory  []  Microbiology  [x]  Radiology  []  EKG/Telemetry   []  Cardiology/Vascular   []  Pathology  []  Old records  []  Other:  Most notable findings include:    CT Head Without Contrast    Result Date: 3/13/2022  CT HEAD  HISTORY:Dizziness for 2 days  TECHNIQUE: CT scan of the head was obtained with 3 mm axial images without intravenous contrast.  Radiation dose reduction techniques were utilized, including automated exposure control and exposure modulation based on body size.  COMPARISON:None  FINDINGS: There is no finding of acute infarct, hemorrhage, contusion or abnormal extra-axial collection. No hydrocephalus is present.      1.  No findings of acute intracranial abnormality.   This report was finalized on 3/13/2022 11:41 AM by Dr. Gibran Masters M.D.      Assessment/Plan   Assessment / Plan     Brief Patient Summary:  Shanique Martinez is a 86 y.o. female who being admitted to the observation unit for further evaluation of dizziness for 3 days with plan for neurology consultation and MRI.    Active Hospital Problems:  Active Hospital Problems    Diagnosis    • Dizziness      Plan:     Dizziness  -Neurology consulted, Dr. Sauceda saw and evaluated patient -MRI brain with and without contrast ice ACE protocol ordered  -CTA head and neck ordered  -Meclizine as needed for vertigo  -Fall precautions  -Neuro checks every 4 hours    High blood pressure  -BP on admission 171/96, patient does not take blood pressure medications at home  -Continue to monitor with vital signs  -Will await MRI results prior to treatment.    Chronic bilateral lower extremity wounds  -Patient reports she follows outpatient with wound care stating that she just saw them Thursday of last week.  She states she just finished a course of doxycycline.  She states that they  wrap her legs weekly and have advised her to not remove the dressings while at home.  -Wound care is not available on the weekends at this time.  Place a consult for them to see her tomorrow morning.  -Patient afebrile with normal white blood cell count, no concern for acute infection        DVT prophylaxis:  Mechanical DVT prophylaxis orders are present.    CODE STATUS:    Code Status (Patient has no pulse and is not breathing): CPR (Attempt to Resuscitate)  Medical Interventions (Patient has pulse or is breathing): Full Support    Admission Status:  I believe this patient meets observation status.    I wore an face mask, eye protection, and gloves during this patient encounter. Patient also wearing a surgical mask. Hand hygeine performed before and after seeing the patient.    Electronically signed by Jyotsna Elder PA-C, 03/13/22, 3:08 PM EDT.

## 2022-03-14 ENCOUNTER — APPOINTMENT (OUTPATIENT)
Dept: MRI IMAGING | Facility: HOSPITAL | Age: 87
End: 2022-03-14

## 2022-03-14 ENCOUNTER — APPOINTMENT (OUTPATIENT)
Dept: CT IMAGING | Facility: HOSPITAL | Age: 87
End: 2022-03-14

## 2022-03-14 LAB
ANION GAP SERPL CALCULATED.3IONS-SCNC: 11.7 MMOL/L (ref 5–15)
BUN SERPL-MCNC: 16 MG/DL (ref 8–23)
BUN/CREAT SERPL: 23.2 (ref 7–25)
CALCIUM SPEC-SCNC: 9.6 MG/DL (ref 8.6–10.5)
CHLORIDE SERPL-SCNC: 102 MMOL/L (ref 98–107)
CO2 SERPL-SCNC: 24.3 MMOL/L (ref 22–29)
CREAT SERPL-MCNC: 0.69 MG/DL (ref 0.57–1)
DEPRECATED RDW RBC AUTO: 39.6 FL (ref 37–54)
EGFRCR SERPLBLD CKD-EPI 2021: 85.2 ML/MIN/1.73
ERYTHROCYTE [DISTWIDTH] IN BLOOD BY AUTOMATED COUNT: 12.1 % (ref 12.3–15.4)
GLUCOSE SERPL-MCNC: 87 MG/DL (ref 65–99)
HCT VFR BLD AUTO: 38.5 % (ref 34–46.6)
HGB BLD-MCNC: 13.2 G/DL (ref 12–15.9)
MAGNESIUM SERPL-MCNC: 2 MG/DL (ref 1.6–2.4)
MCH RBC QN AUTO: 30.6 PG (ref 26.6–33)
MCHC RBC AUTO-ENTMCNC: 34.3 G/DL (ref 31.5–35.7)
MCV RBC AUTO: 89.1 FL (ref 79–97)
PLATELET # BLD AUTO: 434 10*3/MM3 (ref 140–450)
PMV BLD AUTO: 8.7 FL (ref 6–12)
POTASSIUM SERPL-SCNC: 3.8 MMOL/L (ref 3.5–5.2)
POTASSIUM SERPL-SCNC: 3.9 MMOL/L (ref 3.5–5.2)
QT INTERVAL: 450 MS
RBC # BLD AUTO: 4.32 10*6/MM3 (ref 3.77–5.28)
SODIUM SERPL-SCNC: 138 MMOL/L (ref 136–145)
TROPONIN T SERPL-MCNC: <0.01 NG/ML (ref 0–0.03)
TROPONIN T SERPL-MCNC: <0.01 NG/ML (ref 0–0.03)
WBC NRBC COR # BLD: 8.63 10*3/MM3 (ref 3.4–10.8)

## 2022-03-14 PROCEDURE — 83735 ASSAY OF MAGNESIUM: CPT | Performed by: NURSE PRACTITIONER

## 2022-03-14 PROCEDURE — 93005 ELECTROCARDIOGRAM TRACING: CPT | Performed by: NURSE PRACTITIONER

## 2022-03-14 PROCEDURE — 0 IOPAMIDOL PER 1 ML: Performed by: EMERGENCY MEDICINE

## 2022-03-14 PROCEDURE — 97530 THERAPEUTIC ACTIVITIES: CPT

## 2022-03-14 PROCEDURE — 84132 ASSAY OF SERUM POTASSIUM: CPT | Performed by: NURSE PRACTITIONER

## 2022-03-14 PROCEDURE — G0378 HOSPITAL OBSERVATION PER HR: HCPCS

## 2022-03-14 PROCEDURE — A9577 INJ MULTIHANCE: HCPCS | Performed by: EMERGENCY MEDICINE

## 2022-03-14 PROCEDURE — 80048 BASIC METABOLIC PNL TOTAL CA: CPT | Performed by: STUDENT IN AN ORGANIZED HEALTH CARE EDUCATION/TRAINING PROGRAM

## 2022-03-14 PROCEDURE — 99213 OFFICE O/P EST LOW 20 MIN: CPT | Performed by: NURSE PRACTITIONER

## 2022-03-14 PROCEDURE — 93010 ELECTROCARDIOGRAM REPORT: CPT | Performed by: INTERNAL MEDICINE

## 2022-03-14 PROCEDURE — 70553 MRI BRAIN STEM W/O & W/DYE: CPT

## 2022-03-14 PROCEDURE — 70496 CT ANGIOGRAPHY HEAD: CPT

## 2022-03-14 PROCEDURE — 85027 COMPLETE CBC AUTOMATED: CPT | Performed by: STUDENT IN AN ORGANIZED HEALTH CARE EDUCATION/TRAINING PROGRAM

## 2022-03-14 PROCEDURE — 84484 ASSAY OF TROPONIN QUANT: CPT | Performed by: NURSE PRACTITIONER

## 2022-03-14 PROCEDURE — 97162 PT EVAL MOD COMPLEX 30 MIN: CPT

## 2022-03-14 PROCEDURE — 70498 CT ANGIOGRAPHY NECK: CPT

## 2022-03-14 PROCEDURE — 0 GADOBENATE DIMEGLUMINE 529 MG/ML SOLUTION: Performed by: EMERGENCY MEDICINE

## 2022-03-14 RX ORDER — SCOLOPAMINE TRANSDERMAL SYSTEM 1 MG/1
1 PATCH, EXTENDED RELEASE TRANSDERMAL
Status: DISCONTINUED | OUTPATIENT
Start: 2022-03-14 | End: 2022-03-15 | Stop reason: HOSPADM

## 2022-03-14 RX ADMIN — SCOPALAMINE 1 PATCH: 1 PATCH, EXTENDED RELEASE TRANSDERMAL at 11:13

## 2022-03-14 RX ADMIN — Medication 10 ML: at 08:12

## 2022-03-14 RX ADMIN — IOPAMIDOL 95 ML: 755 INJECTION, SOLUTION INTRAVENOUS at 08:32

## 2022-03-14 RX ADMIN — CYCLOBENZAPRINE 10 MG: 10 TABLET, FILM COATED ORAL at 08:11

## 2022-03-14 RX ADMIN — Medication 10 ML: at 22:57

## 2022-03-14 RX ADMIN — CYCLOBENZAPRINE 10 MG: 10 TABLET, FILM COATED ORAL at 23:17

## 2022-03-14 RX ADMIN — ACETAMINOPHEN 650 MG: 325 TABLET ORAL at 07:45

## 2022-03-14 RX ADMIN — ATORVASTATIN CALCIUM 10 MG: 10 TABLET, FILM COATED ORAL at 08:11

## 2022-03-14 RX ADMIN — CYCLOBENZAPRINE 10 MG: 10 TABLET, FILM COATED ORAL at 17:17

## 2022-03-14 RX ADMIN — GADOBENATE DIMEGLUMINE 18 ML: 529 INJECTION, SOLUTION INTRAVENOUS at 09:17

## 2022-03-14 NOTE — PLAN OF CARE
Goal Outcome Evaluation:  Plan of Care Reviewed With: patient        Progress: no change  Outcome Evaluation: Patient alert and oriented. Patient verbalized that the dizziness is better. Patient denies pain. BP slightly elevated. On room air. No s/s of acute distress. Will continue to monitor.

## 2022-03-14 NOTE — PLAN OF CARE
Goal Outcome Evaluation:   Plan of care reviewed with patient   Outcome summary: Pt admitted to observation unit for dizziness, neurology consulted and performed epley maneuver with no resolve of symptoms. Routine MRI and CTA ordered by neurology to be performed this AM and follow up by neurology. Pt able to ambulate with cane at baseline, AOX4, vitals stable.

## 2022-03-14 NOTE — DISCHARGE INSTRUCTIONS
Return to the emergency department with worsening symptoms, uncontrolled pain, inability to tolerate oral liquids, fever greater than 105°F not controlled by Tylenol and ibuprofen or as needed with emergent concerns.

## 2022-03-14 NOTE — PROGRESS NOTES
"DOS: 3/14/2022  NAME: Shanique Martinez   : 1935  PCP: Alvarez Ron III, MAVERICKC  Chief Complaint   Patient presents with   • Nausea   • Dizziness   • Vomiting     Stroke    Subjective: Patient continues to have vertigo, double vision at times, and associated nausea. No focal weakness or numbness. Daughter is at the bedside. Pt seen in follow up today, however the problem is new to the examiner.      Objective:  Vital signs: /84 (BP Location: Right arm, Patient Position: Sitting)   Pulse 89   Temp 97.9 °F (36.6 °C) (Oral)   Resp 17   Ht 162.6 cm (64\")   Wt 87.9 kg (193 lb 11.2 oz)   SpO2 97%   BMI 33.25 kg/m²       General appearance: Well developed, well nourished, well groomed, alert and cooperative.   HEENT: Normocephalic.   Cardiac: Regular rate and rhythm.  Peripheral Vasculature: Radial pulses are equal and symmetric.  Chest Exam: Clear to auscultation bilaterally, no wheezes, no rhonchi.  Extremities:+ BLE edema.   Skin: No rashes or birthmarks.     Higher integrative function: Oriented to time, place, person, intact recent and remote memory, attention span, concentration and language. Spontaneous speech, fund of vocabulary are normal. Normal visual fields.   Cranial nerves: Extraocular movements are full with nystagmus in R horizontal gaze.  Right pupil 3 mm, left pupil 2 mm, both round and reactive.  Right ptosis, normal facial sensation, face symmetric, symmetric shoulder shrug, tongue midline.   Motor: Normal muscle strength, bulk, and tone in upper and lower extremities. No fasciculations, rigidity, spasticity or abnormal movements.   Sensation: Intact/symmetric to light touch in all extremities.  Station and gait: Deferred.  Coordination: Finger to nose test showed no dysmetria.     Scheduled Meds:atorvastatin, 10 mg, Oral, Daily  cyclobenzaprine, 10 mg, Oral, TID  sodium chloride, 10 mL, Intravenous, Q12H      Continuous Infusions:   PRN Meds:.•  acetaminophen  •  " meclizine  •  melatonin  •  nitroglycerin  •  ondansetron **OR** ondansetron  •  [COMPLETED] Insert peripheral IV **AND** sodium chloride  •  sodium chloride    Laboratory results:  Lab Results   Component Value Date    GLUCOSE 87 03/14/2022    CALCIUM 9.6 03/14/2022     03/14/2022    K 3.9 03/14/2022    CO2 24.3 03/14/2022     03/14/2022    BUN 16 03/14/2022    CREATININE 0.69 03/14/2022    EGFRIFAFRI 68 02/09/2022    EGFRIFNONA 59 (L) 02/09/2022    BCR 23.2 03/14/2022    ANIONGAP 11.7 03/14/2022     Lab Results   Component Value Date    WBC 8.63 03/14/2022    HGB 13.2 03/14/2022    HCT 38.5 03/14/2022    MCV 89.1 03/14/2022     03/14/2022     Lab Results   Component Value Date    CHOL 175 05/03/2017     Lab Results   Component Value Date    HDL 74 02/09/2022    HDL 74 (H) 06/08/2021    HDL 72 06/30/2020     Lab Results   Component Value Date     (H) 02/09/2022    LDL 78 06/08/2021     (H) 06/30/2020     Lab Results   Component Value Date    TRIG 65 02/09/2022    TRIG 77 06/08/2021    TRIG 121 06/30/2020          Review and interpretation of imaging: MRI brain images viewed by me, no acute findings seen.  CT Head Without Contrast    Result Date: 3/13/2022  CT HEAD  HISTORY:Dizziness for 2 days  TECHNIQUE: CT scan of the head was obtained with 3 mm axial images without intravenous contrast.  Radiation dose reduction techniques were utilized, including automated exposure control and exposure modulation based on body size.  COMPARISON:None  FINDINGS: There is no finding of acute infarct, hemorrhage, contusion or abnormal extra-axial collection. No hydrocephalus is present.      1.  No findings of acute intracranial abnormality.   This report was finalized on 3/13/2022 11:41 AM by Dr. Gibran Masters M.D.      MRI Internal Auditory Canal With Wo    Result Date: 3/14/2022  STAT MRI OF THE INTERNAL AUDITORY CANALS WITH AND WITHOUT CONTRAST 03/14/2022  CLINICAL HISTORY: Dizziness, nausea,  history of melanoma normal in the arm.  TECHNIQUE: Axial T1, FLAIR, fat-suppressed T2, axial diffusion, sagittal T1 and postcontrast axial fat-suppressed T1 sagittal and coronal T1 and thin cut 1.5 mm thick axial postcontrast spoiled gradient echo T1 weighted images were obtained of the entire head. In addition thin cut axial T2 and pre and postcontrast axial and coronal T1-weighted images were obtained through the internal auditory canals.  FINDINGS: There is linear focus of enhancement in the posterior inferior medial right parietal lobe felt to be a venous angioma of benign developmental venous anomaly of no significance. Otherwise, the brain parenchyma is normal in signal intensity. The ventricles are normal in size. I see no focal mass effect. There is no midline shift. No extra-axial fluid collections are identified and no additional abnormal foci of enhancement are seen in the head. The paranasal sinuses and the mastoid air cells and middle ear cavities are clear. Good flow voids are demonstrated within the cerebral vessels and in the dural venous sinuses. On the thin cut images through the internal auditory canals no abnormal enhancement is seen in the cerebellopontine angles or within the internal auditory canals or within the substance of the temporal bones.      No acute intracranial abnormality seen with no acute infarct identified. There is tiny venous angioma in the posterior inferior medial right parietal lobe which is a benign developmental venous anomaly of no significance otherwise the MRI of the brain is normal and the etiology of the patient's dizziness is not established on this exam.      CT Angiogram Carotids    Result Date: 3/14/2022  CT ANGIOGRAM NECK AND HEAD WITH CONTRAST  HISTORY: Dizziness, lightheaded, nausea, vomiting.  COMPARISON: No prior CT angiogram of the neck and head is available for comparison.  FINDINGS: Initially, a noncontrasted CT examination of the brain was performed. The  brain ventricles are symmetrical. There is no evidence of hemorrhage, hydrocephalus or of abnormal extra-axial fluid. No focal area of decreased attenuation to suggest acute infarction was identified.  A CT angiogram of the neck and head was then performed. Multiplanar as well as 3-dimensional reconstructions were generated.  FINDINGS: The great vessels are arranged in a classic configuration. There is 0% stenosis involving the carotid bifurcations using NASCET criteria. The proximal aspects of the anterior and middle cerebral arteries appear unremarkable.  Both vertebral arteries were [ ]. The left vertebral artery is larger than that of the right. The basilar artery and left posterior cerebral artery appear unremarkable. There is a fetal origin of the right posterior cerebral artery. The posterior inferior cerebellar artery on the right was not opacified. There is a prominent posterior inferior cerebellar artery on the left and an anterior inferior cerebellar artery/posterior inferior cerebellar artery complex on the right. After contrast administration there was no evidence of abnormal enhancement.  Sagittal reconstructions demonstrate moderate loss of disc height at C3-4, C4-5, C5-6 and C6-7.      No evidence of acute infarction, intracranial hemorrhage or of abnormal enhancement. There is 0% stenosis involving the carotid bifurcations. There was no evidence of a proximal intracranial high-grade stenosis or aneurysm.    Radiation dose reduction techniques were utilized, including automated exposure control and exposure modulation based on body size.       CT Angiogram Head    Result Date: 3/14/2022  CT ANGIOGRAM NECK AND HEAD WITH CONTRAST  HISTORY: Dizziness, lightheaded, nausea, vomiting.  COMPARISON: No prior CT angiogram of the neck and head is available for comparison.  FINDINGS: Initially, a noncontrasted CT examination of the brain was performed. The brain ventricles are symmetrical. There is no evidence  of hemorrhage, hydrocephalus or of abnormal extra-axial fluid. No focal area of decreased attenuation to suggest acute infarction was identified.  A CT angiogram of the neck and head was then performed. Multiplanar as well as 3-dimensional reconstructions were generated.  FINDINGS: The great vessels are arranged in a classic configuration. There is 0% stenosis involving the carotid bifurcations using NASCET criteria. The proximal aspects of the anterior and middle cerebral arteries appear unremarkable.  Both vertebral arteries were [ ]. The left vertebral artery is larger than that of the right. The basilar artery and left posterior cerebral artery appear unremarkable. There is a fetal origin of the right posterior cerebral artery. The posterior inferior cerebellar artery on the right was not opacified. There is a prominent posterior inferior cerebellar artery on the left and an anterior inferior cerebellar artery/posterior inferior cerebellar artery complex on the right. After contrast administration there was no evidence of abnormal enhancement.  Sagittal reconstructions demonstrate moderate loss of disc height at C3-4, C4-5, C5-6 and C6-7.      No evidence of acute infarction, intracranial hemorrhage or of abnormal enhancement. There is 0% stenosis involving the carotid bifurcations. There was no evidence of a proximal intracranial high-grade stenosis or aneurysm.    Radiation dose reduction techniques were utilized, including automated exposure control and exposure modulation based on body size.         Impression:  86-year-old female with HTN, HLD, previous melanoma and previous episode of BPPV who presented 3/13 with abrupt onset of clockwise vertigo which started on Friday and has persisted.  BP up to 188/100 on admission.  There is no associated change in speech or unilateral weakness or numbness.  On exam she had right lid ptosis concerning for possible Frankie's.  At baseline she lives alone and uses a cane  intermittently.    Work-up:  MRI brain/IACs with and without contrast: No acute stroke.  Tiny venous angioma in the right parietal lobe noted, felt to be of no significance.  CTA head/neck: Unremarkable, no evidence of proximal intracranial high-grade stenosis or aneurysm.    Diagnosis:   Acute vertigo, suspected to be BPPV, with negative MRI brain and CTA head/neck  Right lid ptosis, possibly chronic, no acute changes on CTA    Plan:  PT vestibular eval  Discussed with Dr. Dawn and Melani DOTY. Neurology will sign off but please call if further questions/concerns.

## 2022-03-14 NOTE — THERAPY EVALUATION
Patient Name: Shanique Martinez  : 1935    MRN: [id number]                              Today's Date: 3/14/2022       Admit Date: 3/13/2022    Visit Dx:     ICD-10-CM ICD-9-CM   1. Dizziness  R42 780.4   2. Unable to ambulate  R26.2 719.7   3. Nausea  R11.0 787.02     Patient Active Problem List   Diagnosis   • Hyperlipidemia LDL goal <130   • SI joint arthritis   • Arthritis   • Encounter for long-term (current) drug use   • H/O hysterectomy for benign disease   • Menopause   • Dizziness     Past Medical History:   Diagnosis Date   • Abnormal skin growth 2017   • Arthritis    • Cancer (HCC)     Melanoma: L FA    • History of bone density study    • History of mammogram 2013   • History of mammogram 2011   • Hyperlipidemia      Past Surgical History:   Procedure Laterality Date   • COLONOSCOPY     • HYSTERECTOMY     • SKIN SURGERY      L FA - melanoma, Dr Caban       General Information     Row Name 22 1504          Physical Therapy Time and Intention    Document Type evaluation  -MS     Mode of Treatment physical therapy  -MS     Row Name 22 1504          General Information    Patient Profile Reviewed yes  -MS     Prior Level of Function independent:;all household mobility  use of quad cane, access to RW if needed, no fall hx  -MS     Barriers to Rehab medically complex  -MS     Row Name 22 1504          Living Environment    People in Home alone  -MS     Row Name 22 1504          Cognition    Orientation Status (Cognition) oriented x 4  -MS     Row Name 22 1504          Safety Issues, Functional Mobility    Safety Issues Affecting Function (Mobility) judgment  -MS     Impairments Affecting Function (Mobility) endurance/activity tolerance;balance;strength  -MS     Comment, Safety Issues/Impairments (Mobility) gait belt and non skid socks donned.  -MS           User Key  (r) = Recorded By, (t) = Taken By, (c) = Cosigned By    Initials Name  Provider Type    MS IglesiasMariam, PT Physical Therapist               Mobility     Row Name 03/14/22 1505          Bed Mobility    Bed Mobility supine-sit;sit-supine  -MS     Supine-Sit Union (Bed Mobility) supervision  -MS     Sit-Supine [place] (Bed Mobility) supervision  -MS     Comment, (Bed Mobility) SV for sitting balance.  -MS     Row Name 03/14/22 1505          Sit-Stand Transfer    Sit-Stand Union (Transfers) standby assist;verbal cues  -MS     Assistive Device (Sit-Stand Transfers) walker, front-wheeled  -MS     Row Name 03/14/22 1505          Gait/Stairs (Locomotion)    Union Level (Gait) contact guard;verbal cues  -MS     Assistive Device (Gait) walker, front-wheeled  -MS     Distance in Feet (Gait) 25'  -MS     Deviations/Abnormal Patterns (Gait) vanessa decreased;gait speed decreased  -MS     Bilateral Gait Deviations forward flexed posture  -MS     Comment, (Gait/Stairs) Slowed vanessa, minimally unsteady, denies incr dizziness with head turns, fatigued quickly and reported weakness  -MS           User Key  (r) = Recorded By, (t) = Taken By, (c) = Cosigned By    Initials Name Provider Type    MS Kelsie Mariam BURK, PT Physical Therapist               Obj/Interventions     Row Name 03/14/22 1506          Range of Motion Comprehensive    General Range of Motion no range of motion deficits identified  -MS     Row Name 03/14/22 1506          Strength Comprehensive (MMT)    Comment, General Manual Muscle Testing (MMT) Assessment B LEs grossly 3+/5, generalized weakness  -MS     Row Name 03/14/22 1506          Balance    Balance Assessment sitting static balance;standing static balance  -MS     Static Sitting Balance supervision  -MS     Position, Sitting Balance unsupported;sitting edge of bed  -MS     Static Standing Balance supervision  -MS     Position/Device Used, Standing Balance supported;walker, rolling  -MS     Row Name 03/14/22 1506          Sensory Assessment  (Somatosensory)    Sensory Assessment (Somatosensory) sensation intact  -MS           User Key  (r) = Recorded By, (t) = Taken By, (c) = Cosigned By    Initials Name Provider Type    Mariam Bonner BHARGAVI, PT Physical Therapist               Goals/Plan     Row Name 03/14/22 1507          Bed Mobility Goal 1 (PT)    Activity/Assistive Device (Bed Mobility Goal 1, PT) bed mobility activities, all  -MS     [place] Level/Cues Needed (Bed Mobility Goal 1, PT) supervision required  -MS     Time Frame (Bed Mobility Goal 1, PT) 1 week  -MS     Row Name 03/14/22 1507          Transfer Goal 1 (PT)    Activity/Assistive Device (Transfer Goal 1, PT) transfers, all;cane, quad  -MS     [place] Level/Cues Needed (Transfer Goal 1, PT) supervision required  -MS     Time Frame (Transfer Goal 1, PT) 1 week  -MS     Row Name 03/14/22 1507          Gait Training Goal 1 (PT)    Activity/Assistive Device (Gait Training Goal 1, PT) gait (walking locomotion);cane, quad  -MS     [place] Level (Gait Training Goal 1, PT) supervision required  -MS     Distance (Gait Training Goal 1, PT) 100'  -MS     Time Frame (Gait Training Goal 1, PT) 1 week  -MS           User Key  (r) = Recorded By, (t) = Taken By, (c) = Cosigned By    Initials Name Provider Type    Mariam Bonner BHARGAVI, PT Physical Therapist               Clinical Impression     Row Name 03/14/22 1506          Pain    Pretreatment Pain Rating 0/10 - no pain  -MS     Row Name 03/14/22 1506          Therapy Assessment/Plan (PT)    Rehab Potential (PT) fair, will monitor progress closely  -MS     Criteria for Skilled Interventions Met (PT) yes;meets criteria  -MS     Row Name 03/14/22 1506          Vital Signs    O2 Delivery Pre Treatment room air  -MS     Row Name 03/14/22 1506          Positioning and Restraints    Pre-Treatment Position in bed  -MS     Post Treatment Position bed  -MS     In Bed notified nsg;fowlers;call light within reach;encouraged to call for  assist;with family/caregiver  -MS           User Key  (r) = Recorded By, (t) = Taken By, (c) = Cosigned By    Initials Name Provider Type    Mariam Bonner, PT Physical Therapist               Outcome Measures     Row Name 03/14/22 1508 03/14/22 0800       How much help from another person do you currently need...    Turning from your back to your side while in flat bed without using bedrails? 3  -MS 4  -DM    Moving from lying on back to sitting on the side of a flat bed without bedrails? 3  -MS 4  -DM    Moving to and from a bed to a chair (including a wheelchair)? 3  -MS 3  -DM    Standing up from a chair using your arms (e.g., wheelchair, bedside chair)? 3  -MS 3  -DM    Climbing 3-5 steps with a railing? 2  -MS 2  -DM    To walk in hospital room? 3  -MS 3  -DM    AM-PAC 6 Clicks Score (PT) 17  -MS 19  -DM    Row Name 03/14/22 1508          Functional Assessment    Outcome Measure Options AM-PAC 6 Clicks Basic Mobility (PT)  -MS           User Key  (r) = Recorded By, (t) = Taken By, (c) = Cosigned By    Initials Name Provider Type    Kaylynn Paul, RN Registered Nurse    Mariam Bonner, PT Physical Therapist                             Physical Therapy Education                 Title: PT OT SLP Therapies (Done)     Topic: Physical Therapy (Done)     Point: Mobility training (Done)     Learning Progress Summary           Patient Acceptance, E,TB, VU,NR by MS at 3/14/2022 1508                   Point: Home exercise program (Done)     Learning Progress Summary           Patient Acceptance, E,TB, VU,NR by MS at 3/14/2022 1508                   Point: Body mechanics (Done)     Learning Progress Summary           Patient Acceptance, E,TB, VU,NR by MS at 3/14/2022 1508                   Point: Precautions (Done)     Learning Progress Summary           Patient Acceptance, E,TB, VU,NR by MS at 3/14/2022 1508                               User Key     Initials Effective Dates Name Provider Type  Discipline    MS 06/16/21 -  Mariam Iglesias PT Physical Therapist PT              PT Recommendation and Plan  Planned Therapy Interventions (PT): balance training, bed mobility training, gait training, home exercise program, patient/family education, postural re-education, ROM (range of motion), stair training, strengthening, transfer training        Time Calculation:    PT Charges     Row Name 03/14/22 1502             Time Calculation    Start Time 1432  -MS      Stop Time 1502  -MS      Time Calculation (min) 30 min  -MS      PT Received On 03/14/22  -MS      PT - Next Appointment 03/15/22  -MS              Time Calculation- PT    Total Timed Code Minutes- PT 20 minute(s)  -MS            User Key  (r) = Recorded By, (t) = Taken By, (c) = Cosigned By    Initials Name Provider Type    MS IglesiasMariam, PT Physical Therapist              Therapy Charges for Today     Code Description Service Date Service Provider Modifiers Qty    24239205384  PT EVAL MOD COMPLEXITY 3 3/14/2022 Mariam Iglesias, PT GP 1    [id number]  PT THERAPEUTIC ACT EA 15 MIN 3/14/2022 Mariam Iglesias, PT GP 1          PT G-Codes  Outcome Measure Options: AM-PAC 6 Clicks Basic Mobility (PT)  AM-PAC 6 Clicks Score (PT): 17    Mariam Iglesias PT  3/14/2022

## 2022-03-14 NOTE — PROGRESS NOTES
ED OBSERVATION PROGRESS/DISCHARGE SUMMARY    Date of Admission: 3/13/2022   LOS: 0 days   PCP: Alvarez Ron III, NP-C    Final Diagnosis: vertigo    Patient seen at:  Subjective     Hospital Outcome: Patient was admitted to observation unit for dizziness.  She was seen by Dr. Vela with neurology yesterday.  Dr. Vela feels this may be vestibular neuritis.  He did perform Epley maneuver without resolve.  Routine MRI with internal auditory canal protocol and CTA head and neck ordered by neuro, currently pending read.  Patient is in agreement with plan.    ROS:  General: no fevers, chills  Respiratory: no cough, dyspnea  Cardiovascular: no chest pain, palpitations  Abdomen: No abdominal pain, nausea, vomiting, or diarrhea  Neurologic: No focal weakness    Objective   Physical Exam:  I have reviewed the vital signs.  Temp:  [97.7 °F (36.5 °C)-98.1 °F (36.7 °C)] 98 °F (36.7 °C)  Heart Rate:  [86-97] 86  Resp:  [16-18] 16  BP: (145-188)/() 155/78  General Appearance:    Alert, cooperative, no distress  Head:    Normocephalic, atraumatic  Eyes:    Sclerae anicteric  Neck:   Supple, no mass  Lungs: Clear to auscultation bilaterally, respirations unlabored  Heart: Regular rate and rhythm, S1 and S2 normal, no murmur, rub or gallop  Abdomen:  Soft, non-tender, bowel sounds active, nondistended  Extremities: No clubbing, cyanosis, or edema to lower extremities  Pulses:  2+ and symmetric in distal lower extremities  Skin: No rashes   Neurologic: Oriented x3, Normal strength to extremities    Results Review:    I have reviewed the labs, radiology results and diagnostic studies.    Results from last 7 days   Lab Units 03/13/22  1054   WBC 10*3/mm3 10.33   HEMOGLOBIN g/dL 13.5   HEMATOCRIT % 39.7   PLATELETS 10*3/mm3 412     Results from last 7 days   Lab Units 03/13/22  1054   SODIUM mmol/L 139   POTASSIUM mmol/L 3.8   CHLORIDE mmol/L 102   CO2 mmol/L 25.0   BUN mg/dL 19   CREATININE mg/dL 0.76   CALCIUM  mg/dL 9.4   BILIRUBIN mg/dL 0.4   ALK PHOS U/L 105   ALT (SGPT) U/L 18   AST (SGOT) U/L 27   GLUCOSE mg/dL 120*     Imaging Results (Last 24 Hours)     Procedure Component Value Units Date/Time    CT Head Without Contrast [[id number]] Collected: 03/13/22 1134     Updated: 03/13/22 1144    Narrative:      CT HEAD     HISTORY:Dizziness for 2 days     TECHNIQUE: CT scan of the head was obtained with 3 mm axial images  without intravenous contrast.  Radiation dose reduction techniques were  utilized, including automated exposure control and exposure modulation  based on body size.     COMPARISON:None     FINDINGS:  There is no finding of acute infarct, hemorrhage, contusion or abnormal  extra-axial collection. No hydrocephalus is present.       Impression:      1.  No findings of acute intracranial abnormality.        This report was finalized on 3/13/2022 11:41 AM by Dr. Gibran Masters M.D.             I have reviewed the medications.  ---------------------------------------------------------------------------------------------  Assessment/Plan   Assessment/Problem List    Dizziness      Plan:      Dizziness  -Neurology consulted, Dr. Sauceda saw and evaluated patient   -MRI brain with and without contrast with internal auditory canal protocol ordered  -CTA head and neck ordered  -Meclizine as needed for vertigo, will add scopolamine patch  -Fall precautions     High blood pressure  -BP on admission 171/96, patient does not take blood pressure medications at home  -Continue to monitor with vital signs     Chronic bilateral lower extremity wounds  -Patient reports she follows outpatient with wound care stating that she just saw them Thursday of last week.  She states she just finished a course of doxycycline.  She states that they wrap her legs weekly and have advised her to not remove the dressings while at home.  -Wound care consult  -Patient afebrile with normal white blood cell count, no concern for acute  infection       Disposition: home    Follow-up after Discharge: PCP    This note will serve as discharge summary    LALITHA Rodrigez 03/14/22 10:54 EDT

## 2022-03-14 NOTE — CONSULTS
responded to spiritual care consult. Pt sitting up in bed, states she hasn't felt well for several days. Pt's adult daughter at bedside. Pt has adult children living in the area for support, as well as several grandchildren. Pt currently not active with a particular [employer], but stated she would like to go back to [employer] now that the pandemic seems to be slowing. I offered support and pt states she believes she will discharge today or tomorrow.

## 2022-03-14 NOTE — PLAN OF CARE
Goal Outcome Evaluation:    Patient is a pleasant and oriented 86 y.o. female admitted to [facility] for nausea, vomiting, and generalized weakness on 3/13/2022. Negative MRI brain and CTA head/neck. Patient is independent with a quad cane at baseline and lives at home alone. Today, patient performed bed mobility with SV, required SBA for transfers, and ambulated 25' CGA with a RW- noted endurance and strength deficits. Per Neuro, no relief of sx with Epley maneuver- patient pleasantly declined attempting again. Instructed patient on Flores-Daroff and appeared to provide some relief of sx. Noted nystagmus with R cervical rotation and appeared to improve and lessen after completion of exercises. Patient plans to return home upon DC- encouraged RW use to ensure home safety and possibly have daughter stay with her. Flores-Daroff exercise handout and vestibular education paperwork provided. Would recommend HH PT upon DC as well as follow up with vestibular OPPT. Likely discharge back home today- will follow up if discharge happens to be delayed.

## 2022-03-15 ENCOUNTER — READMISSION MANAGEMENT (OUTPATIENT)
Dept: CALL CENTER | Facility: HOSPITAL | Age: 87
End: 2022-03-15

## 2022-03-15 VITALS
OXYGEN SATURATION: 97 % | HEIGHT: 64 IN | BODY MASS INDEX: 33.07 KG/M2 | RESPIRATION RATE: 18 BRPM | SYSTOLIC BLOOD PRESSURE: 166 MMHG | WEIGHT: 193.7 LBS | TEMPERATURE: 98.2 F | DIASTOLIC BLOOD PRESSURE: 86 MMHG | HEART RATE: 87 BPM

## 2022-03-15 LAB — BACTERIA SPEC ANAEROBE CULT: NORMAL

## 2022-03-15 PROCEDURE — G0378 HOSPITAL OBSERVATION PER HR: HCPCS

## 2022-03-15 PROCEDURE — 97110 THERAPEUTIC EXERCISES: CPT

## 2022-03-15 PROCEDURE — 95992 CANALITH REPOSITIONING PROC: CPT

## 2022-03-15 RX ORDER — SCOLOPAMINE TRANSDERMAL SYSTEM 1 MG/1
1 PATCH, EXTENDED RELEASE TRANSDERMAL
Qty: 10 EACH | Refills: 0 | Status: SHIPPED | OUTPATIENT
Start: 2022-03-15 | End: 2022-04-14

## 2022-03-15 RX ADMIN — CYCLOBENZAPRINE 10 MG: 10 TABLET, FILM COATED ORAL at 09:12

## 2022-03-15 RX ADMIN — Medication 10 ML: at 09:13

## 2022-03-15 RX ADMIN — ATORVASTATIN CALCIUM 10 MG: 10 TABLET, FILM COATED ORAL at 09:12

## 2022-03-15 RX ADMIN — MECLIZINE HYDROCHLORIDE 25 MG: 25 TABLET ORAL at 09:12

## 2022-03-15 NOTE — PLAN OF CARE
"Goal Outcome Evaluation:   Pt rested well throughout shift-pt denies any concerns and states she feels \"better.\" Pt able to ambulate with assistance with no reports of dizziness. Pt rested in chair after several hours in bed due to reports of chronic back pain. VSS. Pt states \"Im ready to go home today.\"               "

## 2022-03-15 NOTE — PLAN OF CARE
Goal Outcome Evaluation:  Plan of Care Reviewed With: patient, daughter      Progress: improving  Outcome Evaluation: Patient pleasant and reportedly feeling better upon PT arrival- reports dizziness is still present but improved. Due to nystagmus with R cervical rotation, [place] Hallpike performed to the R followed by the Epley manuever- noted symptoms with cervical extension and R cervical rotation. Patient tolerated manuever well and reported mild relief of sx. At length discussion regarding follow up with  PT to address deconditioning as well as follow up with OPPT when cleared from  to address vestibular impairments. Daughter at bedside throughout therapy session.

## 2022-03-15 NOTE — PLAN OF CARE
Goal Outcome Evaluation: Patient states that her dizziness has significantly improved. She is currently wearing a scopolamine patch and had 25mg of meclizine at 0915 today for residual vertigo. She is to D/C home with her daughter and a prescription for scopolamine patch sent electronically to her pharmacy. She has a walker to ambulate at home. She is to follow up with outpatient vestibular therapy.

## 2022-03-15 NOTE — OUTREACH NOTE
Prep Survey    Flowsheet Row Responses   [facility] patient discharged from? [place]   Is LACE score < 7 ? Yes   Emergency Room discharge w/ pulse ox? No   Eligibility [facility]   Date of Admission 03/13/22   Date of Discharge 03/15/22   Discharge Disposition Home or Self Care   Discharge diagnosis Dizziness,   High blood pressure   Does the patient have one of the following disease processes/diagnoses(primary or secondary)? Other   Does the patient have Home health ordered? No   Is there a DME ordered? No   Prep survey completed? Yes          JOHANNA KIRK - Registered Nurse        
98

## 2022-03-15 NOTE — PROGRESS NOTES
[facility]     Progress Note    Patient Name: Shanique Martinez  : 1935  MRN: [id number]  Primary Care Physician:  Alvarez Ron III, NP-C  Date of admission: 3/13/2022    Subjective   Subjective     Chief Complaint: Dizziness    HPI:    Patient is a pleasant afebrile 86 year old  female admitted to observation unit for dizziness.  Patient was seen by neurology.  Imaging negative for acute stroke.  Feel this is peripheral BPPV.  Epley maneuver without resolution of symptoms.  Family felt best if patient stated 1 more night.  Symptoms seem to be improving with scopolamine patch.  Plan for vestibular rehab as outpatient.    Review of Systems  Review of Systems   All other systems reviewed and are negative.        Objective   Objective     Vitals:   Temp:  [97.1 °F (36.2 °C)-98.4 °F (36.9 °C)] 97.1 °F (36.2 °C)  Heart Rate:  [88-99] 89  Resp:  [16-18] 18  BP: (140-161)/(72-97) 147/84  Physical Exam   Physical Exam    GENERAL: 86-year-old female, alert and oriented x3, not distressed  HENT: nares patent, moist mucous membranes  EYES: no scleral icterus  CV: regular rhythm, regular rate  RESPIRATORY: normal effort, clear to auscultation bilaterally, no wheezes, rhonchi, or rales  ABDOMEN: soft, no rebound or guarding, normal bowel sounds  MUSCULOSKELETAL: no deformity  NEURO: alert, moves all extremities, follows commands  SKIN: warm, dry    Result Review    Result Review:  I have personally reviewed the results from the time of this admission to 3/15/2022 05:15 EDT and agree with these findings:  [x]  Laboratory  []  Microbiology  [x]  Radiology  []  EKG/Telemetry   []  Cardiology/Vascular   []  Pathology   []  Old records  []  Other:  Most notable findings include:     CT Head Without Contrast    Result Date: 3/13/2022  1.  No findings of acute intracranial abnormality.   This report was finalized on 3/13/2022 11:41 AM by Dr. Gibran Masters M.D.      MRI Internal Auditory Canal With  Wo    Result Date: 3/14/2022  1. No acute intracranial abnormality seen with no acute infarct identified. There is tiny venous angioma in the posterior inferior medial right parietal lobe which is a benign developmental venous anomaly of no significance otherwise the MRI of the brain is normal and the etiology of the patient's dizziness is not established on this exam.  This report was finalized on 3/14/2022 5:22 PM by Dr. Jt Cuba M.D.      CT Angiogram Carotids    Result Date: 3/14/2022  No evidence of acute infarction, intracranial hemorrhage or of abnormal enhancement. There is 0% stenosis involving the carotid bifurcations. There was no evidence of a proximal intracranial high-grade stenosis or aneurysm.    Radiation dose reduction techniques were utilized, including automated exposure control and exposure modulation based on body size.  This report was finalized on 3/14/2022 3:03 PM by Dr. Ludwig Hamilton M.D.      CT Angiogram Head    Result Date: 3/14/2022  No evidence of acute infarction, intracranial hemorrhage or of abnormal enhancement. There is 0% stenosis involving the carotid bifurcations. There was no evidence of a proximal intracranial high-grade stenosis or aneurysm.    Radiation dose reduction techniques were utilized, including automated exposure control and exposure modulation based on body size.  This report was finalized on 3/14/2022 3:03 PM by Dr. Ludwig Hamilton M.D.          Assessment/Plan   Assessment / Plan     Brief Patient Summary:  Shanique Martinez is a 86 y.o. female who is being evaluated for dizziness.    Active Hospital Problems:  Active Hospital Problems    Diagnosis    • Dizziness      Plan:        Dizziness  -MRI negative acute  -Vestibular rehab referral  -Meclizine, scopolamine patch  -Fall precautions     High blood pressure  -BP on admission 171/96, patient does not take blood pressure medications at home  -Continue to monitor with vital signs     Chronic bilateral lower  extremity wounds  -Patient reports she follows outpatient with wound care stating that she just saw them Thursday of last week.  She states she just finished a course of doxycycline.  She states that they wrap her legs weekly and have advised her to not remove the dressings while at home.  -Wound care consult  -Patient afebrile with normal white blood cell count, no concern for acute infection          DVT prophylaxis:  Mechanical DVT prophylaxis orders are present.    CODE STATUS:   Code Status (Patient has no pulse and is not breathing): CPR (Attempt to Resuscitate)  Medical Interventions (Patient has pulse or is breathing): Full Support    Disposition:  I expect patient to be discharged today.    This note also serves as discharge summary.    I wore an N95 mask, face shield, and gloves during this patient encounter. Patient also wearing a surgical mask. Hand hygeine performed before and after seeing the patient.      Electronically signed by EMILY Camargo, 03/15/22, 5:15 AM EDT.

## 2022-03-15 NOTE — NURSING NOTE
Wound/ostomy - consult received regarding compression wraps to BLE. Upon chart review it is noted patient was seen at the [facility] for the first time on 3/10 with the next scheduled appt 3/17. Patient was seen at PCP office on 3/2 and was noted to have multiple scattered open (acute and chronic) wounds to BLE and patient was referred to the [facility] at that time. Patient is here for dizziness and not related to the lower extremities.     Patient is in observation unit [facility] with plans for d/c today. There is a visitor in room and she reports that she was hoping the wound center would see her and change the wraps so they could save a trip and not come to the 3/17 appt. I explained that the [facility] and the hospital wound department are 2 separate entities and I work for the hospital. I explained that we should leave the wraps in place and allow the [facility] provider to reevaluate progress of the treatment regimen they established at first visit. I have not seen the wounds so I would not know if the treatment was helpful or needed to be adjusted. And also, appts can be difficult to secure at the [facility], especially if patient has a recurrent appt on Thursdays. I would not want to apply new wraps today, patient not go to this Thursday appt and then not be able to get back into be seen until the following Thursday which would leave the wraps on longer than 1 week.

## 2022-03-15 NOTE — THERAPY TREATMENT NOTE
Patient Name: Shanique Martinez  : 1935    MRN: [id number]                              Today's Date: 3/15/2022       Admit Date: 3/13/2022    Visit Dx:     ICD-10-CM ICD-9-CM   1. Dizziness  R42 780.4   2. Unable to ambulate  R26.2 719.7   3. Nausea  R11.0 787.02     Patient Active Problem List   Diagnosis   • Hyperlipidemia LDL goal <130   • SI joint arthritis   • Arthritis   • Encounter for long-term (current) drug use   • H/O hysterectomy for benign disease   • Menopause   • Dizziness     Past Medical History:   Diagnosis Date   • Abnormal skin growth 2017   • Arthritis    • Cancer (HCC)     Melanoma: L FA    • History of bone density study    • History of mammogram 2013   • History of mammogram 2011   • Hyperlipidemia      Past Surgical History:   Procedure Laterality Date   • COLONOSCOPY     • HYSTERECTOMY     • SKIN SURGERY      L FA - melanoma, Dr Caban       General Information     Row Name 03/15/22 1156          Physical Therapy Time and Intention    Document Type therapy note (daily note)  -MS     Mode of Treatment physical therapy  -MS     Row Name 03/15/22 1156          Cognition    Orientation Status (Cognition) oriented x 4  -MS     Row Name 03/15/22 1156          Safety Issues, Functional Mobility    Comment, Safety Issues/Impairments (Mobility) Non skid socks donned.  -MS           User Key  (r) = Recorded By, (t) = Taken By, (c) = Cosigned By    Initials Name Provider Type    MS IglesiasMariam, PT Physical Therapist               Mobility     Row Name 03/15/22 1157          Bed Mobility    Supine-Sit [place] (Bed Mobility) supervision  -MS     Sit-Supine [place] (Bed Mobility) supervision  -MS           User Key  (r) = Recorded By, (t) = Taken By, (c) = Cosigned By    Initials Name Provider Type    MS IglesiasMariam, PT Physical Therapist               Obj/Interventions     Row Name 03/15/22 1157          Balance    Position, Sitting Balance  supported;sitting edge of bed  -MS     Static Standing Balance independent  -MS           User Key  (r) = Recorded By, (t) = Taken By, (c) = Cosigned By    Initials Name Provider Type    MS MoralessMariam, PT Physical Therapist               Goals/Plan    No documentation.                Clinical Impression     Row Name 03/15/22 1157          Pain    Pretreatment Pain Rating 0/10 - no pain  -MS     Row Name 03/15/22 1157          Plan of Care Review    Plan of Care Reviewed With patient;daughter  -MS     Progress improving  -MS     Outcome Evaluation Patient pleasant and reportedly feeling better upon PT arrival- reports dizziness is still present but improved. Due to nystagmus with R cervical rotation, [place] Hallpike performed to the R followed by the Epley manuever- noted symptoms with cervical extension and R cervical rotation. Patient tolerated manuever well and reported mild relief of sx. At length discussion regarding follow up with  PT to address deconditioning as well as follow up with OPPT when cleared from  to address vestibular impairments. Daughter at bedside throughout therapy session.  -MS           User Key  (r) = Recorded By, (t) = Taken By, (c) = Cosigned By    Initials Name Provider Type    MS KelsieMariam, PT Physical Therapist               Outcome Measures     Row Name 03/15/22 1200          How much help from another person do you currently need...    Turning from your back to your side while in flat bed without using bedrails? 3  -MS     Moving from lying on back to sitting on the side of a flat bed without bedrails? 3  -MS     Moving to and from a bed to a chair (including a wheelchair)? 3  -MS     Standing up from a chair using your arms (e.g., wheelchair, bedside chair)? 3  -MS     Climbing 3-5 steps with a railing? 2  -MS     To walk in hospital room? 3  -MS     AM-PAC 6 Clicks Score (PT) 17  -MS           User Key  (r) = Recorded By, (t) = Taken By, (c) = Cosigned By     Initials Name Provider Type    MS IglesiasMariam, PT Physical Therapist                             Physical Therapy Education                 Title: PT OT SLP Therapies (Resolved)     Topic: Physical Therapy (Resolved)     Point: Mobility training (Resolved)     Learning Progress Summary           Patient Acceptance, E,TB, VU,NR by MS at 3/14/2022 1508                   Point: Home exercise program (Resolved)     Learning Progress Summary           Patient Acceptance, E,TB, VU,NR by MS at 3/14/2022 1508                   Point: Body mechanics (Resolved)     Learning Progress Summary           Patient Acceptance, E,TB, VU,NR by MS at 3/14/2022 1508                   Point: Precautions (Resolved)     Learning Progress Summary           Patient Acceptance, E,TB, VU,NR by MS at 3/14/2022 1508                               User Key     Initials Effective Dates Name Provider Type Discipline    MS 06/16/21 -  Mariam Iglesias PT Physical Therapist PT              PT Recommendation and Plan  Planned Therapy Interventions (PT): balance training, bed mobility training, gait training, home exercise program, patient/family education, postural re-education, ROM (range of motion), stair training, strengthening, transfer training  Plan of Care Reviewed With: patient, daughter  Progress: improving  Outcome Evaluation: Patient pleasant and reportedly feeling better upon PT arrival- reports dizziness is still present but improved. Due to nystagmus with R cervical rotation, [place] Hallpike performed to the R followed by the Epley manuever- noted symptoms with cervical extension and R cervical rotation. Patient tolerated manuever well and reported mild relief of sx. At length discussion regarding follow up with  PT to address deconditioning as well as follow up with OPPT when cleared from  to address vestibular impairments. Daughter at bedside throughout therapy session.     Time Calculation:    PT Charges     Row Name  03/15/22 1156             Time Calculation    Start Time 1024  -MS      Stop Time 1040  -MS      Time Calculation (min) 16 min  -MS      PT Received On 03/15/22  -MS      PT - Next Appointment 03/16/22  -MS            User Key  (r) = Recorded By, (t) = Taken By, (c) = Cosigned By    Initials Name Provider Type    Mariam Bonner, PT Physical Therapist              Therapy Charges for Today     Code Description Service Date Service Provider Modifiers Qty    92722746325  PT EVAL MOD COMPLEXITY 3 3/14/2022 Mariam Iglesias, PT GP 1    [id number]  PT THERAPEUTIC ACT EA 15 MIN 3/14/2022 Mariam Iglesias, PT GP 1    [id number]  PT CANALITH REPOSITIONING PER DAY 3/15/2022 Mariam Iglesias, PT GP 1    [id number]  PT THER PROC EA 15 MIN 3/15/2022 Mariam Iglesias, PT GP 1          PT G-Codes  Outcome Measure Options: AM-PAC 6 Clicks Basic Mobility (PT)  AM-PAC 6 Clicks Score (PT): 17    Mariam Iglesias PT  3/15/2022

## 2022-03-16 ENCOUNTER — APPOINTMENT (OUTPATIENT)
Dept: CARDIOLOGY | Facility: HOSPITAL | Age: 87
End: 2022-03-16

## 2022-03-16 ENCOUNTER — TRANSITIONAL CARE MANAGEMENT TELEPHONE ENCOUNTER (OUTPATIENT)
Dept: CALL CENTER | Facility: HOSPITAL | Age: 87
End: 2022-03-16

## 2022-03-16 NOTE — OUTREACH NOTE
Call Center TCM Note    Flowsheet Row Responses   [facility] patient discharged from? [facility]   Does the patient have one of the following disease processes/diagnoses(primary or secondary)? Other   TCM attempt successful? Yes   Discharge diagnosis Dizziness,   High blood pressure   Is patient permission given to speak with other caregiver? Yes   Person spoke with today (if not patient) and relationship dtr Hermila (Pt was present in room for this call)   Meds reviewed with patient/caregiver? Yes   Is the patient having any side effects they believe may be caused by any medication additions or changes? No   Does the patient have all medications ordered at discharge? Yes   Is the patient taking all medications as directed (includes completed medication regime)? Yes   Does the patient have a primary care provider?  Yes   Does the patient have an appointment with their PCP within 7 days of discharge? No   Comments regarding PCP Pt will call to sched appt at later date with PCP Dr Ron. She has several upcoming appts and testing right now.    Has the patient kept scheduled appointments due by today? N/A   Has home health visited the patient within 72 hours of discharge? N/A   Psychosocial issues? No   Did the patient receive a copy of their discharge instructions? Yes   Nursing interventions Reviewed instructions with patient   What is the patient's perception of their health status since discharge? Improving   Is the patient/caregiver able to teach back signs and symptoms related to disease process for when to call PCP? Yes   Is the patient/caregiver able to teach back signs and symptoms related to disease process for when to call 911? Yes   Is the patient/caregiver able to teach back the hierarchy of who to call/visit for symptoms/problems? PCP, Specialist, Home health nurse, Urgent Care, ED, 911 Yes   If the patient is a current smoker, are they able to teach back resources for cessation? Not a smoker   TCM  call completed? Yes   Wrap up additional comments Pt dizziness/lightheadedness/balance/nausea all improving. New rx Scopolamine in place. Pt has walker & w/c in home prn. Pt will be calling to sched appt with Vestibular P.T. Pt will call to sched appt at later date with PCP Dr Ron. She has several upcoming appts and testing right now.           Maggie Ness MA    3/16/2022, 11:43 EDT

## 2022-03-17 ENCOUNTER — TELEPHONE (OUTPATIENT)
Dept: INTERNAL MEDICINE | Facility: CLINIC | Age: 87
End: 2022-03-17

## 2022-03-17 ENCOUNTER — OFFICE VISIT (OUTPATIENT)
Dept: WOUND CARE | Facility: HOSPITAL | Age: 87
End: 2022-03-17

## 2022-03-17 NOTE — TELEPHONE ENCOUNTER
Caller: tomasz devine    Relationship: Emergency Contact    Best call back number: [phone]    What orders are you requesting (i.e. lab or imaging): ORDERS FOR PHYSICAL THERAPY    In what timeframe would the patient need to come in: ASAP    Where will you receive your lab/imaging services: HOME     Additional notes: PATIENT WAS DISCHARGED FROM THE HOSPITAL SHE WAS SUFFERING FROM VERTIGO.  THEY WOULD LIKE TO GET PHYSICAL THERAPY ORDERED.

## 2022-03-18 NOTE — TELEPHONE ENCOUNTER
Patient notified orders for home health are in her chart, she will contact our office if not contacted to set up appointment within the next few days.

## 2022-03-23 ENCOUNTER — HOSPITAL ENCOUNTER (OUTPATIENT)
Dept: CARDIOLOGY | Facility: HOSPITAL | Age: 87
Discharge: HOME | End: 2022-03-23
Admitting: NURSE PRACTITIONER

## 2022-03-23 ENCOUNTER — OFFICE VISIT (OUTPATIENT)
Dept: WOUND CARE | Facility: HOSPITAL | Age: 87
End: 2022-03-23

## 2022-03-23 DIAGNOSIS — I70.248 ATHSCL NATIVE ART OF LEFT LEG W ULCER OTH PRT LOWER LEFT LEG: ICD-10-CM

## 2022-03-23 LAB
BH CV LOWER ARTERIAL LEFT ABI RATIO: 1.24
BH CV LOWER ARTERIAL LEFT DORSALIS PEDIS SYS MAX: 193
BH CV LOWER ARTERIAL LEFT GREAT TOE SYS MAX: 133
BH CV LOWER ARTERIAL LEFT POST TIBIAL SYS MAX: 206
BH CV LOWER ARTERIAL LEFT TBI RATIO: 0.8
BH CV LOWER ARTERIAL RIGHT ABI RATIO: 1.17
BH CV LOWER ARTERIAL RIGHT DORSALIS PEDIS SYS MAX: 195
BH CV LOWER ARTERIAL RIGHT GREAT TOE SYS MAX: 120
BH CV LOWER ARTERIAL RIGHT POST TIBIAL SYS MAX: 181
BH CV LOWER ARTERIAL RIGHT TBI RATIO: 0.72
MAXIMAL PREDICTED HEART RATE: 135 BPM
STRESS TARGET HR: 115 BPM
UPPER ARTERIAL LEFT ARM BRACHIAL SYS MAX: 157
UPPER ARTERIAL RIGHT ARM BRACHIAL SYS MAX: 166

## 2022-03-23 PROCEDURE — G0463 HOSPITAL OUTPT CLINIC VISIT: HCPCS

## 2022-03-23 PROCEDURE — 93922 UPR/L XTREMITY ART 2 LEVELS: CPT

## 2022-03-24 ENCOUNTER — APPOINTMENT (OUTPATIENT)
Dept: WOUND CARE | Facility: HOSPITAL | Age: 87
End: 2022-03-24

## 2022-03-24 ENCOUNTER — TRANSCRIBE ORDERS (OUTPATIENT)
Dept: ADMINISTRATIVE | Facility: HOSPITAL | Age: 87
End: 2022-03-24

## 2022-03-24 DIAGNOSIS — I87.313 IDIOPATHIC CHRONIC VENOUS HYPERTENSION OF BOTH LOWER EXTREMITIES WITH ULCER: Primary | ICD-10-CM

## 2022-03-24 DIAGNOSIS — L97.929 IDIOPATHIC CHRONIC VENOUS HYPERTENSION OF BOTH LOWER EXTREMITIES WITH ULCER: Primary | ICD-10-CM

## 2022-03-24 DIAGNOSIS — L97.919 IDIOPATHIC CHRONIC VENOUS HYPERTENSION OF BOTH LOWER EXTREMITIES WITH ULCER: Primary | ICD-10-CM

## 2022-03-25 NOTE — TELEPHONE ENCOUNTER
Returned daughters call.    Advised that PT did try to reach out to schedule but they were un able to reach the Pt.     Number provided to contact PT for scheduling. [phone]

## 2022-03-25 NOTE — TELEPHONE ENCOUNTER
PATIENT'S DAUGHTER CALLED STATING THAT NEITHER SHE NOR THE PATIENT HAVE BEEN CONTACTED IN REGARDS TO THE PHYSICAL THERAPY REFERRAL.    SHE WOULD LIKE TO SPEAK WITH CLINICAL TO DISCUSS WHAT NEEDS TO HAPPEN TO GET THIS SCHEDULED.    PLEASE ADVISE  [phone]

## 2022-03-26 ENCOUNTER — HOME HEALTH ADMISSION (OUTPATIENT)
Dept: HOME HEALTH SERVICES | Facility: HOME HEALTHCARE | Age: 87
End: 2022-03-26

## 2022-03-28 ENCOUNTER — APPOINTMENT (OUTPATIENT)
Dept: WOUND CARE | Facility: HOSPITAL | Age: 87
End: 2022-03-28

## 2022-03-28 ENCOUNTER — TELEPHONE (OUTPATIENT)
Dept: INTERNAL MEDICINE | Facility: CLINIC | Age: 87
End: 2022-03-28

## 2022-03-28 NOTE — TELEPHONE ENCOUNTER
Hub staff attempted to follow warm transfer process and was unsuccessful     Caller: cassandra devine    Relationship to patient: Emergency Contact    Best call back number: [phone] (H)    Patient is needing: NEEDS TO SPEAK TO SOMEONE FROM THE OFFICE ABOUT PATIENT PHYSICAL THERAPY ORDER AND CODES.     PLEASE CONTACT CASSANDRA TO ADVISE.       THANKS

## 2022-03-29 ENCOUNTER — HOSPITAL ENCOUNTER (OUTPATIENT)
Dept: CARDIOLOGY | Facility: HOSPITAL | Age: 87
Discharge: HOME | End: 2022-03-29
Admitting: SURGERY

## 2022-03-29 DIAGNOSIS — L97.919 IDIOPATHIC CHRONIC VENOUS HYPERTENSION OF BOTH LOWER EXTREMITIES WITH ULCER: ICD-10-CM

## 2022-03-29 DIAGNOSIS — L97.929 IDIOPATHIC CHRONIC VENOUS HYPERTENSION OF BOTH LOWER EXTREMITIES WITH ULCER: ICD-10-CM

## 2022-03-29 DIAGNOSIS — I87.313 IDIOPATHIC CHRONIC VENOUS HYPERTENSION OF BOTH LOWER EXTREMITIES WITH ULCER: ICD-10-CM

## 2022-03-29 LAB

## 2022-03-29 PROCEDURE — 93970 EXTREMITY STUDY: CPT

## 2022-03-29 NOTE — TELEPHONE ENCOUNTER
I spoke with the pt's Daughter and she was calling about her Mother's home health order that was  denied due to the diagnosis. She did say that [facility] is seeing the pt for would care and is going to reach out to our office for a verbal ok for PT.

## 2022-04-04 ENCOUNTER — APPOINTMENT (OUTPATIENT)
Dept: WOUND CARE | Facility: HOSPITAL | Age: 87
End: 2022-04-04

## 2022-04-05 ENCOUNTER — HOSPITAL ENCOUNTER (EMERGENCY)
Facility: HOSPITAL | Age: 87
Discharge: HOME | End: 2022-04-05
Attending: EMERGENCY MEDICINE | Admitting: EMERGENCY MEDICINE

## 2022-04-05 VITALS
SYSTOLIC BLOOD PRESSURE: 152 MMHG | OXYGEN SATURATION: 96 % | HEIGHT: 64 IN | HEART RATE: 96 BPM | TEMPERATURE: 98.2 F | DIASTOLIC BLOOD PRESSURE: 79 MMHG | RESPIRATION RATE: 14 BRPM | WEIGHT: 200 LBS | BODY MASS INDEX: 34.15 KG/M2

## 2022-04-05 DIAGNOSIS — M54.42 ACUTE LEFT-SIDED LOW BACK PAIN WITH LEFT-SIDED SCIATICA: Primary | ICD-10-CM

## 2022-04-05 PROCEDURE — 63710000001 PREDNISONE PER 1 MG: Performed by: NURSE PRACTITIONER

## 2022-04-05 PROCEDURE — 96372 THER/PROPH/DIAG INJ SC/IM: CPT

## 2022-04-05 PROCEDURE — 99283 EMERGENCY DEPT VISIT LOW MDM: CPT

## 2022-04-05 PROCEDURE — 25010000002 KETOROLAC TROMETHAMINE PER 15 MG: Performed by: NURSE PRACTITIONER

## 2022-04-05 PROCEDURE — 63710000001 ONDANSETRON ODT 4 MG TABLET DISPERSIBLE: Performed by: NURSE PRACTITIONER

## 2022-04-05 RX ORDER — PREDNISONE 20 MG/1
40 TABLET ORAL DAILY
Qty: 10 TABLET | Refills: 0 | Status: SHIPPED | OUTPATIENT
Start: 2022-04-05 | End: 2022-04-10

## 2022-04-05 RX ORDER — PREDNISONE 20 MG/1
60 TABLET ORAL ONCE
Status: COMPLETED | OUTPATIENT
Start: 2022-04-05 | End: 2022-04-05

## 2022-04-05 RX ORDER — ONDANSETRON 4 MG/1
4 TABLET, ORALLY DISINTEGRATING ORAL ONCE
Status: COMPLETED | OUTPATIENT
Start: 2022-04-05 | End: 2022-04-05

## 2022-04-05 RX ORDER — OXYCODONE HYDROCHLORIDE AND ACETAMINOPHEN 5; 325 MG/1; MG/1
1 TABLET ORAL ONCE
Status: COMPLETED | OUTPATIENT
Start: 2022-04-05 | End: 2022-04-05

## 2022-04-05 RX ORDER — OXYCODONE HYDROCHLORIDE AND ACETAMINOPHEN 5; 325 MG/1; MG/1
1 TABLET ORAL EVERY 6 HOURS PRN
Qty: 10 TABLET | Refills: 0 | Status: SHIPPED | OUTPATIENT
Start: 2022-04-05 | End: 2022-04-14

## 2022-04-05 RX ORDER — KETOROLAC TROMETHAMINE 30 MG/ML
30 INJECTION, SOLUTION INTRAMUSCULAR; INTRAVENOUS ONCE
Status: COMPLETED | OUTPATIENT
Start: 2022-04-05 | End: 2022-04-05

## 2022-04-05 RX ADMIN — ONDANSETRON 4 MG: 4 TABLET, ORALLY DISINTEGRATING ORAL at 12:46

## 2022-04-05 RX ADMIN — KETOROLAC TROMETHAMINE 30 MG: 30 INJECTION, SOLUTION INTRAMUSCULAR at 12:46

## 2022-04-05 RX ADMIN — PREDNISONE 60 MG: 20 TABLET ORAL at 12:46

## 2022-04-05 RX ADMIN — OXYCODONE HYDROCHLORIDE AND ACETAMINOPHEN 1 TABLET: 5; 325 TABLET ORAL at 12:46

## 2022-04-05 NOTE — ED PROVIDER NOTES
EMERGENCY DEPARTMENT ENCOUNTER    Room Number:  B01/01  Date seen:  4/5/2022  Time seen: 12:16 EDT  PCP: Alvarez Ron III NP-C  Historian: patient    HPI:  Chief complaint:left lower back pain, sciatica  A complete HPI/ROS/PMH/PSH/SH/FH are unobtainable due to: n/a  Context:Shanique Martinez is a 86 y.o. female who presents to the ED with c/o 2 weeks of lower left back pain with a daily progression of left buttock and anterior left leg pain that is described as moderate.  Pain made better with rest, worse with movement.  She states she was here 2 weeks ago and admitted overnight for vertigo and since her overnight stay she has had an increase in her arthritic back pain.  She states prior to the admission she was ambulatory with a cane but since admission she has been using walker and that this morning she had difficulty using the walker due to pain. She DENIES any loss of bowel or bladder control, falls.     Patient was placed in face mask in first look. Patient was wearing facemask when I entered the room and throughout our encounter. I wore full protective equipment throughout this patient encounter including a N95 face mask, eye shield and gloves. Hand hygiene/washing of hands was performed before donning protective equipment and after removal when leaving the room.    MEDICAL RECORD REVIEW    ALLERGIES  Bactrim [sulfamethoxazole-trimethoprim]    PAST MEDICAL HISTORY  Active Ambulatory Problems     Diagnosis Date Noted   • Hyperlipidemia LDL goal <130 11/08/2016   • SI joint arthritis 11/08/2016   • Arthritis 09/25/2018   • Encounter for long-term (current) drug use 09/25/2018   • H/O hysterectomy for benign disease 10/01/2012   • Menopause 10/01/2012   • Dizziness 03/13/2022     Resolved Ambulatory Problems     Diagnosis Date Noted   • Abnormal skin growth 07/17/2017     Past Medical History:   Diagnosis Date   • Cancer (HCC) 2010   • History of bone density study 2008   • History of mammogram  11/01/2013   • History of mammogram 09/23/2011   • Hyperlipidemia        PAST SURGICAL HISTORY  Past Surgical History:   Procedure Laterality Date   • COLONOSCOPY  2006   • HYSTERECTOMY     • SKIN SURGERY  2000    L SE[facility] - melanoma, Dr Caban        FAMILY HISTORY  Family History   Problem Relation Age of Onset   • Heart disease Mother    • Heart disease Father         Lived until he was 102 yoa    • Cancer Sister    • Stroke Sister    • Breast cancer Sister    • No Known Problems Brother    • No Known Problems Daughter    • No Known Problems Son    • No Known Problems Maternal Grandmother    • No Known Problems Paternal Grandmother    • No Known Problems Maternal Aunt    • No Known Problems Paternal Aunt    • BRCA 1/2 Neg Hx    • Colon cancer Neg Hx    • Endometrial cancer Neg Hx    • Ovarian cancer Neg Hx        SOCIAL HISTORY  Social History     Socioeconomic History   • Marital status:    Tobacco Use   • Smoking status: Never Smoker   • Smokeless tobacco: Never Used   Vaping Use   • Vaping Use: Never used   Substance and Sexual Activity   • Alcohol use: No   • Drug use: No   • Sexual activity: Defer       REVIEW OF SYSTEMS  Review of Systems    All systems reviewed and negative except for those discussed in HPI.     PHYSICAL EXAM    ED Triage Vitals [04/05/22 1211]   Temp Heart Rate Resp BP SpO2   98.2 °F (36.8 °C) 96 14 -- 96 %      Temp src Heart Rate Source Patient Position BP Location FiO2 (%)   Tympanic -- -- -- --     Physical Exam    I have reviewed the triage vital signs and nursing notes.      GENERAL: not distressed  HENT: nares patent  EYES: no scleral icterus  NECK: no ROM limitations  CV: regular rhythm, regular rate, no murmur, no rubs, no gallups  RESPIRATORY: normal effort, CTAB  ABDOMEN: soft  : deferred  MUSCULOSKELETAL: no deformity, mild left sided sciatica area tenderness.  Pedal pushes strong and equal.  No loss of sensation to BLE's.  No focal lumbar spinal pain.   NEURO: alert, moves  all extremities, follows commands  SKIN: warm, dry      PROGRESS, DATA ANALYSIS, CONSULTS AND MEDICAL DECISION MAKING  All labs have been independently reviewed by me.  All radiology studies have been reviewed by me and discussed with radiologist dictating the report.  EKG's independently viewed and interpreted by me unless stated otherwise. Discussion below represents my analysis of pertinent findings related to patient's condition, differential diagnosis, treatment plan and final disposition.     ED Course as of 04/05/22 1340   Tue Apr 05, 2022   1319 MDM/dispo:  Pt reports she is feeling better already.  We discussed plan for d/c home.  Will give a few days of Prednisone and limited supply of opiates.  I discussed with her to add a Tylenol arthritis formula twice a day to be taken with her naproxen.    The patient endorse NO: fevers, chills, recent spinal procedures, bowel/bladder incontinence, IV drug use, cancer, recent weight loss, trauma ,weakness numbness, or dysuria     [EW]      ED Course User Index  [EW] [place], Cristaljose Bourgeois, APRN     DDX: lumbago, lumbar strain, left back pain with sciatica    Reviewed pt's history and workup with Dr. Bateman.  After a bedside evaluation, Dr. Bateman agrees with the plan of care.    The patient's history, physical exam, and lab findings were discussed with the physician, who also performed a face to face history and physical exam.  I discussed all results and noted any abnormalities with patient.  Discussed absoute need to recheck abnormalities with their family physician.  I answered any of the patient's questions.  Discussed plan for discharge, as there is no emergent indication for admission.  Pt is agreeable and understands need for follow up and repeat testing.  Pt is aware that discharge does not mean that nothing is wrong but it indicates no emergency is present and they must continue care with their family physician.  Pt is discharged with instructions to follow up  "with primary care doctor to have their blood pressure rechecked.         Disposition vitals:  /79   Pulse 96   Temp 98.2 °F (36.8 °C) (Tympanic)   Resp 14   Ht 162.6 cm (64\")   Wt 90.7 kg (200 lb)   SpO2 96%   BMI 34.33 kg/m²       DIAGNOSIS  Final diagnoses:   Acute left-sided low back pain with left-sided sciatica       FOLLOW UP   Alvarez Ron III, NP-C  [address] [address]  [phone]    Schedule an appointment as soon as possible for a visit in 1 week  As needed         Cristal Rankin, APRN  04/05/22 1341    "

## 2022-04-05 NOTE — ED NOTES
Pt c/o lower back pain with radiation into left leg for two weeks.     Mask placed on patient in triage. Triage staff wore appropriate PPE during interaction with patient.

## 2022-04-05 NOTE — DISCHARGE INSTRUCTIONS
Continue using walker    YOU HAVE BEEN PRESCRIBED NARCOTIC PAIN MEDICATION    Narcotic pain medications can be addicting; only take them when needed    You have only been given a 2-3 day supply    Do not operate heavy machinery or make important decisions while taking narcotics    Take an over the counter stool softener while taking pain pills to avoid constipation    Do not consume alcohol while taking pain medication as this can be fatal    Do not share your pain medication with others    Store pain medication securely to prevent accidental exposure or death    Try ice vs heat.    Return Precautions    Although you are being discharged from the ED today, I encourage you to return for worsening symptoms.  Things can, and do, change such that treatment at home with medication may not be adequate.      Specifically, return for any of the following:    Chest pain, shortness of breath, pain or nausea and vomiting not controlled by medications provided.    Please make a follow up with your Primary Care Provider for a blood pressure recheck.

## 2022-04-07 ENCOUNTER — APPOINTMENT (OUTPATIENT)
Dept: WOUND CARE | Facility: HOSPITAL | Age: 87
End: 2022-04-07

## 2022-04-11 ENCOUNTER — TELEPHONE (OUTPATIENT)
Dept: INTERNAL MEDICINE | Facility: CLINIC | Age: 87
End: 2022-04-11

## 2022-04-11 NOTE — TELEPHONE ENCOUNTER
Caller: tomasz devine    Relationship: Emergency Contact    Best call back number: [phone]    What medication are you requesting: ETODOLAC    What are your current symptoms: PAIN IN LOWER BACK    How long have you been experiencing symptoms: YEARS    Have you had these symptoms before:    [x] Yes  [] No    Have you been treated for these symptoms before:   [x] Yes  [] No    If a prescription is needed, what is your preferred pharmacy and phone number: [facility] [address] - [phone] [facility] [phone]      Additional notes:PATIENTS DAUGHTER FEELS THAT THE NAPROXEN IS NO LONGER WORKING AND WANTED TO SEE IF JERSEY GARNETT WOULD SEND IN THE ABOVE MEDICATION TO TRY TO SEE IF IT RELIEVES HER PAIN BETTER.

## 2022-04-14 ENCOUNTER — OFFICE VISIT (OUTPATIENT)
Dept: INTERNAL MEDICINE | Facility: CLINIC | Age: 87
End: 2022-04-14

## 2022-04-14 ENCOUNTER — OFFICE VISIT (OUTPATIENT)
Dept: WOUND CARE | Facility: HOSPITAL | Age: 87
End: 2022-04-14

## 2022-04-14 VITALS
DIASTOLIC BLOOD PRESSURE: 62 MMHG | SYSTOLIC BLOOD PRESSURE: 124 MMHG | HEIGHT: 64 IN | WEIGHT: 190 LBS | OXYGEN SATURATION: 99 % | BODY MASS INDEX: 32.44 KG/M2 | HEART RATE: 91 BPM | TEMPERATURE: 98.4 F

## 2022-04-14 DIAGNOSIS — M54.42 CHRONIC LEFT-SIDED LOW BACK PAIN WITH LEFT-SIDED SCIATICA: Primary | ICD-10-CM

## 2022-04-14 DIAGNOSIS — G89.29 CHRONIC LEFT-SIDED LOW BACK PAIN WITH LEFT-SIDED SCIATICA: Primary | ICD-10-CM

## 2022-04-14 PROCEDURE — 99213 OFFICE O/P EST LOW 20 MIN: CPT | Performed by: NURSE PRACTITIONER

## 2022-04-14 RX ORDER — HYDROCODONE BITARTRATE AND ACETAMINOPHEN 7.5; 325 MG/1; MG/1
1 TABLET ORAL EVERY 8 HOURS PRN
Qty: 9 TABLET | Refills: 0 | Status: SHIPPED | OUTPATIENT
Start: 2022-04-14 | End: 2022-08-10

## 2022-04-14 RX ORDER — ETODOLAC 200 MG/1
200 CAPSULE ORAL EVERY 8 HOURS
Qty: 90 CAPSULE | Refills: 2 | Status: SHIPPED | OUTPATIENT
Start: 2022-04-14 | End: 2022-07-15

## 2022-04-14 NOTE — PROGRESS NOTES
Chief Complaint  Hospital Follow Up Visit (Back Pain and leg pain )     Subjective:      History of Present Illness {CC  Problem List  Visit  Diagnosis   Encounters  Notes  Medications  Labs  Result Review Imaging  Media :23}     Shanique Martinez presents to [facility] for ER follow up.     She went to [facility]  ER on 4/5/2022 for 2 weeks LBP. Better with rest worse with movement.     This has been chronic for her.  Exacerbated when she was admitted for vertigo 3/13/22 and slept in hospital bed.     She was given percocet, prednisone at recent admission and discharged home.  No imaging was done.     She states she has been seeing ortho and due to get SI joint injection in the next week.     Her daughter is here with her today and states when they go, they will notify me if they are able to do other interventions there - if not, she will send me a message and I will refer her to pain management.     No weakness in legs, no change in bowel or bladder.          History of Present Illness       I have reviewed patient's medical history, any new submitted information provided by patient or medical assistant and updated medical record.      Objective:      Physical Exam  Vitals reviewed.   Constitutional:       Appearance: Normal appearance. She is well-developed.      Comments: Ambulating with walker    HENT:      Head:      Comments: Wearing mask due to COVID   Neck:      Thyroid: No thyromegaly.   Cardiovascular:      Rate and Rhythm: Normal rate and regular rhythm.      Pulses: Normal pulses.      Heart sounds: Normal heart sounds.   Pulmonary:      Effort: Pulmonary effort is normal.      Breath sounds: Normal breath sounds. No wheezing.      Comments: E/U   Musculoskeletal:      Lumbar back: No tenderness or bony tenderness. Normal range of motion. Positive left straight leg raise test.      Comments: Tender over left si joint    Skin:     General:  "Skin is warm and dry.      Capillary Refill: Capillary refill takes 2 to 3 seconds.   Neurological:      Mental Status: She is alert and oriented to person, place, and time.   Psychiatric:         Mood and Affect: Mood normal.         Behavior: Behavior normal. Behavior is cooperative.         Thought Content: Thought content normal.         Judgment: Judgment normal.        Result Review  Data Reviewed:{ Labs  Result Review  Imaging  Med Tab  Media :23}                Vital Signs:   /62 (BP Location: Left arm, Patient Position: Sitting, Cuff Size: Adult)   Pulse 91   Temp 98.4 °F (36.9 °C) (Temporal)   Ht 162.6 cm (64\")   Wt 86.2 kg (190 lb)   SpO2 99%   BMI 32.61 kg/m²         Requested Prescriptions     Signed Prescriptions Disp Refills   • etodolac (LODINE) 200 MG capsule 90 capsule 2     Sig: Take 1 capsule by mouth Every 8 (Eight) Hours. Take with FOOD   • HYDROcodone-acetaminophen (Norco) 7.5-325 MG per tablet 9 tablet 0     Sig: Take 1 tablet by mouth Every 8 (Eight) Hours As Needed for Severe Pain .       Routine medications provided by this office will also be refilled via pharmacy request.       Current Outpatient Medications:   •  atorvastatin (LIPITOR) 10 MG tablet, TAKE ONE TABLET BY MOUTH DAILY, Disp: 90 tablet, Rfl: 4  •  CALCIUM PO, Take 1 tablet by mouth Daily., Disp: , Rfl:   •  cyclobenzaprine (FLEXERIL) 10 MG tablet, TAKE ONE TABLET BY MOUTH THREE TIMES A DAY, Disp: 270 tablet, Rfl: 3  •  prednisoLONE acetate (PRED FORTE) 1 % ophthalmic suspension, , Disp: , Rfl:   •  etodolac (LODINE) 200 MG capsule, Take 1 capsule by mouth Every 8 (Eight) Hours. Take with FOOD, Disp: 90 capsule, Rfl: 2  •  HYDROcodone-acetaminophen (Norco) 7.5-325 MG per tablet, Take 1 tablet by mouth Every 8 (Eight) Hours As Needed for Severe Pain ., Disp: 9 tablet, Rfl: 0     Assessment and Plan:      Assessment and Plan {CC Problem List  Visit Diagnosis  ROS  Review (Popup)  Health Maintenance  Quality "  BestPractice  Medications  [facility]  SnapShot Encounters  Media :23}     Problem List Items Addressed This Visit    None     Visit Diagnoses     Chronic left-sided low back pain with left-sided sciatica    -  Primary    Relevant Medications    etodolac (LODINE) 200 MG capsule    HYDROcodone-acetaminophen (Norco) 7.5-325 MG per tablet        Will change her from anaprox to etodolac.   I discussed medication use, dosing and possible side effects.   Patient was given opportunity to ask any questions.     Norco to get her relief while she is working to increase ROM.  No further refills on norco.     Possible referral to pain management:  Daughter will let me know.     Follow Up {Instructions Charge Capture  Follow-up Communications :23}     Return if symptoms worsen or fail to improve.    Patient was given instructions and counseling regarding her condition or for health maintenance advice. Please see specific information pulled into the AVS if appropriate.    Dimas disclaimer:   Much of this encounter note is an electronic transcription/translation of spoken language to printed text. The electronic translation of spoken language may permit erroneous, or at times, nonsensical words or phrases to be inadvertently transcribed; Although I have reviewed the note for such errors, some may still exist.     Additional Patient Counseling:       There are no Patient Instructions on file for this visit.

## 2022-04-22 ENCOUNTER — TELEPHONE (OUTPATIENT)
Dept: INTERNAL MEDICINE | Facility: CLINIC | Age: 87
End: 2022-04-22

## 2022-04-22 NOTE — TELEPHONE ENCOUNTER
Caller: VERÓNICA WITH [employer]    Relationship:     Best call back number: [phone]    What is the best time to reach you: ANYTIME    Who are you requesting to speak with (clinical staff, provider,  specific staff member): CLINICAL STAFF    Do you know the name of the person who called:      What was the call regarding: VERÓNICA CALLED TO ADVISE THAT PATIENT WAS GETTING UP OUT OF HER RECLINER TO GO TO THE BATHROOM AND MUST NOT HAVE BEEN FULLY WOKE AND TRIPPED AND FELL INTO HER END TABLE. VERÓNICA STATES THAT PATIENT HAS A LITTLE LACERATION ON TOP HER HEAD THAT SHE COULD TELL BY THE DRIED UP BLOOD THAT PATIENT'S LACERATION HAD BEEN BLEEDING.     Do you require a callback: IF NEEDED        THANKS

## 2022-04-28 ENCOUNTER — OFFICE VISIT (OUTPATIENT)
Dept: WOUND CARE | Facility: HOSPITAL | Age: 87
End: 2022-04-28

## 2022-05-04 ENCOUNTER — TELEPHONE (OUTPATIENT)
Dept: INTERNAL MEDICINE | Facility: CLINIC | Age: 87
End: 2022-05-04

## 2022-05-04 DIAGNOSIS — M54.42 CHRONIC LEFT-SIDED LOW BACK PAIN WITH LEFT-SIDED SCIATICA: Primary | ICD-10-CM

## 2022-05-04 DIAGNOSIS — G89.29 CHRONIC LEFT-SIDED LOW BACK PAIN WITH LEFT-SIDED SCIATICA: Primary | ICD-10-CM

## 2022-05-04 NOTE — TELEPHONE ENCOUNTER
Caller: tomasz devine    Relationship: Emergency Contact    Best call back number: [phone]    What is the medical concern/diagnosis: PAIN IN LUMBAR     What specialty or service is being requested: PAIN MANAGEMENT     What is the provider, practice or medical service name: N\A     Any additional details: PATIENT IS WANTING TO GET A REFERRAL TO PAIN MANAGEMENT

## 2022-05-11 RX ORDER — CYCLOBENZAPRINE HCL 10 MG
TABLET ORAL
Qty: 90 TABLET | Refills: 0 | Status: SHIPPED | OUTPATIENT
Start: 2022-05-11 | End: 2022-07-15

## 2022-05-12 ENCOUNTER — OFFICE VISIT (OUTPATIENT)
Dept: WOUND CARE | Facility: HOSPITAL | Age: 87
End: 2022-05-12

## 2022-05-26 ENCOUNTER — OFFICE VISIT (OUTPATIENT)
Dept: WOUND CARE | Facility: HOSPITAL | Age: 87
End: 2022-05-26

## 2022-05-26 PROCEDURE — G0463 HOSPITAL OUTPT CLINIC VISIT: HCPCS

## 2022-07-14 DIAGNOSIS — M54.42 CHRONIC LEFT-SIDED LOW BACK PAIN WITH LEFT-SIDED SCIATICA: ICD-10-CM

## 2022-07-14 DIAGNOSIS — G89.29 CHRONIC LEFT-SIDED LOW BACK PAIN WITH LEFT-SIDED SCIATICA: ICD-10-CM

## 2022-07-15 RX ORDER — ETODOLAC 200 MG/1
CAPSULE ORAL
Qty: 90 CAPSULE | Refills: 2 | Status: SHIPPED | OUTPATIENT
Start: 2022-07-15 | End: 2022-10-12

## 2022-07-15 RX ORDER — CYCLOBENZAPRINE HCL 10 MG
TABLET ORAL
Qty: 90 TABLET | Refills: 0 | Status: SHIPPED | OUTPATIENT
Start: 2022-07-15 | End: 2022-08-15

## 2022-08-01 ENCOUNTER — OFFICE VISIT (OUTPATIENT)
Dept: PAIN MEDICINE | Facility: CLINIC | Age: 87
End: 2022-08-01

## 2022-08-01 ENCOUNTER — TRANSCRIBE ORDERS (OUTPATIENT)
Dept: SURGERY | Facility: SURGERY CENTER | Age: 87
End: 2022-08-01

## 2022-08-01 ENCOUNTER — PREP FOR SURGERY (OUTPATIENT)
Dept: SURGERY | Facility: SURGERY CENTER | Age: 87
End: 2022-08-01

## 2022-08-01 VITALS
OXYGEN SATURATION: 97 % | SYSTOLIC BLOOD PRESSURE: 158 MMHG | RESPIRATION RATE: 20 BRPM | HEART RATE: 91 BPM | TEMPERATURE: 97.7 F | BODY MASS INDEX: 31.76 KG/M2 | HEIGHT: 64 IN | DIASTOLIC BLOOD PRESSURE: 89 MMHG | WEIGHT: 186 LBS

## 2022-08-01 DIAGNOSIS — Z41.9 SURGERY, ELECTIVE: Primary | ICD-10-CM

## 2022-08-01 DIAGNOSIS — G89.4 CHRONIC PAIN SYNDROME: Primary | ICD-10-CM

## 2022-08-01 DIAGNOSIS — M54.16 LEFT LUMBAR RADICULOPATHY: ICD-10-CM

## 2022-08-01 DIAGNOSIS — M54.42 CHRONIC LEFT-SIDED LOW BACK PAIN WITH LEFT-SIDED SCIATICA: Primary | ICD-10-CM

## 2022-08-01 DIAGNOSIS — M48.061 NEURAL FORAMINAL STENOSIS OF LUMBAR SPINE: ICD-10-CM

## 2022-08-01 DIAGNOSIS — G89.29 CHRONIC LEFT-SIDED LOW BACK PAIN WITH LEFT-SIDED SCIATICA: Primary | ICD-10-CM

## 2022-08-01 PROCEDURE — 99204 OFFICE O/P NEW MOD 45 MIN: CPT | Performed by: ANESTHESIOLOGY

## 2022-08-01 NOTE — PROGRESS NOTES
CHIEF COMPLAINT  New pt referred to our office by Alvarez Ron III, NP-C for back pain. Pt sts was hospitalized 3/2022 for vertigo, back pain began after hospitalization. Pt sts went to PCP referred to [facility], received 1 lumbar steroid inj in 4/2022. Pt sts didn't receive any relief for back pain. Pt sts back pain radiates to left sciatica and left leg, no pain on right side. Pt sts right leg feels weak, has had some falls this year due to weakness. Pt sts currently taking tylenol arthritis, lodine, and flexeril for pain. Pt sts uses TENS unit at home, receives some relief from TENS and heating pad. Pt sts had MRI lumbar 4/23/22.     Subjective   Shanique Martinez is a 86 y.o. female.   She presents to the office for evaluation of back pain. She was referred here by RAMANDEEP DOTY.  She has some significant facet arthritis especially at L4-5 and in known associated foraminal stenosis.    She has a history of known longstanding sacroiliac dysfunction and occasionally receives sacroiliac injections at the [facility] office of [facility].  With experiencing this chronic pain for many years she describes her more recent painful flareup over the past few months as  different and much more severe and distracting and debilitating.      Back Pain  The current episode started more than 1 month ago. The problem occurs constantly. The problem has been gradually worsening since onset. The pain is present in the lumbar spine. The quality of the pain is described as aching, burning and shooting. The pain radiates to the left thigh, left foot and left knee. The pain is moderate. The symptoms are aggravated by bending, standing and twisting. Associated symptoms include weakness (left leg). Pertinent negatives include no chest pain, fever, headaches or numbness. Risk factors include menopause, obesity and lack of exercise. She has tried analgesics, bed rest, heat, NSAIDs, muscle relaxant, ice and home  exercises for the symptoms.        PEG Assessment   What number best describes your pain on average in the past week?4  What number best describes how, during the past week, pain has interfered with your enjoyment of life?4  What number best describes how, during the past week, pain has interfered with your general activity?  4    --  The aforementioned information the Chief Complaint section and above subjective data including any HPI data, and also the Review of Systems data, has been personally reviewed and affirmed.  --        Current Outpatient Medications:   •  atorvastatin (LIPITOR) 10 MG tablet, TAKE ONE TABLET BY MOUTH DAILY, Disp: 90 tablet, Rfl: 4  •  CALCIUM PO, Take 1 tablet by mouth Daily., Disp: , Rfl:   •  cyclobenzaprine (FLEXERIL) 10 MG tablet, TAKE ONE TABLET BY MOUTH THREE TIMES A DAY, Disp: 90 tablet, Rfl: 0  •  etodolac (LODINE) 200 MG capsule, TAKE ONE CAPSULE BY MOUTH EVERY 8 HOURS WITH FOOD, Disp: 90 capsule, Rfl: 2  •  HYDROcodone-acetaminophen (Norco) 7.5-325 MG per tablet, Take 1 tablet by mouth Every 8 (Eight) Hours As Needed for Severe Pain ., Disp: 9 tablet, Rfl: 0  •  prednisoLONE acetate (PRED FORTE) 1 % ophthalmic suspension, , Disp: , Rfl:     The following portions of the patient's history were reviewed and updated as appropriate: allergies, current medications, past family history, past medical history, past social history, past surgical history and problem list.    -------    The following portions of the patient's history were reviewed and updated as appropriate: allergies, current medications, past family history, past medical history, past social history, past surgical history and problem list.    Allergies   Allergen Reactions   • Bactrim [Sulfamethoxazole-Trimethoprim] Rash         Current Outpatient Medications:   •  atorvastatin (LIPITOR) 10 MG tablet, TAKE ONE TABLET BY MOUTH DAILY, Disp: 90 tablet, Rfl: 4  •  CALCIUM PO, Take 1 tablet by mouth Daily., Disp: , Rfl:   •   cyclobenzaprine (FLEXERIL) 10 MG tablet, TAKE ONE TABLET BY MOUTH THREE TIMES A DAY, Disp: 90 tablet, Rfl: 0  •  etodolac (LODINE) 200 MG capsule, TAKE ONE CAPSULE BY MOUTH EVERY 8 HOURS WITH FOOD, Disp: 90 capsule, Rfl: 2  •  HYDROcodone-acetaminophen (Norco) 7.5-325 MG per tablet, Take 1 tablet by mouth Every 8 (Eight) Hours As Needed for Severe Pain ., Disp: 9 tablet, Rfl: 0  •  prednisoLONE acetate (PRED FORTE) 1 % ophthalmic suspension, , Disp: , Rfl:     Current Outpatient Medications on File Prior to Visit   Medication Sig Dispense Refill   • atorvastatin (LIPITOR) 10 MG tablet TAKE ONE TABLET BY MOUTH DAILY 90 tablet 4   • CALCIUM PO Take 1 tablet by mouth Daily.     • cyclobenzaprine (FLEXERIL) 10 MG tablet TAKE ONE TABLET BY MOUTH THREE TIMES A DAY 90 tablet 0   • etodolac (LODINE) 200 MG capsule TAKE ONE CAPSULE BY MOUTH EVERY 8 HOURS WITH FOOD 90 capsule 2   • HYDROcodone-acetaminophen (Norco) 7.5-325 MG per tablet Take 1 tablet by mouth Every 8 (Eight) Hours As Needed for Severe Pain . 9 tablet 0   • prednisoLONE acetate (PRED FORTE) 1 % ophthalmic suspension        No current facility-administered medications on file prior to visit.       Patient Active Problem List   Diagnosis   • Hyperlipidemia LDL goal <130   • SI joint arthritis   • Arthritis   • Encounter for long-term (current) drug use   • H/O hysterectomy for benign disease   • Menopause   • Dizziness   • Chronic left-sided low back pain with left-sided sciatica       Past Medical History:   Diagnosis Date   • Abnormal skin growth 07/17/2017   • Arthritis    • Cancer (HCC) 2010    Melanoma: L FA    • History of bone density study 2008   • History of mammogram 11/01/2013   • History of mammogram 09/23/2011   • Hyperlipidemia        Past Surgical History:   Procedure Laterality Date   • COLONOSCOPY  2006   • HYSTERECTOMY     • SKIN SURGERY  2000    L FA - melanoma, Dr Caban        Family History   Problem Relation Age of Onset   • Heart disease  Mother    • Heart disease Father         Lived until he was 102 yoa    • Cancer Sister    • Stroke Sister    • Breast cancer Sister    • No Known Problems Brother    • No Known Problems Daughter    • No Known Problems Son    • No Known Problems Maternal Grandmother    • No Known Problems Paternal Grandmother    • No Known Problems Maternal Aunt    • No Known Problems Paternal Aunt    • BRCA 1/2 Neg Hx    • Colon cancer Neg Hx    • Endometrial cancer Neg Hx    • Ovarian cancer Neg Hx        Social History     Socioeconomic History   • Marital status:    Tobacco Use   • Smoking status: Never Smoker   • Smokeless tobacco: Never Used   Vaping Use   • Vaping Use: Never used   Substance and Sexual Activity   • Alcohol use: No   • Drug use: No   • Sexual activity: Defer       -------        REVIEW OF PERTINENT MEDICAL DATA    E-judd report is reviewed:  I reviewed the document in the electronic form under the PDMP tab in the Epic EMR...  - In this function, the report is a current report in as close to real-time as possible.  - The report was available for immediate review.    - I did not kendy the report as reviewed.  - There is not concern for aberrant behavior based on this ekasper review.    March 14,2022 CBC had a platelet count of 434,000    Reviewed May 26, 2022 note from [facility] wound care.  Her wound on the leg and epithelializing was fully closed and she was discharged on that day.  Recommended some occasional greasy lotions like Vaseline or Aquaphor.    Reviewed MRI report from [facility] from April 23, 2022.  Some anterolisthesis 3 mm at L4-5 and severe facet disease at L4-5 and there was severe left-sided neuroforaminal narrowing at L4-5 on the left.  There is also some moderate left foraminal narrowing at L3-4 as well as L2-3 and L1-2.  Mild foraminal narrowing L5S1    Office visit from April 14, 2022 with LALITHA Dover.  Chronic pain worsening after  "admission to the hospital for vertigo.  Does note the following with Ortho and the sacroiliac injections.  Using some hydrocodone at the time of some Flexeril.  Plan included etodolac and pain management referral.        Review of Systems   Constitutional: Positive for activity change (dec) and fatigue (occ). Negative for fever.   HENT: Negative for congestion.    Eyes: Negative for visual disturbance.   Respiratory: Negative for cough and shortness of breath.    Cardiovascular: Negative for chest pain.   Gastrointestinal: Negative for constipation and diarrhea.   Genitourinary: Negative for difficulty urinating.   Musculoskeletal: Positive for arthralgias (left leg, with sciatica), back pain and gait problem (walker).   Neurological: Positive for weakness (left leg). Negative for dizziness, light-headedness, numbness and headaches.   Psychiatric/Behavioral: Negative for agitation, sleep disturbance and suicidal ideas. The patient is not nervous/anxious.          Vitals:    08/01/22 1339   BP: 158/89   Pulse: 91   Resp: 20   Temp: 97.7 °F (36.5 °C)   SpO2: 97%   Weight: 84.4 kg (186 lb)   Height: 162.6 cm (64\")   PainSc:   4   PainLoc: Back         Objective   Physical Exam  Vitals and nursing note reviewed.   Constitutional:       General: She is not in acute distress.     Appearance: Normal appearance. She is well-developed. She is not toxic-appearing.   HENT:      Head: Normocephalic and atraumatic.      Right Ear: Hearing and external ear normal.      Left Ear: Hearing and external ear normal.      Nose: Nose normal.   Eyes:      General: Lids are normal.      Conjunctiva/sclera: Conjunctivae normal.      Pupils: Pupils are equal, round, and reactive to light.   Pulmonary:      Effort: Pulmonary effort is normal. No respiratory distress.   Musculoskeletal:      Lumbar back: Tenderness and bony tenderness present. Decreased range of motion. Positive left straight leg raise test. Negative right straight leg raise " test.      Right lower leg: Swelling and laceration present.      Left lower leg: Swelling present.      Comments: There was some mild increase in pain and lumbar loading therefore positive but the straight leg raising maneuver was positive also in more accurately reproducing the totality of her pain.  Equivocal Harsha.   Neurological:      Mental Status: She is alert and oriented to person, place, and time.      Cranial Nerves: Cranial nerves are intact. No cranial nerve deficit.      Sensory: Sensation is intact.      Motor: No weakness.      Coordination: Coordination is intact.      Gait: Gait is intact.      Deep Tendon Reflexes:      Reflex Scores:       Patellar reflexes are 1+ on the right side and 1+ on the left side.       Achilles reflexes are 1+ on the right side and 0 on the left side.  Psychiatric:         Behavior: Behavior normal.         Assessment & Plan   Diagnoses and all orders for this visit:    1. Chronic pain syndrome (Primary)    2. 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  There is some frequent flareups of pain that may be worsening and also perception of weakness in the left lower extremity with painful flareups, and with the known spinal stenosis her prognosis is uncertain.  She has significant facet arthritis contributing to the foraminal stenosis.  Some of her backs of back pain may be related to the internal derangement spinal column but also could be related to the arthritic facet itself and diagnostic injections are necessary to try to discern the pathologies.    We spent a great deal of time talking about the anatomy and the imaging findings and talking about the difference between stenosis related pain and foraminal stenosis associated  radicular pain as opposed to facet mediated pain.  We talked about the interventional pain plans of care that are different for those unique pathologies.  With the fact she has back pain in the leg symptoms I think the targeted transforaminal injection is the most reasonable first step.    --- Follow-up for procedure... Left L4 LTFESI (+/- L3)    -- could consider as a second step an interlaminar LESI likely L45 if sciatica persists    -- could consider LMBB / RF if axial component of pain persists especially due to severe L45 arthritis    -- has pending consult to establish with [facility] [place]. St and consider whether there are reasonable surgical indications or not    ----    Given lumbar radiculopathy follows the left L4/L5 dermatome distrubution, patient would likely benefit from a left sided transforaminal epidural injection.  The procedure was described in detail and the risks, benefits and alternatives were discussed with the patient (including but not limited to: bleeding, infection, nerve damage, worsening of pain, inability to perform injection, paralysis, seizures, and death) who agreed to proceed.       Reviewed the procedure at length with the patient.  Included in the review was expectations, complications, risk and benefits.The procedure was described in detail and the risks, benefits and alternatives were discussed with the patient (including but not limited to: bleeding, infection, nerve damage, worsening of pain, inability to perform injection, paralysis, seizures, coma, no pain relief and death) who agreed to proceed.  Discussed the potential for sedation if warranted/wanted.  The procedure will plan to be performed at [facility] with fluoroscopic guidance(unless ultrasound is indicated) and could potentially have steroids and contrast dye used. Questions were answered and in a way the patient could understand.  Patient verbalized understanding and wishes to proceed.  " This intervention will be ordered.  Discussed with patient that all procedures are part of a multimodal plan of care and include either formal PT or a home exercise program.  Patient has no evidence of coagulopathy or current infection.    Discussed with the patient that sedation is optional for this procedure.  The sedation offered is called conscious sedation which is different from general anesthesia that is utilized in surgical procedures. The dosing of the sedation is determined by the physician and they will be monitored throughout the procedure. With conscious sedation it is possible to remember parts or all of the procedure, this is normal. They will need to have a  with them as driving is prohibited following conscious sedation.     NPO instructions for conscious sedation:  --- Do not eat 6 hours prior to the procedure.   --- Do not drink any dairy or citrus 4 hours prior to the procedure.   --- Do not drink anything, including clear liquids, 2 hours prior to procedure.     If the NPO instructions are not followed then the procedure may be performed without sedation or the procedure will need to be rescheduled.       ---    -------  Education about Medial Branch Blockade and RF Therapy:    This medial branch blockade (MBB) suggested is intended for diagnostic purposes, with the intent of offering the patient Radiofrequency thermal rhizotomy (RF) if the MBB is diagnostically effective.  The diagnostic blockade is necessary to determine the likelihood that RF therapy could be efficacious in providing long term relief to the patient.    Medial branches are sensory nerve branches that connect to a facet joint and transmit sensations & pain signals from that joint.  Facet is a term for the type of joints found in the spine.  Medial branches are the nerves that go to a facet, and therefore are also sometimes called \"facet joint nerves\" (FJNs).      In a medial branch blockade procedure, xray fluoroscopy is " used to verify the locations of the outside of the joint lines which are being targeted.  Under xray guidance, needles are placed to these areas.  Contrast dye is injected to confirm proper placement, with dye flowing over the joint area, and to ensure that the dye does not flow into unintended areas such as a vein.  When this is confirmed, local anesthetic is injected to block the medial branch at that joint level.      If MBBs are diagnostically successful in blocking pain, then the patient is most likely a great candidate for Radiofrequency of those facet joint nerves.  In the RF procedure, needles are placed to the joint lines in the same fashion, and after testing, the needle tips are heated to thermally treat the nerves, blocking the nerves by in essence damaging the nerves with the heat treatment(non-pulsed).       Medically, a successful RF procedure should provide a patient with 50% pain relief or more for at least 6 months.  Clinical experience suggests that successful patients receive relief more in the range of 12 months on average.  We also discussed that a fortunate minority of patients receive therapeutic success from the MBB, and may not require RF ablation.  If a patient receives more than 8 weeks of relief from MBB, then occasional repeat MBB for therapeutic purposes is a very reasonable alternative therapy.  This course of therapy is consistent with our LCDs according to our CMS  in the area, and therefore other insurance providers should follow accordingly.  We will monitor our patients to screen for these therapeutic responders and will offer RF therapy only when necessary.        We discussed that MBB & RF are not without risks.  Guidelines regarding anticoagulant use & neuraxial procedures will be respected.  Patients that are ill or otherwise may be at risk for sepsis will not have their spines accessed by neuraxial injections of any type.  This patient will not be offered these  therapies if there is an increased risk.   We discussed that there is a risk of postprocedural pain and also a risk of worsening of clinical picture with these procedures as with any neuraxial procedure.    -------      ---  Indications for epidural injection:  Plan is to proceed with epidural at the appropriate level.  If the patient receives significant pain reduction and improvement in function and the plan will be to repeat the epidural when the pain worsens.  If a second epidural provides at least 6 weeks of sustained improvement that includes both pain reduction and improvement in function then an epidural injection could be repeated once again at the same level.  This is a mutual decision between the clinician and the patient that includes discussions including risks and benefits in detail as well as alternative therapies.  Patient's questions were answered to their satisfaction and to their understanding.  ---        ---           ELE REPORT  ELE report has been reviewed and scanned into the patient's chart.  Date of last ELE : as above.  No current use of opioids.           Dictated utilizing Dragon dictation.     This document is intended for medical expert use only. Reading of this document by patients and/or patient's family without participating medical staff guidance may result in misinterpretation and unintended morbidity.   Any interpretation of such data is the responsibility of the patient and/or family member responsible for the patient in concert with their primary or specialist providers, not to be left for sources of online searches such as [employer], [employer] or similar queries. Relying on these approaches to knowledge may result in misinterpretation, misguided goals of care and even death should patients or family members try recommendations outside of the realm of professional medical care in a supervised way.  ---    Vitals:    08/01/22 1339   PainSc:   4   PainLoc: Back          Shanique S  Juan reports a pain score of 4.  Given her pain assessment as noted, treatment options were discussed and the following options were decided upon as a follow-up plan to address the patient's pain: educational materials on pain management and steroid injections.

## 2022-08-01 NOTE — PATIENT INSTRUCTIONS
Discussed with the patient that sedation is optional for this procedure.  The sedation offered is called conscious sedation which is different from general anesthesia that is utilized in surgical procedures. The dosing of the sedation is determined by the physician and they will be monitored throughout the procedure. With conscious sedation it is possible to remember parts or all of the procedure, this is normal. They will need to have a  with them as driving is prohibited following conscious sedation.     NPO instructions for conscious sedation:  --- Do not eat 6 hours prior to the procedure.   --- Do not drink any dairy or citrus 4 hours prior to the procedure.   --- Do not drink anything, including clear liquids, 2 hours prior to procedure.     If the NPO instructions are not followed then the procedure may be performed without sedation or the procedure will need to be rescheduled.

## 2022-08-02 ENCOUNTER — OFFICE VISIT (OUTPATIENT)
Dept: NEUROSURGERY | Facility: CLINIC | Age: 87
End: 2022-08-02

## 2022-08-02 VITALS
HEART RATE: 89 BPM | DIASTOLIC BLOOD PRESSURE: 80 MMHG | SYSTOLIC BLOOD PRESSURE: 130 MMHG | OXYGEN SATURATION: 97 % | BODY MASS INDEX: 31.76 KG/M2 | HEIGHT: 64 IN | TEMPERATURE: 97.6 F | WEIGHT: 186 LBS

## 2022-08-02 DIAGNOSIS — G89.29 CHRONIC LEFT-SIDED LOW BACK PAIN WITH LEFT-SIDED SCIATICA: Primary | ICD-10-CM

## 2022-08-02 DIAGNOSIS — M54.42 CHRONIC LEFT-SIDED LOW BACK PAIN WITH LEFT-SIDED SCIATICA: Primary | ICD-10-CM

## 2022-08-02 PROCEDURE — 99203 OFFICE O/P NEW LOW 30 MIN: CPT | Performed by: NEUROLOGICAL SURGERY

## 2022-08-02 RX ORDER — DICLOFENAC SODIUM 30 MG/G
GEL TOPICAL 2 TIMES DAILY
Status: ON HOLD | COMMUNITY
End: 2023-01-05

## 2022-08-02 NOTE — PROGRESS NOTES
"Subjective   Patient ID: Shanique Martinez is a 86 y.o. female is being seen for consultation today at the request of Chloe Jenkins PA-C for DDD lumbar. Patient had a MRI Lumbar on 04/23/2022 at [facility].     Treatment: PT    Today patient states that she has low back pain that radiates to the L leg. Patient denies B.B incontinence.     Patient, Provider, and MA are all wearing a mask in our office today.     History of Present Illness     This patient has been having pain in her back with radiation down her left leg.  She has a long history of pain in her back but about 3 months ago she was hospitalized for dizziness and after that hospitalization developed pain in her left leg.  She had never had trouble with her leg before.  She has had intermittent epidurals for years which generally control her pain.  She has not had any epidural since this flared up.  She has no difficulty with bowel bladder control or other associated symptoms.  She is scheduled to see pain management later this month.    The following portions of the patient's history were reviewed and updated as appropriate: allergies, current medications, past family history, past medical history, past social history, past surgical history and problem list.    Review of Systems   Constitutional: Negative for chills and fever.   HENT: Negative for congestion.    Genitourinary: Negative for difficulty urinating and dysuria.   Musculoskeletal: Positive for back pain, gait problem and myalgias.        L leg pain   Neurological: Positive for weakness.        L leg weakness.        I have reviewed the review of systems as documented by my MA.      Objective     Vitals:    08/02/22 1213   BP: 130/80   Cuff Size: Adult   Pulse: 89   Temp: 97.6 °F (36.4 °C)   SpO2: 97%   Weight: 84.4 kg (186 lb)   Height: 162.6 cm (64\")     Body mass index is 31.93 kg/m².      Physical Exam  Constitutional:       Appearance: She is well-developed.   HENT:      Head: " Normocephalic and atraumatic.   Eyes:      Extraocular Movements: EOM normal.      Conjunctiva/sclera: Conjunctivae normal.      Pupils: Pupils are equal, round, and reactive to light.   Neck:      Vascular: No carotid bruit.   Neurological:      Mental Status: She is oriented to person, place, and time.      Coordination: Finger-Nose-Finger Test and Heel to Shin Test normal.      Gait: Gait is intact.      Deep Tendon Reflexes:      Reflex Scores:       Tricep reflexes are 2+ on the right side and 2+ on the left side.       Bicep reflexes are 2+ on the right side and 2+ on the left side.       Brachioradialis reflexes are 2+ on the right side and 2+ on the left side.       Patellar reflexes are 2+ on the right side and 2+ on the left side.       Achilles reflexes are 2+ on the right side and 2+ on the left side.  Psychiatric:         Speech: Speech normal.       Neurologic Exam     Mental Status   Oriented to person, place, and time.   Registration of memory: Good recent and remote memory.   Attention: normal. Concentration: normal.   Speech: speech is normal   Level of consciousness: alert  Knowledge: consistent with education.     Cranial Nerves     CN II   Visual fields full to confrontation.   Visual acuity: normal    CN III, IV, VI   Pupils are equal, round, and reactive to light.  Extraocular motions are normal.     CN V   Facial sensation intact.   Right corneal reflex: normal  Left corneal reflex: normal    CN VII   Facial expression full, symmetric.   Right facial weakness: none  Left facial weakness: none    CN VIII   Hearing: intact    CN IX, X   Palate: symmetric    CN XI   Right sternocleidomastoid strength: normal  Left sternocleidomastoid strength: normal    CN XII   Tongue: not atrophic  Tongue deviation: none    Motor Exam   Muscle bulk: normal  Right arm tone: normal  Left arm tone: normal  Right leg tone: normal  Left leg tone: normal    Strength   Strength 5/5 except as noted.     Sensory Exam    Light touch normal.     Gait, Coordination, and Reflexes     Gait  Gait: normal    Coordination   Finger to nose coordination: normal  Heel to shin coordination: normal    Reflexes   Right brachioradialis: 2+  Left brachioradialis: 2+  Right biceps: 2+  Left biceps: 2+  Right triceps: 2+  Left triceps: 2+  Right patellar: 2+  Left patellar: 2+  Right achilles: 2+  Left achilles: 2+  Right : 2+  Left : 2+          Assessment & Plan   Independent Review of Radiographic Studies:      I personally reviewed the images from the following studies.    I reviewed an MRI of the lumbar spine done on 23 April of this year.  This does show a lumbar scoliosis.  There does appear to be degenerative change and disc bulging at every level of the lumbar spine.  There is a spondylolisthesis of L4 on L5 of a few millimeters.  On the axial images T12-L1 is fairly open.  L1-2 shows severe left-sided lateral recess stenosis with significant intrusion into the spinal canal.  This is also true at L2-3.  L3-4 is stenotic on both sides although the right side at that level seems to be a little worse than the left.  L4-5 shows bilateral narrowing also and L5-S1 looks okay.    Medical Decision Making:      I told the patient and her daughter about the imaging.  I told her that from my point of view I would tend to try and avoid surgery at 86 years old if at all possible.  She agrees.  I suggested some epidural blocks and some physical therapy.  She is to call if she is getting worse in the future.    Diagnoses and all orders for this visit:    1. Chronic left-sided low back pain with left-sided sciatica (Primary)  -     Ambulatory Referral to Physical Therapy      Return if symptoms worsen or fail to improve.

## 2022-08-10 ENCOUNTER — OFFICE VISIT (OUTPATIENT)
Dept: INTERNAL MEDICINE | Facility: CLINIC | Age: 87
End: 2022-08-10

## 2022-08-10 VITALS
OXYGEN SATURATION: 95 % | HEIGHT: 64 IN | BODY MASS INDEX: 31.76 KG/M2 | TEMPERATURE: 97.9 F | HEART RATE: 84 BPM | WEIGHT: 186 LBS | DIASTOLIC BLOOD PRESSURE: 84 MMHG | SYSTOLIC BLOOD PRESSURE: 120 MMHG

## 2022-08-10 DIAGNOSIS — M54.42 CHRONIC LEFT-SIDED LOW BACK PAIN WITH LEFT-SIDED SCIATICA: ICD-10-CM

## 2022-08-10 DIAGNOSIS — I87.303 STASIS EDEMA OF BOTH LOWER EXTREMITIES: ICD-10-CM

## 2022-08-10 DIAGNOSIS — E78.5 HYPERLIPIDEMIA LDL GOAL <130: Chronic | ICD-10-CM

## 2022-08-10 DIAGNOSIS — R26.9 GAIT DISTURBANCE: ICD-10-CM

## 2022-08-10 DIAGNOSIS — L03.116 CELLULITIS OF LEFT LOWER EXTREMITY: Primary | ICD-10-CM

## 2022-08-10 DIAGNOSIS — G89.29 CHRONIC LEFT-SIDED LOW BACK PAIN WITH LEFT-SIDED SCIATICA: ICD-10-CM

## 2022-08-10 PROCEDURE — 99214 OFFICE O/P EST MOD 30 MIN: CPT | Performed by: NURSE PRACTITIONER

## 2022-08-10 RX ORDER — POTASSIUM CHLORIDE 750 MG/1
10 TABLET, EXTENDED RELEASE ORAL DAILY
Qty: 30 TABLET | Refills: 5 | Status: SHIPPED | OUTPATIENT
Start: 2022-08-10 | End: 2023-03-09

## 2022-08-10 RX ORDER — FUROSEMIDE 20 MG/1
20 TABLET ORAL EVERY MORNING
Qty: 30 TABLET | Refills: 5 | Status: SHIPPED | OUTPATIENT
Start: 2022-08-10 | End: 2023-03-09

## 2022-08-10 RX ORDER — DOXYCYCLINE HYCLATE 100 MG/1
100 CAPSULE ORAL 2 TIMES DAILY
Qty: 14 CAPSULE | Refills: 0 | Status: SHIPPED | OUTPATIENT
Start: 2022-08-10 | End: 2022-08-17

## 2022-08-10 NOTE — PROGRESS NOTES
Chief Complaint  Hyperlipidemia (6 month follow up )     Subjective:      History of Present Illness {CC  Problem List  Visit  Diagnosis   Encounters  Notes  Medications  Labs  Result Review Imaging  Media :23}     Shanique Martinez presents to [facility] for:      Daughter is with her today.     She has seen Dr St for DDD lumbar.   Pain radiating to left leg.  Preferred to not do surgery due to age.    8/2/22: referred to PT and plan for epidural blocks.   She has been to see to [facility] and has follow up next week.     With DDD - when walking with current walker.  Will get tired and needs to sit down.  Would like to have rollator with seat.  Agree that this is safer option for her.     Wounds: she has seen wound care.    R lower extremity had been improving.  She then sat chair down on left foot and now has a wound.  No fever or chills.  Chronic venous stasis.   Daughter states they won't need referral to go back to wound care.     Hyperlipidemia: continues statin without myalgia         I have reviewed patient's medical history, any new submitted information provided by patient or medical assistant and updated medical record.      Objective:      Physical Exam  Vitals reviewed.   Constitutional:       Appearance: Normal appearance. She is well-developed.   HENT:      Head:      Comments: Wearing mask due to COVID   Neck:      Thyroid: No thyromegaly.   Cardiovascular:      Rate and Rhythm: Normal rate and regular rhythm.      Pulses: Normal pulses.      Heart sounds: Normal heart sounds.   Pulmonary:      Effort: Pulmonary effort is normal.      Breath sounds: Normal breath sounds.      Comments: E/U   Musculoskeletal:      Cervical back: Normal range of motion and neck supple.      Right lower leg: Edema present.      Left lower leg: Edema present.        Feet:    Lymphadenopathy:      Cervical: No cervical adenopathy.   Skin:     General: Skin is warm and dry.     "  Capillary Refill: Capillary refill takes 2 to 3 seconds.   Neurological:      Mental Status: She is alert and oriented to person, place, and time.   Psychiatric:         Mood and Affect: Mood normal.         Behavior: Behavior normal. Behavior is cooperative.         Thought Content: Thought content normal.         Judgment: Judgment normal.        Result Review  Data Reviewed:{ Labs  Result Review  Imaging  Med Tab  Media :23}     The following data was reviewed by: Alvarez Ron III, NP-C on 08/10/2022  Common labs    Common Labsle 3/2/22 3/2/22 3/13/22 3/13/22 3/14/22 3/14/22 3/14/22    1402 1402 1054 1054 0509 0509 0807   Glucose  101 (A)  120 (A)  87    BUN  24  19  16    Creatinine  0.79  0.76  0.69    Sodium  140  139  138    Potassium  4.4  3.8  3.8 3.9   Chloride  101  102  102    Calcium  9.8  9.4  9.6    Total Protein  6.6        Albumin  4.0  4.00      Total Bilirubin  0.3  0.4      Alkaline Phosphatase  96  105      AST (SGOT)  24  27      ALT (SGPT)  18  18      WBC 9.7  10.33  8.63     Hemoglobin 12.7  13.5  13.2     Hematocrit 37.8  39.7  38.5     Platelets 407  412  434     (A) Abnormal value       Comments are available for some flowsheets but are not being displayed.                  Vital Signs:   /84 (BP Location: Left arm, Patient Position: Sitting, Cuff Size: Adult)   Pulse 84   Temp 97.9 °F (36.6 °C) (Temporal)   Ht 162.6 cm (64\")   Wt 84.4 kg (186 lb)   SpO2 95%   BMI 31.93 kg/m²         Requested Prescriptions     Signed Prescriptions Disp Refills   • doxycycline (VIBRAMYCIN) 100 MG capsule 14 capsule 0     Sig: Take 1 capsule by mouth 2 (Two) Times a Day for 7 days.   • furosemide (Lasix) 20 MG tablet 30 tablet 5     Sig: Take 1 tablet by mouth Every Morning.   • potassium chloride (K-DUR,KLOR-CON) 10 MEQ CR tablet 30 tablet 5     Sig: Take 1 tablet by mouth Daily.       Routine medications provided by this office will also be refilled via pharmacy request. "       Current Outpatient Medications:   •  atorvastatin (LIPITOR) 10 MG tablet, TAKE ONE TABLET BY MOUTH DAILY, Disp: 90 tablet, Rfl: 4  •  CALCIUM PO, Take 1 tablet by mouth Daily., Disp: , Rfl:   •  cyclobenzaprine (FLEXERIL) 10 MG tablet, TAKE ONE TABLET BY MOUTH THREE TIMES A DAY, Disp: 90 tablet, Rfl: 0  •  Diclofenac Sodium 3 % gel gel, Apply  topically to the appropriate area as directed 2 (Two) Times a Day. for 60 days., Disp: , Rfl:   •  etodolac (LODINE) 200 MG capsule, TAKE ONE CAPSULE BY MOUTH EVERY 8 HOURS WITH FOOD, Disp: 90 capsule, Rfl: 2  •  prednisoLONE acetate (PRED FORTE) 1 % ophthalmic suspension, , Disp: , Rfl:   •  doxycycline (VIBRAMYCIN) 100 MG capsule, Take 1 capsule by mouth 2 (Two) Times a Day for 7 days., Disp: 14 capsule, Rfl: 0  •  furosemide (Lasix) 20 MG tablet, Take 1 tablet by mouth Every Morning., Disp: 30 tablet, Rfl: 5  •  potassium chloride (K-DUR,KLOR-CON) 10 MEQ CR tablet, Take 1 tablet by mouth Daily., Disp: 30 tablet, Rfl: 5     Assessment and Plan:      Assessment and Plan {CC Problem List  Visit Diagnosis  ROS  Review (Popup)  Health Maintenance  Quality  BestPractice  Medications  SmartSets  SnapShot Encounters  Media :23}     Problem List Items Addressed This Visit        Cardiac and Vasculature    Hyperlipidemia LDL goal <130 (Chronic)    Overview     Current medication: lipitor 10 mg daily          Current Assessment & Plan     Lipid abnormalities are improving with treatment.  Pharmacotherapy as ordered.  Lipids will be reassessed in 6 months.    Lab Results   Component Value Date    CHOL 175 05/03/2017    CHLPL 189 02/09/2022    TRIG 65 02/09/2022    HDL 74 02/09/2022     (H) 02/09/2022            Stasis edema of both lower extremities (Chronic)    Relevant Medications    furosemide (Lasix) 20 MG tablet    potassium chloride (K-DUR,KLOR-CON) 10 MEQ CR tablet       Musculoskeletal and Injuries    Chronic left-sided low back pain with left-sided  sciatica (Chronic)    Current Assessment & Plan     Follow up with pain management and PT           Other Visit Diagnoses     Cellulitis of left lower extremity    -  Primary    Relevant Medications    doxycycline (VIBRAMYCIN) 100 MG capsule    Gait disturbance            They will let me know if wound not improving over next 7 days - will need to return to wound care.     Elevated lower extremities is important.  Currently sleeping in recliner which is not giving her the elevation she needs.     Once wounds healed, she needs to wear knee high compression socks daily.     Follow Up {Instructions Charge Capture  Follow-up Communications :23}     Return in about 3 months (around 11/10/2022) for started lasix .      Patient was given instructions and counseling regarding her condition or for health maintenance advice. Please see specific information pulled into the AVS if appropriate.    Dragon disclaimer:   Much of this encounter note is an electronic transcription/translation of spoken language to printed text. The electronic translation of spoken language may permit erroneous, or at times, nonsensical words or phrases to be inadvertently transcribed; Although I have reviewed the note for such errors, some may still exist.     Additional Patient Counseling:       There are no Patient Instructions on file for this visit.

## 2022-08-10 NOTE — ASSESSMENT & PLAN NOTE
Lipid abnormalities are improving with treatment.  Pharmacotherapy as ordered.  Lipids will be reassessed in 6 months.    Lab Results   Component Value Date    CHOL 175 05/03/2017    CHLPL 189 02/09/2022    TRIG 65 02/09/2022    HDL 74 02/09/2022     (H) 02/09/2022

## 2022-08-11 NOTE — SIGNIFICANT NOTE
Patient educated on the following :    - If you are receiving Sedation for your procedure Nothing to Eat 6 hours and only clear liquids for 2 hours prior to your procedure.    -Your required COVID Test is Scheduled on  Between the Hours of 6485-8517  -You will only be notified if your COVID test Result is POSITIVE  -The importance of reducing your number of contacts by self quarantining after you COVID test until the date of your PROCEDURE  -You will need to have someone drive you home after your PROCEDURE and remain with you for 24 hours after the PROCEDURE  - The date of your procedure, your are welcome to have one visitor at bedside or remain within 10-15 minutes of [facility]  -You will need to arrive at 15;00 on 8/16 PROCEDURE  -Please contact [employer] [employer] [facility] at: [phone] with any questions and/or concerns

## 2022-08-15 RX ORDER — CYCLOBENZAPRINE HCL 10 MG
TABLET ORAL
Qty: 90 TABLET | Refills: 0 | Status: SHIPPED | OUTPATIENT
Start: 2022-08-15 | End: 2022-09-29 | Stop reason: SDUPTHER

## 2022-08-16 ENCOUNTER — HOSPITAL ENCOUNTER (OUTPATIENT)
Dept: GENERAL RADIOLOGY | Facility: SURGERY CENTER | Age: 87
Setting detail: HOSPITAL OUTPATIENT SURGERY
End: 2022-08-16

## 2022-08-16 ENCOUNTER — HOSPITAL ENCOUNTER (OUTPATIENT)
Facility: SURGERY CENTER | Age: 87
Setting detail: HOSPITAL OUTPATIENT SURGERY
Discharge: HOME | End: 2022-08-16
Attending: ANESTHESIOLOGY | Admitting: ANESTHESIOLOGY

## 2022-08-16 VITALS
HEART RATE: 92 BPM | HEIGHT: 64 IN | TEMPERATURE: 98.2 F | SYSTOLIC BLOOD PRESSURE: 149 MMHG | OXYGEN SATURATION: 99 % | RESPIRATION RATE: 17 BRPM | BODY MASS INDEX: 31.76 KG/M2 | WEIGHT: 186 LBS | DIASTOLIC BLOOD PRESSURE: 74 MMHG

## 2022-08-16 DIAGNOSIS — Z41.9 SURGERY, ELECTIVE: ICD-10-CM

## 2022-08-16 PROCEDURE — 77002 NEEDLE LOCALIZATION BY XRAY: CPT

## 2022-08-22 ENCOUNTER — LAB (OUTPATIENT)
Dept: LAB | Facility: HOSPITAL | Age: 87
End: 2022-08-22

## 2022-08-22 ENCOUNTER — TRANSCRIBE ORDERS (OUTPATIENT)
Dept: ADMINISTRATIVE | Facility: HOSPITAL | Age: 87
End: 2022-08-22

## 2022-08-22 ENCOUNTER — LAB REQUISITION (OUTPATIENT)
Dept: LAB | Facility: HOSPITAL | Age: 87
End: 2022-08-22

## 2022-08-22 ENCOUNTER — HOSPITAL ENCOUNTER (OUTPATIENT)
Dept: GENERAL RADIOLOGY | Facility: HOSPITAL | Age: 87
Discharge: HOME | End: 2022-08-22

## 2022-08-22 ENCOUNTER — OFFICE VISIT (OUTPATIENT)
Dept: WOUND CARE | Facility: HOSPITAL | Age: 87
End: 2022-08-22

## 2022-08-22 DIAGNOSIS — S80.812A ABRASION OF LEFT LEG, INITIAL ENCOUNTER: ICD-10-CM

## 2022-08-22 DIAGNOSIS — S80.811A INFECTED ABRASION OF RIGHT LEG, INITIAL ENCOUNTER: ICD-10-CM

## 2022-08-22 DIAGNOSIS — L08.9 INFECTED ABRASION OF RIGHT LEG, INITIAL ENCOUNTER: ICD-10-CM

## 2022-08-22 DIAGNOSIS — I87.313 IDIOPATHIC CHRONIC VENOUS HYPERTENSION OF BOTH LOWER EXTREMITIES WITH ULCER: ICD-10-CM

## 2022-08-22 DIAGNOSIS — Z00.00 ENCOUNTER FOR GENERAL ADULT MEDICAL EXAMINATION WITHOUT ABNORMAL FINDINGS: ICD-10-CM

## 2022-08-22 DIAGNOSIS — L08.9 ABRASION OF FOOT, LEFT, INFECTED, INITIAL ENCOUNTER: ICD-10-CM

## 2022-08-22 DIAGNOSIS — L97.919 IDIOPATHIC CHRONIC VENOUS HYPERTENSION OF BOTH LOWER EXTREMITIES WITH ULCER: ICD-10-CM

## 2022-08-22 DIAGNOSIS — L97.929 IDIOPATHIC CHRONIC VENOUS HYPERTENSION OF BOTH LOWER EXTREMITIES WITH ULCER: ICD-10-CM

## 2022-08-22 DIAGNOSIS — S90.812A ABRASION OF FOOT, LEFT, INFECTED, INITIAL ENCOUNTER: ICD-10-CM

## 2022-08-22 DIAGNOSIS — E08.44 DIABETES MELLITUS DUE TO UNDERLYING CONDITION WITH DIABETIC AMYOTROPHY, UNSPECIFIED WHETHER LONG TERM INSULIN USE: ICD-10-CM

## 2022-08-22 DIAGNOSIS — L97.812 ULCER OF RIGHT PRETIBIAL REGION, WITH FAT LAYER EXPOSED: ICD-10-CM

## 2022-08-22 DIAGNOSIS — S90.812A ABRASION OF LEFT FOOT, INITIAL ENCOUNTER: ICD-10-CM

## 2022-08-22 DIAGNOSIS — L97.812 ULCER OF RIGHT PRETIBIAL REGION, WITH FAT LAYER EXPOSED: Primary | ICD-10-CM

## 2022-08-22 LAB
ALBUMIN SERPL-MCNC: 4.2 G/DL (ref 3.5–5.2)
ALBUMIN/GLOB SERPL: 1.7 G/DL
ALP SERPL-CCNC: 98 U/L (ref 39–117)
ALT SERPL W P-5'-P-CCNC: 14 U/L (ref 1–33)
ANION GAP SERPL CALCULATED.3IONS-SCNC: 9.4 MMOL/L (ref 5–15)
AST SERPL-CCNC: 20 U/L (ref 1–32)
BASOPHILS # BLD AUTO: 0.04 10*3/MM3 (ref 0–0.2)
BASOPHILS NFR BLD AUTO: 0.6 % (ref 0–1.5)
BILIRUB SERPL-MCNC: 0.5 MG/DL (ref 0–1.2)
BUN SERPL-MCNC: 14 MG/DL (ref 8–23)
BUN/CREAT SERPL: 16.5 (ref 7–25)
CALCIUM SPEC-SCNC: 9.8 MG/DL (ref 8.6–10.5)
CHLORIDE SERPL-SCNC: 103 MMOL/L (ref 98–107)
CO2 SERPL-SCNC: 27.6 MMOL/L (ref 22–29)
CREAT SERPL-MCNC: 0.85 MG/DL (ref 0.57–1)
DEPRECATED RDW RBC AUTO: 42.1 FL (ref 37–54)
EGFRCR SERPLBLD CKD-EPI 2021: 67.2 ML/MIN/1.73
EOSINOPHIL # BLD AUTO: 0.08 10*3/MM3 (ref 0–0.4)
EOSINOPHIL NFR BLD AUTO: 1.2 % (ref 0.3–6.2)
ERYTHROCYTE [DISTWIDTH] IN BLOOD BY AUTOMATED COUNT: 13.1 % (ref 12.3–15.4)
GLOBULIN UR ELPH-MCNC: 2.5 GM/DL
GLUCOSE SERPL-MCNC: 100 MG/DL (ref 65–99)
HBA1C MFR BLD: 5.5 % (ref 4.8–5.6)
HCT VFR BLD AUTO: 37.1 % (ref 34–46.6)
HGB BLD-MCNC: 12.7 G/DL (ref 12–15.9)
IMM GRANULOCYTES # BLD AUTO: 0.04 10*3/MM3 (ref 0–0.05)
IMM GRANULOCYTES NFR BLD AUTO: 0.6 % (ref 0–0.5)
LYMPHOCYTES # BLD AUTO: 1.39 10*3/MM3 (ref 0.7–3.1)
LYMPHOCYTES NFR BLD AUTO: 21.7 % (ref 19.6–45.3)
MCH RBC QN AUTO: 30.5 PG (ref 26.6–33)
MCHC RBC AUTO-ENTMCNC: 34.2 G/DL (ref 31.5–35.7)
MCV RBC AUTO: 89.2 FL (ref 79–97)
MONOCYTES # BLD AUTO: 0.42 10*3/MM3 (ref 0.1–0.9)
MONOCYTES NFR BLD AUTO: 6.6 % (ref 5–12)
NEUTROPHILS NFR BLD AUTO: 4.44 10*3/MM3 (ref 1.7–7)
NEUTROPHILS NFR BLD AUTO: 69.3 % (ref 42.7–76)
NRBC BLD AUTO-RTO: 0 /100 WBC (ref 0–0.2)
PLATELET # BLD AUTO: 376 10*3/MM3 (ref 140–450)
PMV BLD AUTO: 8.7 FL (ref 6–12)
POTASSIUM SERPL-SCNC: 4.5 MMOL/L (ref 3.5–5.2)
PREALB SERPL-MCNC: 21.8 MG/DL (ref 20–40)
PROT SERPL-MCNC: 6.7 G/DL (ref 6–8.5)
RBC # BLD AUTO: 4.16 10*6/MM3 (ref 3.77–5.28)
SODIUM SERPL-SCNC: 140 MMOL/L (ref 136–145)
WBC NRBC COR # BLD: 6.41 10*3/MM3 (ref 3.4–10.8)

## 2022-08-22 PROCEDURE — 97602 WOUND(S) CARE NON-SELECTIVE: CPT

## 2022-08-22 PROCEDURE — 83036 HEMOGLOBIN GLYCOSYLATED A1C: CPT

## 2022-08-22 PROCEDURE — 36415 COLL VENOUS BLD VENIPUNCTURE: CPT

## 2022-08-22 PROCEDURE — 73630 X-RAY EXAM OF FOOT: CPT

## 2022-08-22 PROCEDURE — 87075 CULTR BACTERIA EXCEPT BLOOD: CPT | Performed by: NURSE PRACTITIONER

## 2022-08-22 PROCEDURE — 87070 CULTURE OTHR SPECIMN AEROBIC: CPT | Performed by: NURSE PRACTITIONER

## 2022-08-22 PROCEDURE — 80053 COMPREHEN METABOLIC PANEL: CPT

## 2022-08-22 PROCEDURE — 85025 COMPLETE CBC W/AUTO DIFF WBC: CPT

## 2022-08-22 PROCEDURE — 84134 ASSAY OF PREALBUMIN: CPT

## 2022-08-22 PROCEDURE — 87205 SMEAR GRAM STAIN: CPT | Performed by: NURSE PRACTITIONER

## 2022-08-22 PROCEDURE — G0463 HOSPITAL OUTPT CLINIC VISIT: HCPCS

## 2022-08-22 PROCEDURE — 87015 SPECIMEN INFECT AGNT CONCNTJ: CPT | Performed by: NURSE PRACTITIONER

## 2022-08-25 LAB
BACTERIA SPEC AEROBE CULT: NORMAL
GRAM STN SPEC: NORMAL
GRAM STN SPEC: NORMAL

## 2022-08-27 LAB — BACTERIA SPEC ANAEROBE CULT: NORMAL

## 2022-09-02 ENCOUNTER — OFFICE VISIT (OUTPATIENT)
Dept: WOUND CARE | Facility: HOSPITAL | Age: 87
End: 2022-09-02

## 2022-09-16 ENCOUNTER — OFFICE VISIT (OUTPATIENT)
Dept: WOUND CARE | Facility: HOSPITAL | Age: 87
End: 2022-09-16

## 2022-09-28 ENCOUNTER — TELEPHONE (OUTPATIENT)
Dept: PAIN MEDICINE | Facility: CLINIC | Age: 87
End: 2022-09-28

## 2022-09-28 NOTE — TELEPHONE ENCOUNTER
Provider: LITA  Caller: CASSANDRA  Phone Number: [phone] (VOICE MAIL OKAY)  Reason for Call: DAUGHTER IS CALLING TO GET MISS CATHLEEN RESCHEDULED FOR HER LUMBAR/SACRAL TRANSFORAMINAL EPIDURAL.

## 2022-09-29 ENCOUNTER — TELEPHONE (OUTPATIENT)
Dept: INTERNAL MEDICINE | Facility: CLINIC | Age: 87
End: 2022-09-29

## 2022-09-29 DIAGNOSIS — G89.29 CHRONIC LEFT-SIDED LOW BACK PAIN WITH LEFT-SIDED SCIATICA: Primary | ICD-10-CM

## 2022-09-29 DIAGNOSIS — M54.42 CHRONIC LEFT-SIDED LOW BACK PAIN WITH LEFT-SIDED SCIATICA: Primary | ICD-10-CM

## 2022-09-29 RX ORDER — CYCLOBENZAPRINE HCL 10 MG
10 TABLET ORAL 3 TIMES DAILY
Qty: 90 TABLET | Refills: 0 | Status: SHIPPED | OUTPATIENT
Start: 2022-09-29 | End: 2022-11-17

## 2022-09-29 NOTE — TELEPHONE ENCOUNTER
Caller: Shanique Martinez    Relationship: Self    Best call back number: [phone]    Requested Prescriptions:   Requested Prescriptions     Pending Prescriptions Disp Refills   • cyclobenzaprine (FLEXERIL) 10 MG tablet 90 tablet 0     Sig: Take 1 tablet by mouth 3 (Three) Times a Day.        Pharmacy where request should be sent: [facility] [id number] [address] - [phone]  - [phone] FX     Additional details provided by patient:     Does the patient have less than a 3 day supply:  [x] Yes  [x] No    Shira Fabian Rep   09/29/22 09:38 EDT

## 2022-09-30 ENCOUNTER — PREP FOR SURGERY (OUTPATIENT)
Dept: SURGERY | Facility: SURGERY CENTER | Age: 87
End: 2022-09-30

## 2022-09-30 ENCOUNTER — TRANSCRIBE ORDERS (OUTPATIENT)
Dept: SURGERY | Facility: SURGERY CENTER | Age: 87
End: 2022-09-30

## 2022-09-30 ENCOUNTER — TELEPHONE (OUTPATIENT)
Dept: PAIN MEDICINE | Facility: CLINIC | Age: 87
End: 2022-09-30

## 2022-09-30 DIAGNOSIS — Z41.9 SURGERY, ELECTIVE: Primary | ICD-10-CM

## 2022-09-30 DIAGNOSIS — G89.29 CHRONIC LEFT-SIDED LOW BACK PAIN WITH LEFT-SIDED SCIATICA: Primary | ICD-10-CM

## 2022-09-30 DIAGNOSIS — M54.42 CHRONIC LEFT-SIDED LOW BACK PAIN WITH LEFT-SIDED SCIATICA: Primary | ICD-10-CM

## 2022-09-30 RX ORDER — SODIUM CHLORIDE 0.9 % (FLUSH) 0.9 %
10 SYRINGE (ML) INJECTION AS NEEDED
Status: CANCELLED | OUTPATIENT
Start: 2022-09-30

## 2022-09-30 RX ORDER — SODIUM CHLORIDE 0.9 % (FLUSH) 0.9 %
10 SYRINGE (ML) INJECTION EVERY 12 HOURS SCHEDULED
Status: CANCELLED | OUTPATIENT
Start: 2022-09-30

## 2022-09-30 NOTE — TELEPHONE ENCOUNTER
Caller: tomasz devine    Relationship to patient: Emergency Contact    Best call back number:  OR  [phone]    Patient is needing: UNABLE TO WARM TRANSFER.  CALLED TO SCHEDULE PATIENTS EPIDURAL.          ”

## 2022-09-30 NOTE — TELEPHONE ENCOUNTER
Please see. Can you please return Hermila's call to r/s pt LUMBAR/SACRAL TRANSFORAMINAL EPIDURAL - likely Left L4? Hermila verified by  verbal. Thank you.

## 2022-10-07 ENCOUNTER — OFFICE VISIT (OUTPATIENT)
Dept: WOUND CARE | Facility: HOSPITAL | Age: 87
End: 2022-10-07

## 2022-10-11 DIAGNOSIS — G89.29 CHRONIC LEFT-SIDED LOW BACK PAIN WITH LEFT-SIDED SCIATICA: ICD-10-CM

## 2022-10-11 DIAGNOSIS — M54.42 CHRONIC LEFT-SIDED LOW BACK PAIN WITH LEFT-SIDED SCIATICA: ICD-10-CM

## 2022-10-12 RX ORDER — ETODOLAC 200 MG/1
CAPSULE ORAL
Qty: 90 CAPSULE | Refills: 0 | Status: SHIPPED | OUTPATIENT
Start: 2022-10-12 | End: 2022-11-17

## 2022-10-21 ENCOUNTER — OFFICE VISIT (OUTPATIENT)
Dept: WOUND CARE | Facility: HOSPITAL | Age: 87
End: 2022-10-21

## 2022-10-21 PROCEDURE — G0463 HOSPITAL OUTPT CLINIC VISIT: HCPCS

## 2022-10-26 PROCEDURE — S0260 H&P FOR SURGERY: HCPCS | Performed by: ANESTHESIOLOGY

## 2022-10-27 ENCOUNTER — HOSPITAL ENCOUNTER (OUTPATIENT)
Dept: GENERAL RADIOLOGY | Facility: SURGERY CENTER | Age: 87
Setting detail: HOSPITAL OUTPATIENT SURGERY
End: 2022-10-27

## 2022-10-27 ENCOUNTER — HOSPITAL ENCOUNTER (OUTPATIENT)
Facility: SURGERY CENTER | Age: 87
Setting detail: HOSPITAL OUTPATIENT SURGERY
Discharge: HOME | End: 2022-10-27
Attending: ANESTHESIOLOGY | Admitting: ANESTHESIOLOGY

## 2022-10-27 VITALS
DIASTOLIC BLOOD PRESSURE: 82 MMHG | WEIGHT: 190 LBS | TEMPERATURE: 98 F | HEIGHT: 64 IN | HEART RATE: 89 BPM | RESPIRATION RATE: 16 BRPM | SYSTOLIC BLOOD PRESSURE: 146 MMHG | OXYGEN SATURATION: 99 % | BODY MASS INDEX: 32.44 KG/M2

## 2022-10-27 DIAGNOSIS — G89.29 CHRONIC LEFT-SIDED LOW BACK PAIN WITH LEFT-SIDED SCIATICA: ICD-10-CM

## 2022-10-27 DIAGNOSIS — Z41.9 SURGERY, ELECTIVE: ICD-10-CM

## 2022-10-27 DIAGNOSIS — M54.42 CHRONIC LEFT-SIDED LOW BACK PAIN WITH LEFT-SIDED SCIATICA: ICD-10-CM

## 2022-10-27 PROCEDURE — 25010000002 IOPAMIDOL 61 % SOLUTION 30 ML VIAL: Performed by: ANESTHESIOLOGY

## 2022-10-27 PROCEDURE — 25010000002 METHYLPREDNISOLONE PER 80 MG: Performed by: ANESTHESIOLOGY

## 2022-10-27 PROCEDURE — 64483 NJX AA&/STRD TFRM EPI L/S 1: CPT | Performed by: ANESTHESIOLOGY

## 2022-10-27 PROCEDURE — 77002 NEEDLE LOCALIZATION BY XRAY: CPT

## 2022-10-27 PROCEDURE — 76000 FLUOROSCOPY <1 HR PHYS/QHP: CPT

## 2022-10-27 RX ORDER — SODIUM CHLORIDE 0.9 % (FLUSH) 0.9 %
10 SYRINGE (ML) INJECTION AS NEEDED
Status: DISCONTINUED | OUTPATIENT
Start: 2022-10-27 | End: 2022-10-27 | Stop reason: HOSPADM

## 2022-10-27 RX ORDER — SODIUM CHLORIDE 0.9 % (FLUSH) 0.9 %
10 SYRINGE (ML) INJECTION EVERY 12 HOURS SCHEDULED
Status: DISCONTINUED | OUTPATIENT
Start: 2022-10-27 | End: 2022-10-27 | Stop reason: HOSPADM

## 2022-11-11 ENCOUNTER — APPOINTMENT (OUTPATIENT)
Dept: WOUND CARE | Facility: HOSPITAL | Age: 87
End: 2022-11-11

## 2022-11-14 ENCOUNTER — PREP FOR SURGERY (OUTPATIENT)
Dept: SURGERY | Facility: SURGERY CENTER | Age: 87
End: 2022-11-14

## 2022-11-14 ENCOUNTER — OFFICE VISIT (OUTPATIENT)
Dept: WOUND CARE | Facility: HOSPITAL | Age: 87
End: 2022-11-14

## 2022-11-14 ENCOUNTER — OFFICE VISIT (OUTPATIENT)
Dept: PAIN MEDICINE | Facility: CLINIC | Age: 87
End: 2022-11-14

## 2022-11-14 VITALS
HEIGHT: 64 IN | BODY MASS INDEX: 31.96 KG/M2 | DIASTOLIC BLOOD PRESSURE: 75 MMHG | HEART RATE: 96 BPM | TEMPERATURE: 97.5 F | WEIGHT: 187.2 LBS | SYSTOLIC BLOOD PRESSURE: 166 MMHG | OXYGEN SATURATION: 96 %

## 2022-11-14 DIAGNOSIS — M54.16 LEFT LUMBAR RADICULOPATHY: Primary | ICD-10-CM

## 2022-11-14 DIAGNOSIS — G89.4 CHRONIC PAIN SYNDROME: ICD-10-CM

## 2022-11-14 PROCEDURE — 99214 OFFICE O/P EST MOD 30 MIN: CPT

## 2022-11-14 PROCEDURE — G0463 HOSPITAL OUTPT CLINIC VISIT: HCPCS

## 2022-11-14 RX ORDER — SODIUM CHLORIDE 0.9 % (FLUSH) 0.9 %
10 SYRINGE (ML) INJECTION EVERY 12 HOURS SCHEDULED
Status: CANCELLED | OUTPATIENT
Start: 2022-11-14

## 2022-11-14 RX ORDER — SODIUM CHLORIDE 0.9 % (FLUSH) 0.9 %
10 SYRINGE (ML) INJECTION AS NEEDED
Status: CANCELLED | OUTPATIENT
Start: 2022-11-14

## 2022-11-14 NOTE — PROGRESS NOTES
CHIEF COMPLAINT  Back pain    Subjective   Shanique Martinez is a 86 y.o. female  who presents to the office for follow-up of procedure.  She completed a left L4 TF LESI on 10/27/22 performed by Dr. Sharp for management of low back pain. Patient reports 95% relief from the procedure x 2 weeks. Pain started to gradually return on Saturday 11/12/22.     Today pain is 1/10VAS in severity. Pain is located in her low back (L side > R side) and radiates down left lateral/posterior thigh. Describes this pain as an intermittent ache. Pain is worsened by increased physical activity, household chores  that require twisting or prolonged standing, and walking long distances. Pain improves with rest/reposition, use of a TENS unit, and medications. Continues with Etodolac 200mg TID, Tylenol Arthritis PRN and Flexeril 10mg 2-3/day.     Patient saw Dr. Ludwig St on 8/2/2022.  In review of his note he noted that he would recommend that patient try to avoid surgery if at all possible.  He suggested epidural blocks and physical therapy.      Back Pain  This is a chronic problem. The problem occurs intermittently. The problem has been waxing and waning since onset. The pain is present in the lumbar spine (L side > R side). The quality of the pain is described as aching. The pain radiates to the left thigh (left lateral/posterior thigh). The pain is at a severity of 1/10. The symptoms are aggravated by standing and position (increased physical activity, prolonged standing, walking long distances). Associated symptoms include weakness. Pertinent negatives include no abdominal pain, chest pain, dysuria, fever, headaches or numbness. Risk factors include sedentary lifestyle. She has tried muscle relaxant and NSAIDs (rest/reposition, PT in the past, TENS unit, Tylenol, Lodine, Flexeril) for the symptoms.     PEG Assessment   What number best describes your pain on average in the past week?5  What number best describes how, during the  past week, pain has interfered with your enjoyment of life?0  What number best describes how, during the past week, pain has interfered with your general activity?  0    Review of Pertinent Medical Data ---  Reviewed office note from Dr. Jonathan Sharp from 8/1/2022.  Patient presents as a new patient that was referred by LALITHA Toscano for evaluation and treatment of back pain.  Back pain began following hospitalization for vertigo in March 2022.  Patient was referred to [facility] by her PCP where she received 1 lumbar steroid injection in April 2022. No relief noted. Back pain radiates to left sciatica and left leg, denies pain on the right side.  She reports that her right leg feels weak and that she has had some falls this year.  She currently takes Tylenol arthritis, Lodine, and Flexeril as needed for pain.  She receives some relief from a tens unit and a heating pad.  Last MRI of the lumbar spine was updated on 4/23/2022.  Patient has longstanding sacroiliac dysfunction and receives occasional SI joint injections at [place] and [facility] at the [facility] location.  Plan at this appointment was to proceed with a left L4 transforaminal lumbar epidural steroid injection.  Dr. Fitzgerald noted that as a second step we could consider a L4-5 LESI if sciatica pain continues.  Patient could also benefit from a lumbar MBB/RFA if pain persists due to severe L4-5 arthritis.          The following portions of the patient's history were reviewed and updated as appropriate: allergies, current medications, past family history, past medical history, past social history, past surgical history and problem list.    Review of Systems   Constitutional: Negative for activity change (increased), chills, fatigue and fever.   HENT: Negative for congestion.    Eyes: Negative for visual disturbance.   Respiratory: Negative for chest tightness and shortness of breath.    Cardiovascular: Negative for chest pain.  "  Gastrointestinal: Negative for abdominal pain, constipation and diarrhea.   Genitourinary: Negative for difficulty urinating, dyspareunia and dysuria.   Musculoskeletal: Positive for back pain.   Neurological: Positive for weakness. Negative for dizziness, light-headedness, numbness and headaches.   Psychiatric/Behavioral: Negative for agitation and sleep disturbance. The patient is not nervous/anxious.      I have reviewed and confirmed the accuracy of the ROS as documented by the MA/LPN/RN LALITHA Best     Vitals:    11/14/22 1240   BP: 166/75   Pulse: 96   Temp: 97.5 °F (36.4 °C)   SpO2: 96%   Weight: 84.9 kg (187 lb 3.2 oz)   Height: 162.6 cm (64\")   PainSc:   1   PainLoc: Back     Objective   Physical Exam  Constitutional:       Appearance: Normal appearance.   HENT:      Head: Normocephalic.   Cardiovascular:      Rate and Rhythm: Normal rate and regular rhythm.   Pulmonary:      Effort: Pulmonary effort is normal.      Breath sounds: Normal breath sounds.   Musculoskeletal:      Cervical back: Normal range of motion.      Lumbar back: Tenderness (left side) and bony tenderness present. Positive left straight leg raise test. Negative right straight leg raise test.   Skin:     General: Skin is warm and dry.      Capillary Refill: Capillary refill takes less than 2 seconds.   Neurological:      General: No focal deficit present.      Mental Status: She is alert and oriented to person, place, and time.   Psychiatric:         Mood and Affect: Mood normal.         Behavior: Behavior normal.         Thought Content: Thought content normal.         Cognition and Memory: Cognition normal.       Assessment & Plan   Diagnoses and all orders for this visit:    1. Left lumbar radiculopathy (Primary)    2. Chronic pain syndrome    --- L4/L5 LESI - to left of midline   ---  Indications for epidural injection:  Plan is to proceed with epidural at the appropriate level.  If the patient receives significant pain " reduction and improvement in function and the plan will be to repeat the epidural when the pain worsens.  If a second epidural provides at least 6 weeks of sustained improvement that includes both pain reduction and improvement in function then an epidural injection could be repeated once again at the same level.  This is a mutual decision between the clinician and the patient that includes discussions including risks and benefits in detail as well as alternative therapies.  Patient's questions were answered to their satisfaction and to their understanding.  ---  --- Patient to consider PT referral if pain persists - denied the need for referral today  --- Per Dr. Sharp's last note (8/1/22), patient may consider lumbar MBB/RFA if pain persists following LESI  --- Follow-up for procedure     ELE REPORT  As the clinician, I personally reviewed the ELE from 11/14/22 while the patient was in the office today.    Dictated utilizing Dragon dictation.      Patient remained masked during entire encounter. No cough present. I donned a mask and eye protection throughout entire visit. Prior to donning mask and eye protection, hand hygiene was performed, as well as when it was doffed.  I was closer than 6 feet, but not for an extended period of time. No obvious exposure to any bodily fluids.

## 2022-11-15 ENCOUNTER — OFFICE VISIT (OUTPATIENT)
Dept: INTERNAL MEDICINE | Facility: CLINIC | Age: 87
End: 2022-11-15

## 2022-11-15 VITALS
BODY MASS INDEX: 31.51 KG/M2 | WEIGHT: 184.6 LBS | HEART RATE: 81 BPM | SYSTOLIC BLOOD PRESSURE: 130 MMHG | TEMPERATURE: 98.7 F | OXYGEN SATURATION: 97 % | HEIGHT: 64 IN | DIASTOLIC BLOOD PRESSURE: 80 MMHG

## 2022-11-15 DIAGNOSIS — I87.303 STASIS EDEMA OF BOTH LOWER EXTREMITIES: Primary | Chronic | ICD-10-CM

## 2022-11-15 DIAGNOSIS — E78.5 HYPERLIPIDEMIA LDL GOAL <130: Chronic | ICD-10-CM

## 2022-11-15 DIAGNOSIS — G89.29 CHRONIC LEFT-SIDED LOW BACK PAIN WITH LEFT-SIDED SCIATICA: Chronic | ICD-10-CM

## 2022-11-15 DIAGNOSIS — M54.42 CHRONIC LEFT-SIDED LOW BACK PAIN WITH LEFT-SIDED SCIATICA: Chronic | ICD-10-CM

## 2022-11-15 LAB
ALBUMIN SERPL-MCNC: 4.4 G/DL (ref 3.5–5.2)
ALBUMIN/GLOB SERPL: 1.7 G/DL
ALP SERPL-CCNC: 139 U/L (ref 39–117)
ALT SERPL-CCNC: 14 U/L (ref 1–33)
AST SERPL-CCNC: 19 U/L (ref 1–32)
BILIRUB SERPL-MCNC: 0.6 MG/DL (ref 0–1.2)
BUN SERPL-MCNC: 16 MG/DL (ref 8–23)
BUN/CREAT SERPL: 20.5 (ref 7–25)
CALCIUM SERPL-MCNC: 10 MG/DL (ref 8.6–10.5)
CHLORIDE SERPL-SCNC: 105 MMOL/L (ref 98–107)
CO2 SERPL-SCNC: 28.1 MMOL/L (ref 22–29)
CREAT SERPL-MCNC: 0.78 MG/DL (ref 0.57–1)
EGFRCR SERPLBLD CKD-EPI 2021: 74.5 ML/MIN/1.73
ERYTHROCYTE [DISTWIDTH] IN BLOOD BY AUTOMATED COUNT: 12.1 % (ref 12.3–15.4)
GLOBULIN SER CALC-MCNC: 2.6 GM/DL
GLUCOSE SERPL-MCNC: 86 MG/DL (ref 65–99)
HCT VFR BLD AUTO: 39.8 % (ref 34–46.6)
HGB BLD-MCNC: 12.7 G/DL (ref 12–15.9)
MCH RBC QN AUTO: 29.9 PG (ref 26.6–33)
MCHC RBC AUTO-ENTMCNC: 31.9 G/DL (ref 31.5–35.7)
MCV RBC AUTO: 93.6 FL (ref 79–97)
PLATELET # BLD AUTO: 404 10*3/MM3 (ref 140–450)
POTASSIUM SERPL-SCNC: 4.5 MMOL/L (ref 3.5–5.2)
PROT SERPL-MCNC: 7 G/DL (ref 6–8.5)
RBC # BLD AUTO: 4.25 10*6/MM3 (ref 3.77–5.28)
SODIUM SERPL-SCNC: 141 MMOL/L (ref 136–145)
WBC # BLD AUTO: 6.6 10*3/MM3 (ref 3.4–10.8)

## 2022-11-15 PROCEDURE — 99214 OFFICE O/P EST MOD 30 MIN: CPT | Performed by: NURSE PRACTITIONER

## 2022-11-15 NOTE — PROGRESS NOTES
Chief Complaint  Hyperlipidemia (Follow up )     Subjective:      History of Present Illness {CC  Problem List  Visit  Diagnosis   Encounters  Notes  Medications  Labs  Result Review Imaging  Media :23}     Shanique Martinez presents to [facility] for:      LV: she had bl stasis edema: lasix was started.   Here for follow up of that today. States doing better.    She has been wearing support stockings at home.     11/14/2022: she had follow up with pain management for chronic LBP: L4  Continues with Etodolac 200mg TID, Tylenol Arthritis PRN and Flexeril 10mg 2-3/day.     Doesn't make her sleepy.  Discussed that that put her at higher risks for falls.    Will get LESI in Jan.     Hyperlipidemia: chronic and continues statin.  No CP, SOA     She uses walker when ambulating in the house.      No longer drives.     Recently had flu and COVID bivalent booster.     I have reviewed patient's medical history, any new submitted information provided by patient or medical assistant and updated medical record.      Objective:      Physical Exam  Vitals reviewed.   Constitutional:       Appearance: Normal appearance. She is well-developed.   HENT:      Head:      Comments: Wearing mask due to COVID   Neck:      Thyroid: No thyromegaly.   Cardiovascular:      Rate and Rhythm: Normal rate and regular rhythm.      Pulses: Normal pulses.      Heart sounds: Normal heart sounds.   Pulmonary:      Effort: Pulmonary effort is normal.      Breath sounds: Normal breath sounds.      Comments: E/U   Abdominal:      General: Bowel sounds are normal.   Musculoskeletal:      Cervical back: Normal range of motion and neck supple.   Lymphadenopathy:      Cervical: No cervical adenopathy.   Skin:     General: Skin is warm and dry.      Capillary Refill: Capillary refill takes 2 to 3 seconds.   Neurological:      Mental Status: She is alert and oriented to person, place, and time.   Psychiatric:        "  Mood and Affect: Mood normal.         Behavior: Behavior normal. Behavior is cooperative.         Thought Content: Thought content normal.         Judgment: Judgment normal.        Result Review  Data Reviewed:{ Labs  Result Review  Imaging  Med Tab  Media :23}     The following data was reviewed by: Alvarez Ron III, NP-C on 11/15/2022  Common labs    Common Labs 3/13/22 3/13/22 3/14/22 3/14/22 3/14/22 8/22/22 8/22/22 8/22/22    1054 1054 0509 0509 0807 1036 1036 1036   Glucose  120 (A)  87    100 (A)   BUN  19  16    14   Creatinine  0.76  0.69    0.85   Sodium  139  138    140   Potassium  3.8  3.8 3.9   4.5   Chloride  102  102    103   Calcium  9.4  9.6    9.8   Albumin  4.00      4.20   Total Bilirubin  0.4      0.5   Alkaline Phosphatase  105      98   AST (SGOT)  27      20   ALT (SGPT)  18      14   WBC 10.33  8.63   6.41     Hemoglobin 13.5  13.2   12.7     Hematocrit 39.7  38.5   37.1     Platelets 412  434   376     Hemoglobin A1C       5.50    (A) Abnormal value       Comments are available for some flowsheets but are not being displayed.                  Vital Signs:   /80 Comment: manual left  Pulse 81   Temp 98.7 °F (37.1 °C) (Temporal)   Ht 162.6 cm (64\")   Wt 83.7 kg (184 lb 9.6 oz)   SpO2 97%   BMI 31.69 kg/m²         Requested Prescriptions      No prescriptions requested or ordered in this encounter       Routine medications provided by this office will also be refilled via pharmacy request.       Current Outpatient Medications:   •  atorvastatin (LIPITOR) 10 MG tablet, TAKE ONE TABLET BY MOUTH DAILY, Disp: 90 tablet, Rfl: 4  •  CALCIUM PO, Take 1 tablet by mouth Daily., Disp: , Rfl:   •  cyclobenzaprine (FLEXERIL) 10 MG tablet, Take 1 tablet by mouth 3 (Three) Times a Day., Disp: 90 tablet, Rfl: 0  •  Diclofenac Sodium 3 % gel gel, Apply  topically to the appropriate area as directed 2 (Two) Times a Day. for 60 days., Disp: , Rfl:   •  etodolac (LODINE) 200 MG " capsule, TAKE ONE CAPSULE BY MOUTH EVERY 8 HOURS WITH FOOD, Disp: 90 capsule, Rfl: 0  •  furosemide (Lasix) 20 MG tablet, Take 1 tablet by mouth Every Morning., Disp: 30 tablet, Rfl: 5  •  potassium chloride (K-DUR,KLOR-CON) 10 MEQ CR tablet, Take 1 tablet by mouth Daily., Disp: 30 tablet, Rfl: 5  •  prednisoLONE acetate (PRED FORTE) 1 % ophthalmic suspension, , Disp: , Rfl:      Assessment and Plan:      Assessment and Plan {CC Problem List  Visit Diagnosis  ROS  Review (Popup)  Health Maintenance  Quality  BestPractice  Medications  SmartSets  SnapShot Encounters  Media :23}     Problem List Items Addressed This Visit        Cardiac and Vasculature    Hyperlipidemia LDL goal <130 (Chronic)    Overview     Current medication: lipitor 10 mg daily          Current Assessment & Plan     Lipid abnormalities are improving with treatment.  Pharmacotherapy as ordered.  Lipids will be reassessed in 6 months.         Relevant Orders    Comprehensive Metabolic Panel    CBC (No Diff)    Stasis edema of both lower extremities - Primary (Chronic)    Current Assessment & Plan     Improved - continue lasix.   Will check labs today.          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             Follow Up {Instructions Charge Capture  Follow-up Communications :23}     Return in about 6 months (around 5/15/2023) for Medicare Wellness.      Patient was given instructions and counseling regarding her condition or for health maintenance advice. Please see specific information pulled into the AVS if appropriate.    Dragon disclaimer:   Much of this encounter note is an electronic transcription/translation of spoken language to printed text. The electronic translation of spoken language may  permit erroneous, or at times, nonsensical words or phrases to be inadvertently transcribed; Although I have reviewed the note for such errors, some may still exist.     Additional Patient Counseling:       There are no Patient Instructions on file for this visit.

## 2022-11-17 DIAGNOSIS — M54.42 CHRONIC LEFT-SIDED LOW BACK PAIN WITH LEFT-SIDED SCIATICA: ICD-10-CM

## 2022-11-17 DIAGNOSIS — G89.29 CHRONIC LEFT-SIDED LOW BACK PAIN WITH LEFT-SIDED SCIATICA: ICD-10-CM

## 2022-11-17 RX ORDER — ETODOLAC 200 MG/1
CAPSULE ORAL
Qty: 90 CAPSULE | Refills: 0 | Status: SHIPPED | OUTPATIENT
Start: 2022-11-17 | End: 2022-12-16

## 2022-11-17 RX ORDER — CYCLOBENZAPRINE HCL 10 MG
TABLET ORAL
Qty: 90 TABLET | Refills: 0 | Status: SHIPPED | OUTPATIENT
Start: 2022-11-17 | End: 2022-12-16

## 2022-11-23 ENCOUNTER — TRANSCRIBE ORDERS (OUTPATIENT)
Dept: SURGERY | Facility: SURGERY CENTER | Age: 87
End: 2022-11-23

## 2022-11-23 DIAGNOSIS — Z41.9 SURGERY, ELECTIVE: Primary | ICD-10-CM

## 2022-11-23 PROBLEM — M54.16 LEFT LUMBAR RADICULOPATHY: Status: ACTIVE | Noted: 2022-11-23

## 2022-11-28 ENCOUNTER — OFFICE VISIT (OUTPATIENT)
Dept: WOUND CARE | Facility: HOSPITAL | Age: 87
End: 2022-11-28

## 2022-11-28 PROCEDURE — G0463 HOSPITAL OUTPT CLINIC VISIT: HCPCS

## 2022-12-12 ENCOUNTER — OFFICE VISIT (OUTPATIENT)
Dept: WOUND CARE | Facility: HOSPITAL | Age: 87
End: 2022-12-12

## 2022-12-12 PROCEDURE — G0463 HOSPITAL OUTPT CLINIC VISIT: HCPCS

## 2022-12-15 DIAGNOSIS — M54.42 CHRONIC LEFT-SIDED LOW BACK PAIN WITH LEFT-SIDED SCIATICA: ICD-10-CM

## 2022-12-15 DIAGNOSIS — G89.29 CHRONIC LEFT-SIDED LOW BACK PAIN WITH LEFT-SIDED SCIATICA: ICD-10-CM

## 2022-12-16 RX ORDER — ETODOLAC 200 MG/1
CAPSULE ORAL
Qty: 90 CAPSULE | Refills: 0 | Status: SHIPPED | OUTPATIENT
Start: 2022-12-16 | End: 2023-01-17

## 2022-12-16 RX ORDER — CYCLOBENZAPRINE HCL 10 MG
TABLET ORAL
Qty: 90 TABLET | Refills: 0 | Status: SHIPPED | OUTPATIENT
Start: 2022-12-16 | End: 2023-01-11

## 2022-12-16 NOTE — TELEPHONE ENCOUNTER
Rx Refill Note  Requested Prescriptions     Pending Prescriptions Disp Refills   • cyclobenzaprine (FLEXERIL) 10 MG tablet [Pharmacy Med Name: CYCLOBENZAPRINE 10 MG TABLET] 90 tablet 0     Sig: TAKE ONE TABLET BY MOUTH THREE TIMES A DAY   • etodolac (LODINE) 200 MG capsule [Pharmacy Med Name: ETODOLAC 200 MG CAPSULE] 90 capsule 0     Sig: TAKE ONE CAPSULE BY MOUTH EVERY 8 HOURS WITH FOOD      Last office visit with prescribing clinician: 11/15/2022   Last telemedicine visit with prescribing clinician: 5/17/2023   Next office visit with prescribing clinician: 5/17/2023

## 2022-12-19 ENCOUNTER — OFFICE VISIT (OUTPATIENT)
Dept: WOUND CARE | Facility: HOSPITAL | Age: 87
End: 2022-12-19

## 2022-12-19 PROCEDURE — G0463 HOSPITAL OUTPT CLINIC VISIT: HCPCS

## 2022-12-30 ENCOUNTER — OFFICE VISIT (OUTPATIENT)
Dept: WOUND CARE | Facility: HOSPITAL | Age: 87
End: 2022-12-30
Payer: MEDICARE

## 2022-12-30 PROCEDURE — G0463 HOSPITAL OUTPT CLINIC VISIT: HCPCS

## 2023-01-03 NOTE — SIGNIFICANT NOTE
Patient educated on the following :    - If you are receiving Sedation for your procedure Nothing to Eat 6 hours and only clear liquids for 2 hours prior to your procedure.     -You will need to have someone drive you home after your PROCEDURE and remain with you for 24 hours after the PROCEDURE  - The date of your procedure, your are welcome to have one visitor at bedside or remain within 10-15 minutes of [facility]  -You will need to arrive at 0945 on 1-5-23 for your PROCEDURE  -Please contact [facility] at: [phone] with any questions and/or concerns

## 2023-01-05 ENCOUNTER — HOSPITAL ENCOUNTER (OUTPATIENT)
Dept: GENERAL RADIOLOGY | Facility: SURGERY CENTER | Age: 88
Setting detail: HOSPITAL OUTPATIENT SURGERY
End: 2023-01-05
Payer: MEDICARE

## 2023-01-05 ENCOUNTER — HOSPITAL ENCOUNTER (OUTPATIENT)
Facility: SURGERY CENTER | Age: 88
Setting detail: HOSPITAL OUTPATIENT SURGERY
Discharge: HOME | End: 2023-01-05
Attending: ANESTHESIOLOGY | Admitting: ANESTHESIOLOGY
Payer: MEDICARE

## 2023-01-05 VITALS
HEIGHT: 64 IN | DIASTOLIC BLOOD PRESSURE: 88 MMHG | BODY MASS INDEX: 30.73 KG/M2 | HEART RATE: 87 BPM | TEMPERATURE: 98 F | RESPIRATION RATE: 16 BRPM | OXYGEN SATURATION: 98 % | SYSTOLIC BLOOD PRESSURE: 159 MMHG | WEIGHT: 180 LBS

## 2023-01-05 DIAGNOSIS — M54.16 LEFT LUMBAR RADICULOPATHY: ICD-10-CM

## 2023-01-05 DIAGNOSIS — Z41.9 SURGERY, ELECTIVE: ICD-10-CM

## 2023-01-05 PROCEDURE — 25010000002 METHYLPREDNISOLONE PER 80 MG: Performed by: ANESTHESIOLOGY

## 2023-01-05 PROCEDURE — 25010000002 IOPAMIDOL 61 % SOLUTION 30 ML VIAL: Performed by: ANESTHESIOLOGY

## 2023-01-05 PROCEDURE — 62323 NJX INTERLAMINAR LMBR/SAC: CPT | Performed by: ANESTHESIOLOGY

## 2023-01-05 PROCEDURE — 76000 FLUOROSCOPY <1 HR PHYS/QHP: CPT

## 2023-01-05 PROCEDURE — S0260 H&P FOR SURGERY: HCPCS | Performed by: ANESTHESIOLOGY

## 2023-01-05 PROCEDURE — 77002 NEEDLE LOCALIZATION BY XRAY: CPT

## 2023-01-05 RX ORDER — SODIUM CHLORIDE 0.9 % (FLUSH) 0.9 %
10 SYRINGE (ML) INJECTION EVERY 12 HOURS SCHEDULED
Status: DISCONTINUED | OUTPATIENT
Start: 2023-01-05 | End: 2023-01-05 | Stop reason: HOSPADM

## 2023-01-05 RX ORDER — SODIUM CHLORIDE 0.9 % (FLUSH) 0.9 %
10 SYRINGE (ML) INJECTION AS NEEDED
Status: DISCONTINUED | OUTPATIENT
Start: 2023-01-05 | End: 2023-01-05 | Stop reason: HOSPADM

## 2023-01-05 NOTE — H&P
Brief Pre-procedural / Pre-operative H&P        -----    Pertinent Diagnosis:   Left lumbar radiculopathy    Proposed Procedure: Lumbar epidural steroid injection      Subjective   Shanique Martinez is a 87 y.o. female  who presents for intervention.  She has a history of back pain and leg pain.      History of Present Illness     Back pain and left leg radicular component.  Noted previous positive left straight leg raising maneuver.  Previous diagnostic positive left L4 lumbar transforaminal injection.  Also has some subjective weakness on review of systems.  Aching back pain worse on the left.  Radiating to the left lateral and posterior lateral thigh and increasing with prolonged standing and walking long distances.  She has had a neurosurgical consultation and trying to avoid surgery.  Using multimodal agents as the plan with epidural blocks and physical therapy.  -------    The following portions of the patient's history were reviewed and updated as appropriate: allergies, current medications, past family history, past medical history, past social history, past surgical history and problem list.    Allergies   Allergen Reactions   • Bactrim [Sulfamethoxazole-Trimethoprim] Rash         Current Facility-Administered Medications:   •  sodium chloride 0.9 % flush 10 mL, 10 mL, Intravenous, Q12H, Jonathan Sharp MD  •  sodium chloride 0.9 % flush 10 mL, 10 mL, Intravenous, PRN, Jonathan Sharp MD    No current facility-administered medications on file prior to encounter.     Current Outpatient Medications on File Prior to Encounter   Medication Sig Dispense Refill   • atorvastatin (LIPITOR) 10 MG tablet TAKE ONE TABLET BY MOUTH DAILY 90 tablet 4   • CALCIUM PO Take 1 tablet by mouth Daily.     • furosemide (Lasix) 20 MG tablet Take 1 tablet by mouth Every Morning. 30 tablet 5   • potassium chloride (K-DUR,KLOR-CON) 10 MEQ CR tablet Take 1 tablet by mouth Daily. 30 tablet 5   • prednisoLONE acetate (PRED FORTE) 1  % ophthalmic suspension      • [DISCONTINUED] Diclofenac Sodium 3 % gel gel Apply  topically to the appropriate area as directed 2 (Two) Times a Day. for 60 days.         Patient Active Problem List   Diagnosis   • Hyperlipidemia LDL goal <130   • SI joint arthritis   • Arthritis   • Encounter for long-term (current) drug use   • H/O hysterectomy for benign disease   • Menopause   • Dizziness   • Chronic left-sided low back pain with left-sided sciatica   • Stasis edema of both lower extremities   • Left lumbar radiculopathy       Past Medical History:   Diagnosis Date   • Abnormal skin growth 07/17/2017   • Arthritis    • Cancer (HCC) 2010    Melanoma: L FA    • History of bone density study 2008   • History of mammogram 11/01/2013   • History of mammogram 09/23/2011   • Hyperlipidemia        Past Surgical History:   Procedure Laterality Date   • COLONOSCOPY  2006   • EPIDURAL Left 10/27/2022    Procedure: LEFT L4 LUMBAR/SACRAL TRANSFORAMINAL EPIDURAL STEROID INJECTION;  Surgeon: Jonathan Sharp MD;  Location: [facility] OR;  Service: Pain Management;  Laterality: Left;   • HYSTERECTOMY     • ORIF WRIST FRACTURE Left    • SKIN SURGERY  2000    L FA - melanoma, Dr Caban        Family History   Problem Relation Age of Onset   • Heart disease Mother    • Heart disease Father         Lived until he was 102 yoa    • Cancer Sister    • Stroke Sister    • Breast cancer Sister    • No Known Problems Brother    • No Known Problems Daughter    • No Known Problems Son    • No Known Problems Maternal Grandmother    • No Known Problems Paternal Grandmother    • No Known Problems Maternal Aunt    • No Known Problems Paternal Aunt    • BRCA 1/2 Neg Hx    • Colon cancer Neg Hx    • Endometrial cancer Neg Hx    • Ovarian cancer Neg Hx        Social History     Socioeconomic History   • Marital status:    Tobacco Use   • Smoking status: Never   • Smokeless tobacco: Never   Vaping Use   • Vaping Use: Never used   Substance  "and Sexual Activity   • Alcohol use: No   • Drug use: No   • Sexual activity: Defer       -------       Review of Systems  No Fever, No Chills, No ear pain, No sinus pressure or drainage, No eye pain or drainage, No cough, No SOB, No chest tightness nor chest pain, no palpitations.    As above      Vitals:    01/05/23 1010   BP: 140/72   BP Location: Left arm   Patient Position: Lying   Pulse: 81   Resp: 20   Temp: 98 °F (36.7 °C)   TempSrc: Temporal   SpO2: 99%   Weight: 81.6 kg (180 lb)   Height: 162.6 cm (64\")         Objective   Physical Exam  VSS, NNR, NCAT, NMNA, NRD, AAOx3.  As above    -------    Assessment & Plan:  - as noted above, the stated intervention is indicated  - Follow-up plan will be noted in the operative report        Has fu 1-26-23      EMR Dragon/Transcription disclaimer:   Typed items in this encounter note may have been created by electronic transcription/translation software which converts spoken language to printed text. The electronic translation of spoken language may permit erroneous, or at times, nonsensical words or phrases to be inadvertently transcribed; Although I have reviewed the note for such errors, some may still exist.      "

## 2023-01-05 NOTE — OP NOTE
Lumbar Epidural Steroid Injection  [facility]    PREOPERATIVE DIAGNOSIS:   left Lumbar Radiculopathy  POSTOPERATIVE DIAGNOSIS:  Same as preop diagnosis    PROCEDURE:   Lumbar Epidural Steroid Injection, Therapeutic Translaminar Injection, with epidurogram, at  L4/L5 level    PRE-PROCEDURE DISCUSSION WITH PATIENT:    Risks and complications were discussed with the patient prior to starting the procedure and informed consent was obtained.  We discussed various topics including but not limited to bleeding, infection, injury, paralysis, nerve injury, dural puncture, coma, death, worsening of clinical picture, lack of pain relief, and postprocedural soreness.    SURGEON:  Jonathan Sharp MD    REASON FOR PROCEDURE:    Degenerative changes are noted in the area. and Radiating pattern of pain is likely consistent with degenerative changes in the area.    SEDATION:  Patient declined administration of moderate sedation    ANESTHETIC:  Marcaine 0.25%  STEROID:   Methylprednisolone (DEPO MEDROL) 80mg/ml    DESCRIPTON OF PROCEDURE:    After obtaining informed consent, I.V. was not started in the preop area.   The patient was taken to the operating room and placed in the prone position.  EKG, blood pressure, and pulse oximeter were monitored throughout, and sedation was provided as needed by the RN under my guidance. All pressure points were well padded.  The lumbar spine area was prepped with Chloraprep and draped in a sterile fashion.  Under fluoroscopic guidance, the above mentioned interlaminar space was identified. Skin and subcutaneous tissues were anesthetized with 1% lidocaine in the middle of the space. A Tuohy needle was introduced through the skin and advanced to this interlaminar space and into the epidural space under fluoroscopic guidance and verified with loss-of-resistance technique to air.  After confirming the position of the needle with the fluoroscope with all the views, and after  aspiration was confirmed negative for blood and CSF, 1.5 mL of Omnipaque was injected.  After seeing appropriate epidurogram with lateral and PA views, a total of 3 cc solution was injected, consisting of 2cc of local anesthetic as above, with normal saline and injectable steroid as above.     ESTIMATED BLOOD LOSS:  <5 mL  SPECIMENS:  None    COMPLICATIONS:     No complications were noted., There was no indication of vascular uptake on live injection of contrast dye., There was no indication of intrathecal uptake on live injection of contrast dye., There was not any evidence of dural puncture.   and The patient did not have any signs of postprocedure numbness nor weakness.    TOLERANCE & DISCHARGE CONDITION:    The patient tolerated the procedure well.  The patient was transported to the recovery area without difficulties.  The patient was discharged to home under the care of family in stable and satisfactory condition.    PLAN OF CARE:  1. The patient was given our standard instruction sheet.  2. The patient will Return to clinic 3-4 wks  3. The patient will resume all medications as per the medication reconciliation sheet.

## 2023-01-05 NOTE — DISCHARGE INSTRUCTIONS
[facility] - Post-procedure Instructions          --  While there are no absolute restrictions, it is recommended that you do not perform strenuous activity today. In the morning, you may resume your level of activity as before your block.    --  If you have a band-aid at your injection site, please remove it later today. Observe the area for any redness, swelling, pus-like drainage, or a temperature over 101°. If any of these symptoms occur, please call your doctor at [phone]. If after office hours, leave a message and the on-call provider will return your call.    --  Ice may be applied to your injection site. It is recommended you avoid direct heat (heating pad; hot tub) for 1-2 days.    --  Call [facility] at [phone] if you experience persistent headache, persistent bleeding from the injection site, or severe pain not relieved by heat or oral medication.    --  Do not make important decisions today.    --  Due to the effects of the block and/or the I.V. Sedation, DO NOT drive or operate hazardous machinery for 12 hours.  Local anesthetics may cause numbness after procedure and precautions must be taken with regards to operating equipment as well as with walking, even if ambulating with assistance of another person or with an assistive device.    --  Do not drink alcohol for 12 hours.    -- You may return to work tomorrow, or as directed by your referring doctor.    --  Occasionally you may notice a slight increase in your pain after the procedure. This should start to improve within the next 24-48 hours. Radiofrequency ablation procedure pain may last 3-4 weeks.    --  It may take as long as 3-4 days before you notice a gradual improvement in your pain and/or other symptoms.    -- You may continue to take your prescribed pain medication as needed.    --  Some normal possible side effects of steroid use could include fluid retention, increased blood sugar, dull headache,  increased sweating, increased appetite, mood swings and flushing.    --  Diabetics are recommended to watch their blood glucose level closely for 24-48 hours after the injection.    --  Must stay in PACU for 20 min upon arrival and prove no leg weakness before being discharged.    --  IN THE EVENT OF A LIFE THREATENING EMERGENCY, (CHEST PAIN, BREATHING DIFFICULTIES, PARALYSIS…) YOU SHOULD GO TO YOUR NEAREST EMERGENCY ROOM.    --  You should be contacted by our office within 2-3 days to schedule follow up or next appointment date.  If not contacted within 7 days, please call the office at [phone]

## 2023-01-11 DIAGNOSIS — M54.42 CHRONIC LEFT-SIDED LOW BACK PAIN WITH LEFT-SIDED SCIATICA: ICD-10-CM

## 2023-01-11 DIAGNOSIS — G89.29 CHRONIC LEFT-SIDED LOW BACK PAIN WITH LEFT-SIDED SCIATICA: ICD-10-CM

## 2023-01-11 RX ORDER — CYCLOBENZAPRINE HCL 10 MG
TABLET ORAL
Qty: 90 TABLET | Refills: 0 | Status: SHIPPED | OUTPATIENT
Start: 2023-01-11 | End: 2023-02-10

## 2023-01-11 NOTE — TELEPHONE ENCOUNTER
Rx Refill Note  Requested Prescriptions     Pending Prescriptions Disp Refills   • cyclobenzaprine (FLEXERIL) 10 MG tablet [Pharmacy Med Name: CYCLOBENZAPRINE 10 MG TABLET] 90 tablet 0     Sig: TAKE ONE TABLET BY MOUTH THREE TIMES A DAY      Last office visit with prescribing clinician: 11/15/2022   Last telemedicine visit with prescribing clinician: 5/17/2023   Next office visit with prescribing clinician: 5/17/2023                         Would you like a call back once the refill request has been completed: [] Yes [] No    If the office needs to give you a call back, can they leave a voicemail: [] Yes [] No    Hina Maravilla MA  01/11/23, 13:53 EST

## 2023-01-17 DIAGNOSIS — M54.42 CHRONIC LEFT-SIDED LOW BACK PAIN WITH LEFT-SIDED SCIATICA: ICD-10-CM

## 2023-01-17 DIAGNOSIS — G89.29 CHRONIC LEFT-SIDED LOW BACK PAIN WITH LEFT-SIDED SCIATICA: ICD-10-CM

## 2023-01-17 RX ORDER — ETODOLAC 200 MG/1
CAPSULE ORAL
Qty: 30 CAPSULE | Refills: 1 | Status: SHIPPED | OUTPATIENT
Start: 2023-01-17 | End: 2023-02-10

## 2023-01-19 DIAGNOSIS — E78.5 HYPERLIPIDEMIA LDL GOAL <130: ICD-10-CM

## 2023-01-19 RX ORDER — ATORVASTATIN CALCIUM 10 MG/1
TABLET, FILM COATED ORAL
Qty: 90 TABLET | Refills: 4 | Status: SHIPPED | OUTPATIENT
Start: 2023-01-19

## 2023-01-26 ENCOUNTER — OFFICE VISIT (OUTPATIENT)
Dept: PAIN MEDICINE | Facility: CLINIC | Age: 88
End: 2023-01-26
Payer: MEDICARE

## 2023-01-26 VITALS
WEIGHT: 186.8 LBS | TEMPERATURE: 97.1 F | DIASTOLIC BLOOD PRESSURE: 60 MMHG | HEART RATE: 69 BPM | BODY MASS INDEX: 31.89 KG/M2 | SYSTOLIC BLOOD PRESSURE: 160 MMHG | HEIGHT: 64 IN | OXYGEN SATURATION: 96 % | RESPIRATION RATE: 12 BRPM

## 2023-01-26 DIAGNOSIS — M47.816 LUMBAR FACET ARTHROPATHY: ICD-10-CM

## 2023-01-26 DIAGNOSIS — G89.4 CHRONIC PAIN SYNDROME: ICD-10-CM

## 2023-01-26 DIAGNOSIS — M54.16 LEFT LUMBAR RADICULOPATHY: Primary | ICD-10-CM

## 2023-01-26 PROCEDURE — 99212 OFFICE O/P EST SF 10 MIN: CPT

## 2023-01-26 NOTE — PROGRESS NOTES
CHIEF COMPLAINT  Back pain     Subjective   Shanique Martinez is a 87 y.o. female  who presents to the office for follow-up of procedure.  She completed a L4/L5 LESI on 1/5/23 performed by Dr. Sharp for management of low back pain. Patient reports 80% ongoing relief from the procedure. She is able to ambulate long distances with less pain and does not always need her walker for assistance.     Today pain is 1/10VAS in severity. Pain is located in her low back (L side > R side) and radiates down left lateral/posterior thigh. Describes this pain as an intermittent ache. She reports that the radiating pain has been less frequent since her epidural. Pain is worsened by increased physical activity, household chores  that require twisting or prolonged standing, and walking long distances. Pain improves with rest/reposition, use of a TENS unit, and medications. Continues with Etodolac 200mg TID, Tylenol Arthritis PRN and Flexeril 10mg 2-3/day.      Patient saw Dr. Ludwig St on 8/2/2022.  In review of his note he noted that he would recommend that patient try to avoid surgery if at all possible.  He suggested epidural blocks and physical therapy.      She gets bilateral knee gel injections with Dr. Clark every 6 months. She had injections last week and while there was given a referral for PT.    Procedures:  1/5/23 - L4/L5 LESI - 80% ongoing relief  10/27/22 - Left L4 TF LESI - 95% relief x 2 weeks    Back Pain  This is a chronic problem. The problem occurs intermittently. The problem has been gradually improving since onset. The pain is present in the lumbar spine (L side > R side). The quality of the pain is described as aching. The pain radiates to the left thigh (left lateral/posterior thigh). The pain is at a severity of 1/10. The symptoms are aggravated by standing and position (increased physical activity, prolonged standing, walking long distances). Pertinent negatives include no abdominal pain, chest pain,  "dysuria, fever, headaches, numbness or weakness. Risk factors include sedentary lifestyle. She has tried muscle relaxant and NSAIDs (rest/reposition, PT in the past, TENS unit, Tylenol, Lodine, Flexeril) for the symptoms.      PEG Assessment   What number best describes your pain on average in the past week?3  What number best describes how, during the past week, pain has interfered with your enjoyment of life?3  What number best describes how, during the past week, pain has interfered with your general activity?  3    The following portions of the patient's history were reviewed and updated as appropriate: allergies, current medications, past family history, past medical history, past social history, past surgical history and problem list.    Review of Systems   Constitutional: Positive for activity change. Negative for fatigue and fever.   HENT: Negative for congestion.    Eyes: Negative for visual disturbance.   Respiratory: Negative for cough and chest tightness.    Cardiovascular: Negative for chest pain.   Gastrointestinal: Negative for abdominal pain, constipation and diarrhea.   Genitourinary: Negative for difficulty urinating and dysuria.   Musculoskeletal: Positive for back pain.   Neurological: Negative for dizziness, weakness, light-headedness, numbness and headaches.   Psychiatric/Behavioral: Negative for agitation, sleep disturbance and suicidal ideas. The patient is not nervous/anxious.      I have reviewed and confirmed the accuracy of the ROS as documented by the MA/LPN/RN LALITHA Best     Vitals:    01/26/23 0935 01/26/23 0941   BP: (!) 181/76 160/60   BP Location: Right arm Left arm   Patient Position: Sitting Sitting   Cuff Size: Large Adult Adult   Pulse: 69    Resp: 12    Temp: 97.1 °F (36.2 °C)    TempSrc: Temporal    SpO2: 96%    Weight: 84.7 kg (186 lb 12.8 oz)    Height: 162.6 cm (64\")    PainSc:   1    PainLoc: Comment: BACK      Objective   Physical Exam  Constitutional:       " Appearance: Normal appearance.   HENT:      Head: Normocephalic.   Cardiovascular:      Rate and Rhythm: Normal rate and regular rhythm.   Pulmonary:      Effort: Pulmonary effort is normal.      Breath sounds: Normal breath sounds.   Musculoskeletal:      Cervical back: Normal range of motion.      Lumbar back: No tenderness or bony tenderness. Positive left straight leg raise test. Negative right straight leg raise test.   Skin:     General: Skin is warm and dry.      Capillary Refill: Capillary refill takes less than 2 seconds.   Neurological:      General: No focal deficit present.      Mental Status: She is alert and oriented to person, place, and time.      Gait: Gait abnormal (slowed, ambulates with walker).   Psychiatric:         Mood and Affect: Mood normal.         Behavior: Behavior normal.         Thought Content: Thought content normal.         Cognition and Memory: Cognition normal.       Assessment & Plan   Diagnoses and all orders for this visit:    1. Left lumbar radiculopathy (Primary)    2. Chronic pain syndrome    3. Lumbar facet arthropathy    --- Per Dr. Sharp's last note (8/1/22), patient may consider lumbar MBB/RFA in the future if axial back pain were to worsen   --- Start PT when able (was given referral by Dr. Clark)  --- Follow-up for increased pain or to repeat procedures     ELE REPORT  As the clinician, I personally reviewed the ELE from 1/26/23 while the patient was in the office today.    Dictated utilizing Dragon dictation.      Patient remained masked during entire encounter. No cough present. I donned a mask and eye protection throughout entire visit. Prior to donning mask and eye protection, hand hygiene was performed, as well as when it was doffed.  I was closer than 6 feet, but not for an extended period of time. No obvious exposure to any bodily fluids.

## 2023-02-10 ENCOUNTER — TELEPHONE (OUTPATIENT)
Dept: INTERNAL MEDICINE | Facility: CLINIC | Age: 88
End: 2023-02-10

## 2023-02-10 DIAGNOSIS — G89.29 CHRONIC LEFT-SIDED LOW BACK PAIN WITH LEFT-SIDED SCIATICA: ICD-10-CM

## 2023-02-10 DIAGNOSIS — M54.42 CHRONIC LEFT-SIDED LOW BACK PAIN WITH LEFT-SIDED SCIATICA: ICD-10-CM

## 2023-02-10 RX ORDER — CYCLOBENZAPRINE HCL 10 MG
TABLET ORAL
Qty: 90 TABLET | Refills: 0 | Status: SHIPPED | OUTPATIENT
Start: 2023-02-10 | End: 2023-03-09

## 2023-02-10 RX ORDER — ETODOLAC 200 MG/1
CAPSULE ORAL
Qty: 30 CAPSULE | Refills: 1 | Status: SHIPPED | OUTPATIENT
Start: 2023-02-10 | End: 2023-03-09

## 2023-02-10 NOTE — TELEPHONE ENCOUNTER
Caller: Shanique Martinez    Relationship to patient: Self    Best call back number: [phone]    Patient is needing: PATIENT STATES SHE RECEIVED A LETTER FROM INSURANCE SAYING cyclobenzaprine (FLEXERIL) 10 MG tablet  WAS NOT ON THEIR FORMULARY, BUT COULD BE APPROVED ID PROVIDER REQUESTED  (PRIOR AUTHORIZATION ?)

## 2023-02-15 ENCOUNTER — TELEPHONE (OUTPATIENT)
Dept: PAIN MEDICINE | Facility: CLINIC | Age: 88
End: 2023-02-15

## 2023-02-15 NOTE — TELEPHONE ENCOUNTER
Caller: tomasz devine    Relationship to patient: Emergency Contact    Best call back number: [phone]    Chief complaint: BACK    Type of visit: EPIDURAL       Vaginal delivery

## 2023-02-17 NOTE — TELEPHONE ENCOUNTER
Caller: tomasz devine    Relationship to patient: Emergency Contact    Best call back number: [phone]    Patient is needing: PATIENT HAS SOME SPECIFIC QUESTIONS ABOUT THE TWO DIAGNOSTIC TESTS PRIOR TO THE ABLATION.

## 2023-02-20 ENCOUNTER — PREP FOR SURGERY (OUTPATIENT)
Dept: SURGERY | Facility: SURGERY CENTER | Age: 88
End: 2023-02-20
Payer: MEDICARE

## 2023-02-20 ENCOUNTER — TELEPHONE (OUTPATIENT)
Dept: PAIN MEDICINE | Facility: CLINIC | Age: 88
End: 2023-02-20
Payer: MEDICARE

## 2023-02-20 DIAGNOSIS — M47.816 LUMBAR FACET ARTHROPATHY: Primary | ICD-10-CM

## 2023-02-20 NOTE — TELEPHONE ENCOUNTER
Spoke with patient's daughter she had a question in regards to what were the test shots (LMBB) and I explained to her process she verbally understood and would like for her mother to move forward with procedure .

## 2023-02-20 NOTE — TELEPHONE ENCOUNTER
Per  her mother is not on a blood thinner. She has been informed someone from scheduling will reach out in the next week or so to get her scheduled for procedure .

## 2023-02-20 NOTE — TELEPHONE ENCOUNTER
Please let her know that I have placed that order. Can you please verify that she is not on a blood thinner? Someone will contact her to get the procedure scheduled. Let me know if they have any questions. Thanks.

## 2023-02-28 ENCOUNTER — TRANSCRIBE ORDERS (OUTPATIENT)
Dept: SURGERY | Facility: SURGERY CENTER | Age: 88
End: 2023-02-28
Payer: MEDICARE

## 2023-02-28 DIAGNOSIS — Z41.9 SURGERY, ELECTIVE: Primary | ICD-10-CM

## 2023-02-28 PROBLEM — M47.816 LUMBAR FACET ARTHROPATHY: Status: ACTIVE | Noted: 2023-02-28

## 2023-03-05 ENCOUNTER — APPOINTMENT (OUTPATIENT)
Dept: GENERAL RADIOLOGY | Facility: HOSPITAL | Age: 88
End: 2023-03-05
Payer: MEDICARE

## 2023-03-05 ENCOUNTER — HOSPITAL ENCOUNTER (EMERGENCY)
Facility: HOSPITAL | Age: 88
Discharge: HOME | End: 2023-03-05
Attending: EMERGENCY MEDICINE | Admitting: EMERGENCY MEDICINE
Payer: MEDICARE

## 2023-03-05 VITALS — RESPIRATION RATE: 18 BRPM | TEMPERATURE: 98.1 F | OXYGEN SATURATION: 96 % | HEART RATE: 100 BPM

## 2023-03-05 DIAGNOSIS — S16.1XXA STRAIN OF NECK MUSCLE, INITIAL ENCOUNTER: Primary | ICD-10-CM

## 2023-03-05 PROCEDURE — 63710000001 ONDANSETRON ODT 4 MG TABLET DISPERSIBLE: Performed by: EMERGENCY MEDICINE

## 2023-03-05 PROCEDURE — 72050 X-RAY EXAM NECK SPINE 4/5VWS: CPT

## 2023-03-05 PROCEDURE — 25010000002 MORPHINE PER 10 MG: Performed by: EMERGENCY MEDICINE

## 2023-03-05 PROCEDURE — 96372 THER/PROPH/DIAG INJ SC/IM: CPT

## 2023-03-05 PROCEDURE — 99283 EMERGENCY DEPT VISIT LOW MDM: CPT

## 2023-03-05 RX ORDER — MORPHINE SULFATE 10 MG/ML
6 INJECTION INTRAMUSCULAR; INTRAVENOUS; SUBCUTANEOUS ONCE
Status: COMPLETED | OUTPATIENT
Start: 2023-03-05 | End: 2023-03-05

## 2023-03-05 RX ORDER — PREDNISONE 20 MG/1
40 TABLET ORAL DAILY
Qty: 5 TABLET | Refills: 0 | Status: ON HOLD | OUTPATIENT
Start: 2023-03-05 | End: 2023-03-23

## 2023-03-05 RX ORDER — HYDROCODONE BITARTRATE AND ACETAMINOPHEN 5; 325 MG/1; MG/1
1 TABLET ORAL EVERY 4 HOURS PRN
Qty: 12 TABLET | Refills: 0 | Status: SHIPPED | OUTPATIENT
Start: 2023-03-05 | End: 2023-03-30 | Stop reason: SDUPTHER

## 2023-03-05 RX ORDER — ONDANSETRON 4 MG/1
4 TABLET, ORALLY DISINTEGRATING ORAL ONCE
Status: COMPLETED | OUTPATIENT
Start: 2023-03-05 | End: 2023-03-05

## 2023-03-05 RX ADMIN — MORPHINE SULFATE 6 MG: 10 INJECTION INTRAVENOUS at 11:32

## 2023-03-05 RX ADMIN — ONDANSETRON 4 MG: 4 TABLET, ORALLY DISINTEGRATING ORAL at 11:33

## 2023-03-05 NOTE — ED PROVIDER NOTES
MD ATTESTATION NOTE    The ADAMA and I have discussed this patient's history, physical exam, and treatment plan.  I have reviewed the documentation and personally had a face to face interaction with the patient. I affirm the documentation and agree with the treatment and plan.  The attached note describes my personal findings.    I provided a substantive portion of the care of this patient. I personally performed the physical exam, in its entirety.    History  Is a 87-year-old female with history of lumbar disc disease presents with worsening of neck pain without known injury.  Pain does radiate towards the left arm.    Physical Exam  Vital Signs reviewed  GENERAL: Alert and pleasant female no obvious distress.  Triage vitals reviewed and are notably benign.  Temperature 98.1.  HENT: nares patent  EYES: no scleral icterus  CV: regular rhythm, regular rate  RESPIRATORY: normal effort, clear to auscultation bilateral  ABDOMEN: soft  MUSCULOSKELETAL: Spine-mild diffuse left paracervical tenderness without noted bony deformity.  Upper extremities-atraumatic  Lower extremities-atraumatic  NEURO: Strength sensation and coordination are grossly intact.  Speech and mentation are unremarkable  SKIN: warm, dry      Disposition  I discussed treatment and evaluation of this patient with EMILY Mirza.  Plain films reviewed and show significant arthritis.  Patient has some relief with IM injection of pain medication.  Will discharge home with short course of narcotic pain medication follow-up with primary care provider       Mart Wynn MD  03/05/23 9499

## 2023-03-05 NOTE — ED PROVIDER NOTES
EMERGENCY DEPARTMENT ENCOUNTER    Room Number:  04/04  Date of encounter:  3/5/2023  PCP: Alvarez Ron III, NP-C  Historian: Patient, daughter  Chronic or social conditions impacting care: Nothing      I used full protective equipment while examining this patient.  This includes face mask, gloves and protective eyewear.  I washed my hands before entering the room and immediately upon leaving the room      HPI:  Chief Complaint: Neck pain  A complete HPI/ROS/PMH/PSH/SH/FH are unobtainable due to: Nothing    Context: Shanique Martinez is a 87 y.o. female who presents to the ED c/o several day history of gradual onset, constant, progressively worsening neck pain.  Patient localizes pain to the left trapezius area.  She states pain radiates from the base of her skull down her neck and over to her shoulder.  Pain is made worse with movement of the head.  She denies any pain, tingling, numbness going down the left arm.  She denies any weakness in the left arm.  She denies any chest pain or shortness of breath.  She denies any prior history of significant neck pain.  She does have a history of back issues.  She follows with pain management and is due for an ablation in the upcoming weeks.    Review of Medical Records  I reviewed Epic records from 1/26/2023.  Patient saw Dr. Mehta in pain management.  Patient being treated for left lumbar radiculopathy.    PAST MEDICAL HISTORY  Active Ambulatory Problems     Diagnosis Date Noted   • Hyperlipidemia LDL goal <130 11/08/2016   • SI joint arthritis 11/08/2016   • Arthritis 09/25/2018   • Encounter for long-term (current) drug use 09/25/2018   • H/O hysterectomy for benign disease 10/01/2012   • Menopause 10/01/2012   • Dizziness 03/13/2022   • Chronic left-sided low back pain with left-sided sciatica 08/01/2022   • Stasis edema of both lower extremities 08/10/2022   • Left lumbar radiculopathy 11/23/2022   • Lumbar facet arthropathy 02/28/2023     Resolved  Ambulatory Problems     Diagnosis Date Noted   • Abnormal skin growth 07/17/2017     Past Medical History:   Diagnosis Date   • Cancer (HCC) 2010   • History of bone density study 2008   • History of mammogram 11/01/2013   • History of mammogram 09/23/2011   • Hyperlipidemia          PAST SURGICAL HISTORY  Past Surgical History:   Procedure Laterality Date   • COLONOSCOPY  2006   • EPIDURAL Left 10/27/2022    Procedure: LEFT L4 LUMBAR/SACRAL TRANSFORAMINAL EPIDURAL STEROID INJECTION;  Surgeon: Jonathan Sharp MD;  Location: [facility];  Service: Pain Management;  Laterality: Left;   • HYSTERECTOMY     • LUMBAR EPIDURAL INJECTION N/A 1/5/2023    Procedure: L4/L5 LUMBAR EPIDURAL STEROID INJECTION;  Surgeon: Jonathan Sharp MD;  Location: [facility];  Service: Pain Management;  Laterality: N/A;   • ORIF WRIST FRACTURE Left    • SKIN SURGERY  2000    L FA - melanoma, Dr Caban          FAMILY HISTORY  Family History   Problem Relation Age of Onset   • Heart disease Mother    • Heart disease Father         Lived until he was 102 yoa    • Cancer Sister    • Stroke Sister    • Breast cancer Sister    • No Known Problems Brother    • No Known Problems Daughter    • No Known Problems Son    • No Known Problems Maternal Grandmother    • No Known Problems Paternal Grandmother    • No Known Problems Maternal Aunt    • No Known Problems Paternal Aunt    • BRCA 1/2 Neg Hx    • Colon cancer Neg Hx    • Endometrial cancer Neg Hx    • Ovarian cancer Neg Hx          SOCIAL HISTORY  Social History     Socioeconomic History   • Marital status:    Tobacco Use   • Smoking status: Never   • Smokeless tobacco: Never   Vaping Use   • Vaping Use: Never used   Substance and Sexual Activity   • Alcohol use: No   • Drug use: No   • Sexual activity: Defer         ALLERGIES  Bactrim [sulfamethoxazole-trimethoprim]        REVIEW OF SYSTEMS  All systems reviewed and negative except for those discussed in HPI.       PHYSICAL  EXAM    I have reviewed the triage vital signs and nursing notes.    ED Triage Vitals [03/05/23 1102]   Temp Heart Rate Resp BP SpO2   98.1 °F (36.7 °C) 100 18 -- 96 %      Temp src Heart Rate Source Patient Position BP Location FiO2 (%)   Tympanic Monitor -- -- --       Physical Exam  GENERAL: Alert, oriented, not distressed  HENT: head atraumatic, mild tenderness to the left trapezius muscle.  Decreased range of motion of the neck secondary to pain.  EYES: no scleral icterus, EOMI  CV: regular rhythm, regular rate, no murmur  RESPIRATORY: normal effort, CTA  ABDOMEN: soft, nontender  MUSCULOSKELETAL: no deformity, FROM, no calf swelling or tenderness  NEURO: alert, 5/5 strength in upper extremities bilaterally.  Sensation grossly intact.  SKIN: warm, dry    RADIOLOGY  XR Spine Cervical Complete 4 or 5 View    Result Date: 3/5/2023  4 VIEW CERVICAL SPINE  HISTORY: Progressive neck pain.  FINDINGS: There is very severe disc space narrowing and spurring at C4-5, C5-6 and C6-7 and to a lesser extent at C3-4. There is also considerable spurring of the lateral facets. The oblique views are somewhat limited but do suggest a component of bony foraminal encroachment bilaterally.  This report was finalized on 3/5/2023 12:07 PM by Dr. Angus Rios M.D.        I ordered the above noted radiological studies. Reviewed by me and discussed with radiologist.  See dictation for official radiology interpretation.      MEDICATIONS GIVEN IN ER    Medications   Morphine injection 6 mg (6 mg Intramuscular Given 3/5/23 1132)   ondansetron ODT (ZOFRAN-ODT) disintegrating tablet 4 mg (4 mg Oral Given 3/5/23 1133)         ADDITIONAL ORDERS CONSIDERED BUT NOT ORDERED:  Considered EKG and troponin however patient's pain is reproducible with palpation.  She has no chest pain or shortness of breath.      PROGRESS, DATA ANALYSIS, CONSULTS, AND MEDICAL DECISION MAKING    All labs have been independently interpreted by myself.  All radiology  studies have been independently interpreted by myself and discussed with radiologist dictating the report.   EKG's independently interpreted by myself.  Discussion below represents my analysis of pertinent findings related to patient's condition, differential diagnosis, treatment plan and final disposition.    I have discussed case with Dr. Wynn, emergency room physician.  He has performed his own bedside examination and agrees with treatment plan.    ED Course as of 03/05/23 1554   Sun Mar 05, 2023   1126 Patient presents with several day history of left-sided posterior neck pain.  No radicular pain down the arm.  No neurodeficits.  No chest pain or shortness of breath.  Pain reproducible with palpation and movement of the head.  Plan for pain control and x-rays. [EE]   1215 Cervical spine films independently interpreted myself show severe disc narrowing from C4-C7.  No obvious fracture. [EE]      ED Course User Index  [EE] Germain Mirza PA       AS OF 15:54 EST VITALS:    BP -    HR - 100  TEMP - 98.1 °F (36.7 °C) (Tympanic)  O2 SATS - 96%        DIAGNOSIS  Final diagnoses:   Strain of neck muscle, initial encounter         DISPOSITION  Discharged      Dictated utilizing Dragon dictation     Germain Mirza PA  03/05/23 8766

## 2023-03-05 NOTE — ED NOTES
Pt to ed from home via PV.    Pt c/o L shoulder pain and neck pain. No known injury. Pt has chronic back pain.

## 2023-03-09 DIAGNOSIS — G89.29 CHRONIC LEFT-SIDED LOW BACK PAIN WITH LEFT-SIDED SCIATICA: ICD-10-CM

## 2023-03-09 DIAGNOSIS — I87.303 STASIS EDEMA OF BOTH LOWER EXTREMITIES: ICD-10-CM

## 2023-03-09 DIAGNOSIS — M54.42 CHRONIC LEFT-SIDED LOW BACK PAIN WITH LEFT-SIDED SCIATICA: ICD-10-CM

## 2023-03-09 RX ORDER — POTASSIUM CHLORIDE 750 MG/1
TABLET, EXTENDED RELEASE ORAL
Qty: 90 TABLET | Refills: 1 | Status: SHIPPED | OUTPATIENT
Start: 2023-03-09

## 2023-03-09 RX ORDER — ETODOLAC 200 MG/1
CAPSULE ORAL
Qty: 30 CAPSULE | Refills: 1 | Status: SHIPPED | OUTPATIENT
Start: 2023-03-09 | End: 2023-03-30 | Stop reason: SDUPTHER

## 2023-03-09 RX ORDER — CYCLOBENZAPRINE HCL 10 MG
TABLET ORAL
Qty: 90 TABLET | Refills: 0 | Status: SHIPPED | OUTPATIENT
Start: 2023-03-09

## 2023-03-09 RX ORDER — FUROSEMIDE 20 MG/1
TABLET ORAL
Qty: 90 TABLET | Refills: 1 | Status: SHIPPED | OUTPATIENT
Start: 2023-03-09

## 2023-03-09 NOTE — TELEPHONE ENCOUNTER
Rx Refill Note  Requested Prescriptions     Pending Prescriptions Disp Refills   • etodolac (LODINE) 200 MG capsule [Pharmacy Med Name: ETODOLAC 200 MG CAPSULE] 30 capsule 1     Sig: TAKE ONE CAPSULE BY MOUTH EVERY 8 HOURS WITH FOOD   • furosemide (LASIX) 20 MG tablet [Pharmacy Med Name: FUROSEMIDE 20 MG TABLET] 90 tablet      Sig: TAKE ONE TABLET BY MOUTH EVERY MORNING   • cyclobenzaprine (FLEXERIL) 10 MG tablet [Pharmacy Med Name: CYCLOBENZAPRINE 10 MG TABLET] 90 tablet 0     Sig: TAKE ONE TABLET BY MOUTH THREE TIMES A DAY   • potassium chloride (K-DUR,KLOR-CON) 10 MEQ CR tablet [Pharmacy Med Name: POTASSIUM CHLORIDE ER M-10 MEQ TAB] 90 tablet      Sig: TAKE ONE TABLET BY MOUTH DAILY      Last office visit with prescribing clinician: 11/15/2022   Last telemedicine visit with prescribing clinician: 5/17/2023   Next office visit with prescribing clinician: 5/17/2023                         Would you like a call back once the refill request has been completed: [] Yes [] No    If the office needs to give you a call back, can they leave a voicemail: [] Yes [] No    Hina Maravilla  03/09/23, 09:56 EST

## 2023-03-10 ENCOUNTER — TELEPHONE (OUTPATIENT)
Dept: PAIN MEDICINE | Facility: CLINIC | Age: 88
End: 2023-03-10
Payer: MEDICARE

## 2023-03-10 DIAGNOSIS — M54.16 LEFT LUMBAR RADICULOPATHY: Primary | ICD-10-CM

## 2023-03-10 DIAGNOSIS — M47.816 LUMBAR FACET ARTHROPATHY: ICD-10-CM

## 2023-03-10 RX ORDER — HYDROCODONE BITARTRATE AND ACETAMINOPHEN 5; 325 MG/1; MG/1
1 TABLET ORAL EVERY 4 HOURS PRN
Qty: 18 TABLET | Refills: 0 | Status: ON HOLD | OUTPATIENT
Start: 2023-03-10 | End: 2023-03-23

## 2023-03-10 NOTE — TELEPHONE ENCOUNTER
It looks like she was given a supply of Hydrocodone from the ER on 3/5/23. Is she currently out of this medication? Did she tolerate well? Has she ever taken any other narcotic medication for pain relief?

## 2023-03-10 NOTE — TELEPHONE ENCOUNTER
Per our conversation she got the pain medication in the ER for her neck pain. It helped a little. She is out the the medication now.

## 2023-03-10 NOTE — TELEPHONE ENCOUNTER
I will send over a temporary supply of Hydrocodone 5mg but it will have to last until her procedure. If she were to require further medication, she will need to be seen in the office. ELE reviewed and appropriate.

## 2023-03-10 NOTE — TELEPHONE ENCOUNTER
Ms. Martinez's daughter called and states that her mother is having a lot of back pain and wanted to know if you could give her something to help with the pain until she gets her back injection done on 3/23/2023? Her PCP is already giving her etodolac 200 mg and flexeril 10 mg but it's not doing enough to help with the pain. Her daughter wanted to know if you could maybe give her gabapentin to help with the radiating pain or maybe tramadol to help with the pain? She states that she just wants to help make her mother more comfortable while she waits to get the procedure done.

## 2023-03-13 ENCOUNTER — TELEPHONE (OUTPATIENT)
Dept: PAIN MEDICINE | Facility: CLINIC | Age: 88
End: 2023-03-13
Payer: MEDICARE

## 2023-03-13 NOTE — TELEPHONE ENCOUNTER
Patients daughter Hermila DU called and stated that she took the patient to the ER because she was in a lot of pain. They prescribed her a 2 day supply of Norco which she stretched to 4-5 days. She wanted to know if we would prescribe her some medication to last her until her injection. Please advise.

## 2023-03-13 NOTE — TELEPHONE ENCOUNTER
I sent a prescription to her pharmacy on Friday. I hate that they didn't call her to tell her it was ready and that she went all weekend without it. I sent 18 tablets so hopefully this will last until her procedure.

## 2023-03-15 ENCOUNTER — TRANSCRIBE ORDERS (OUTPATIENT)
Dept: SURGERY | Facility: SURGERY CENTER | Age: 88
End: 2023-03-15
Payer: MEDICARE

## 2023-03-15 DIAGNOSIS — Z41.9 SURGERY, ELECTIVE: Primary | ICD-10-CM

## 2023-03-21 NOTE — SIGNIFICANT NOTE
Patient educated on the following :    - If you are receiving Sedation for your procedure Nothing to Eat 6 hours and only clear liquids for 2 hours prior to your procedure.    -You will need to have someone drive you home after your PROCEDURE and remain with you for 24 hours after the PROCEDURE  - The date of your procedure, your are welcome to have one visitor at bedside or remain within 10-15 minutes of [facility]  -You will need to arrive at 1430 on 3/23/23 PROCEDURE  -Please contact [facility] at: [phone] with any questions and/or concerns

## 2023-03-23 ENCOUNTER — HOSPITAL ENCOUNTER (OUTPATIENT)
Dept: GENERAL RADIOLOGY | Facility: SURGERY CENTER | Age: 88
Setting detail: HOSPITAL OUTPATIENT SURGERY
End: 2023-03-23
Payer: MEDICARE

## 2023-03-23 ENCOUNTER — HOSPITAL ENCOUNTER (OUTPATIENT)
Facility: SURGERY CENTER | Age: 88
Setting detail: HOSPITAL OUTPATIENT SURGERY
Discharge: HOME | End: 2023-03-23
Attending: ANESTHESIOLOGY | Admitting: ANESTHESIOLOGY
Payer: MEDICARE

## 2023-03-23 VITALS
BODY MASS INDEX: 30.73 KG/M2 | TEMPERATURE: 97.5 F | RESPIRATION RATE: 16 BRPM | OXYGEN SATURATION: 99 % | HEART RATE: 96 BPM | HEIGHT: 64 IN | SYSTOLIC BLOOD PRESSURE: 144 MMHG | WEIGHT: 180 LBS | DIASTOLIC BLOOD PRESSURE: 71 MMHG

## 2023-03-23 DIAGNOSIS — Z41.9 SURGERY, ELECTIVE: ICD-10-CM

## 2023-03-23 PROCEDURE — 64494 INJ PARAVERT F JNT L/S 2 LEV: CPT | Performed by: ANESTHESIOLOGY

## 2023-03-23 PROCEDURE — 64493 INJ PARAVERT F JNT L/S 1 LEV: CPT | Performed by: ANESTHESIOLOGY

## 2023-03-23 PROCEDURE — 25510000001 IOPAMIDOL 61 % SOLUTION 30 ML VIAL: Performed by: ANESTHESIOLOGY

## 2023-03-23 PROCEDURE — 76000 FLUOROSCOPY <1 HR PHYS/QHP: CPT

## 2023-03-23 PROCEDURE — S0260 H&P FOR SURGERY: HCPCS | Performed by: ANESTHESIOLOGY

## 2023-03-23 PROCEDURE — 77002 NEEDLE LOCALIZATION BY XRAY: CPT

## 2023-03-23 NOTE — DISCHARGE INSTRUCTIONS
[facility] - Post-procedure Instructions          --  While there are no absolute restrictions, it is recommended that you do not perform strenuous activity today. In the morning, you may resume your level of activity as before your block.    --  If you have a band-aid at your injection site, please remove it later today. Observe the area for any redness, swelling, pus-like drainage, or a temperature over 101°. If any of these symptoms occur, please call your doctor at [phone]. If after office hours, leave a message and the on-call provider will return your call.    --  Ice may be applied to your injection site. It is recommended you avoid direct heat (heating pad; hot tub) for 1-2 days.    --  Call [facility] at [phone] if you experience persistent headache, persistent bleeding from the injection site, or severe pain not relieved by heat or oral medication.    --  Do not make important decisions today.    --  Due to the effects of the block and/or the I.V. Sedation, DO NOT drive or operate hazardous machinery for 12 hours.  Local anesthetics may cause numbness after procedure and precautions must be taken with regards to operating equipment as well as with walking, even if ambulating with assistance of another person or with an assistive device.    --  Do not drink alcohol for 12 hours.    -- You may return to work tomorrow, or as directed by your referring doctor.    --  Occasionally you may notice a slight increase in your pain after the procedure. This should start to improve within the next 24-48 hours. Radiofrequency ablation procedure pain may last 3-4 weeks.    --  It may take as long as 3-4 days before you notice a gradual improvement in your pain and/or other symptoms.    -- You may continue to take your prescribed pain medication as needed.    --  Some normal possible side effects of steroid use could include fluid retention, increased blood sugar, dull headache,  increased sweating, increased appetite, mood swings and flushing.    --  Diabetics are recommended to watch their blood glucose level closely for 24-48 hours after the injection.    --  Must stay in PACU for 20 min upon arrival and prove no leg weakness before being discharged.    --  IN THE EVENT OF A LIFE THREATENING EMERGENCY, (CHEST PAIN, BREATHING DIFFICULTIES, PARALYSIS…) YOU SHOULD GO TO YOUR NEAREST EMERGENCY ROOM.    --  You should be contacted by our office within 2-3 days to schedule follow up or next appointment date.  If not contacted within 7 days, please call the office at [phone]

## 2023-03-23 NOTE — INTERVAL H&P NOTE
H&P reviewed. The patient was examined and there are no changes to the H&P.  Exam under fluoro to follow.

## 2023-03-23 NOTE — OP NOTE
LEFT L3-5 Lumbar Medial Branch Blockade  [facility]    PREOPERATIVE DIAGNOSIS:  Lumbar spondylosis without myelopathy    POSTOPERATIVE DIAGNOSIS:  Lumbar spondylosis without myelopathy    PROCEDURE:   Diagnostic Left Lumbar Medial Branch Nerve Blockades, with fluoroscopy:  L3, L4, and L5 nerves (at the L4, L5 transverse processes and the sacral alar groove) to block facet joints L4-5, and L5-S1  1. 87546 -- Lumbar Facet block, 1st Level  2. 76694 -- Lumbar Facet block, 2nd  Level      PRE-PROCEDURE DISCUSSION WITH PATIENT:    Risks and complications were discussed with the patient prior to starting the procedure and informed consent was obtained.      SURGEON:   Jonathan Sharp MD    REASON FOR PROCEDURE:    Tenderness of the affected facet joints on exam under fluoroscopy and Painful area identified on exam under fluoroscopy    SEDATION:  Patient declined administration of moderate sedation    ANESTHETIC:  Marcaine 0.5%  STEROID:  NONE  TOTAL VOLUME OF SOLUTION:  3 mL    DESCRIPTON OF PROCEDURE:  After obtaining informed consent, IV access was not obtained in the preoperative area.   The patient was taken to the operating room.  The patient was placed in the prone position with a pillow under the abdomen. All pressure points were well padded.  EKG, blood pressure, and pulse oximeter were monitored.  The patient was monitored and sedated by the RN under my direction. The lumbosacral area was prepped with Chloraprep and draped in a sterile fashion. Under fluoroscopic guidance the transverse processes of the L4 and L5 vertebrae at the junctions of the superior articular processes were identified on the affected side.  Also identified was the groove between the ala and the superior articular process of the sacrum on the ipsilateral side.  Skin and subcutaneous tissue were anesthetized with 1% lidocaine above each of these points. A spinal needle was introduced under fluoroscopic guidance at the  above junctions. Aspiration was negative for blood and CSF.  After confirming the position of the needle with fluoroscope in all views, 0.25 mL of Omnipaque was injected, and after seeing the proper spread a total of 1 mL of the anesthetic solution noted above was injected at each of these points.  Needles were removed intact from each of the areas.   Onset of analgesia was noted.  Vital signs remained stable throughout.      ESTIMATED BLOOD LOSS:  <5 mL  SPECIMENS:  none    COMPLICATIONS:   No complications were noted., There was no indication of vascular uptake on live injection of contrast dye., There was no indication of intrathecal uptake on live injection of contrast dye., There was not any evidence of dural puncture.   and The patient did not have any signs of postprocedure numbness nor weakness.    TOLERANCE & DISCHARGE CONDITION:    The patient tolerated the procedure well.  The patient was transported to the recovery area without difficulties.  The patient was discharged to home under the care of family in stable and satisfactory condition.    PLAN OF CARE:  1. The patient was given our standard instruction sheet.  2. We discussed that Lumbar Medial Branch Blockade is a diagnostic procedure in consideration for radiofrequency ablation if two diagnostic procedures prove to be positive for significant benefit.  If sustained relief of 6 to eight weeks is obtained, then an alternative plan could be therapeutic lumbar branch blockades.  3. The patient is asked to keep a pain log each hour for 8 hours after the procedure today.  4. The patient will  Return to clinic 1 wk  5. The patient will resume all medications as per the medication reconciliation sheet.

## 2023-03-23 NOTE — H&P
Brief Pre-procedural / Pre-operative H&P        -----    Pertinent Diagnosis:   Lumbar spondylosis.  Lumbar facet arthropathy    Proposed Procedure: Lumbar medial branch blockade.      Subjective   Shanique Martinez is a 87 y.o. female  who presents for intervention.  She has a history of back pain.      History of Present Illness     She has axial limits her back pain and radicular elements.  Epidural injections without the radicular elements.  She has had less axial pain with epidurals but it there is always some constant level of pain and it flares frequently.  She has some noted quite significant facet disease on imaging.  She complains of aching in the back perhaps worse on the left than the right but it is bilaterally and increases with twisting and standing and also for walking long distances.  Neurosurgeons trying to have her to avoid surgery.  She uses a walker and she has some knee problems and she has been referred to physical therapy but her ability to ambulate is somewhat limited diagnostic blockade therefore indicated  -------    The following portions of the patient's history were reviewed and updated as appropriate: allergies, current medications, past family history, past medical history, past social history, past surgical history and problem list.    Allergies   Allergen Reactions   • Bactrim [Sulfamethoxazole-Trimethoprim] Rash       No current facility-administered medications for this encounter.    No current facility-administered medications on file prior to encounter.     Current Outpatient Medications on File Prior to Encounter   Medication Sig Dispense Refill   • atorvastatin (LIPITOR) 10 MG tablet TAKE ONE TABLET BY MOUTH DAILY 90 tablet 4   • CALCIUM PO Take 1 tablet by mouth Daily.     • prednisoLONE acetate (PRED FORTE) 1 % ophthalmic suspension          Patient Active Problem List   Diagnosis   • Hyperlipidemia LDL goal <130   • SI joint arthritis   • Arthritis   • Encounter for long-term  (current) drug use   • H/O hysterectomy for benign disease   • Menopause   • Dizziness   • Chronic left-sided low back pain with left-sided sciatica   • Stasis edema of both lower extremities   • Left lumbar radiculopathy   • Lumbar facet arthropathy       Past Medical History:   Diagnosis Date   • Abnormal skin growth 07/17/2017   • Arthritis    • Cancer (HCC) 2010    Melanoma: L FA    • History of bone density study 2008   • History of mammogram 11/01/2013   • History of mammogram 09/23/2011   • Hyperlipidemia        Past Surgical History:   Procedure Laterality Date   • COLONOSCOPY  2006   • EPIDURAL Left 10/27/2022    Procedure: LEFT L4 LUMBAR/SACRAL TRANSFORAMINAL EPIDURAL STEROID INJECTION;  Surgeon: Jonathan Sharp MD;  Location: [facility];  Service: Pain Management;  Laterality: Left;   • HYSTERECTOMY     • LUMBAR EPIDURAL INJECTION N/A 1/5/2023    Procedure: L4/L5 LUMBAR EPIDURAL STEROID INJECTION;  Surgeon: Jonathan Sharp MD;  Location: [facility];  Service: Pain Management;  Laterality: N/A;   • ORIF WRIST FRACTURE Left    • SKIN SURGERY  2000    L FA - melanoma, Dr Caban        Family History   Problem Relation Age of Onset   • Heart disease Mother    • Heart disease Father         Lived until he was 102 yoa    • Cancer Sister    • Stroke Sister    • Breast cancer Sister    • No Known Problems Brother    • No Known Problems Daughter    • No Known Problems Son    • No Known Problems Maternal Grandmother    • No Known Problems Paternal Grandmother    • No Known Problems Maternal Aunt    • No Known Problems Paternal Aunt    • BRCA 1/2 Neg Hx    • Colon cancer Neg Hx    • Endometrial cancer Neg Hx    • Ovarian cancer Neg Hx        Social History     Socioeconomic History   • Marital status:    Tobacco Use   • Smoking status: Never   • Smokeless tobacco: Never   Vaping Use   • Vaping Use: Never used   Substance and Sexual Activity   • Alcohol use: No   • Drug use: No   • Sexual activity:  "Defer       -------       Review of Systems  All other systems reviewed and are negative.  No Fever, No Chills, No ear pain, No sinus pressure or drainage, No eye pain or drainage, No cough, No SOB, No chest tightness nor chest pain, no palpitations.      ---          Vitals:    03/23/23 1438   BP: 126/85   BP Location: Left arm   Patient Position: Lying   Pulse: 99   Resp: 18   Temp: 98.7 °F (37.1 °C)   TempSrc: Temporal   SpO2: 96%   Weight: 81.6 kg (180 lb)   Height: 162.6 cm (64\")         Objective   Physical Exam  VSS, NNR, NCAT, NMNA, NRD, AAOx3.      -------    Assessment & Plan:  - as noted above, the stated intervention is indicated  - Follow-up plan will be noted in the operative report        Has a follow-up March 30      EMR Dragon/Transcription disclaimer:   Typed items in this encounter note may have been created by electronic transcription/translation software which converts spoken language to printed text. The electronic translation of spoken language may permit erroneous, or at times, nonsensical words or phrases to be inadvertently transcribed; Although I have reviewed the note for such errors, some may still exist.      "

## 2023-03-28 DIAGNOSIS — M54.42 CHRONIC LEFT-SIDED LOW BACK PAIN WITH LEFT-SIDED SCIATICA: ICD-10-CM

## 2023-03-28 DIAGNOSIS — G89.29 CHRONIC LEFT-SIDED LOW BACK PAIN WITH LEFT-SIDED SCIATICA: ICD-10-CM

## 2023-03-29 RX ORDER — ETODOLAC 200 MG/1
CAPSULE ORAL
Qty: 30 CAPSULE | Refills: 1 | OUTPATIENT
Start: 2023-03-29

## 2023-03-30 ENCOUNTER — PREP FOR SURGERY (OUTPATIENT)
Dept: SURGERY | Facility: SURGERY CENTER | Age: 88
End: 2023-03-30
Payer: MEDICARE

## 2023-03-30 ENCOUNTER — OFFICE VISIT (OUTPATIENT)
Dept: PAIN MEDICINE | Facility: CLINIC | Age: 88
End: 2023-03-30
Payer: MEDICARE

## 2023-03-30 VITALS
HEIGHT: 64 IN | WEIGHT: 183.6 LBS | TEMPERATURE: 98.4 F | OXYGEN SATURATION: 95 % | SYSTOLIC BLOOD PRESSURE: 124 MMHG | BODY MASS INDEX: 31.34 KG/M2 | HEART RATE: 99 BPM | DIASTOLIC BLOOD PRESSURE: 59 MMHG

## 2023-03-30 DIAGNOSIS — M48.061 NEURAL FORAMINAL STENOSIS OF LUMBAR SPINE: ICD-10-CM

## 2023-03-30 DIAGNOSIS — M54.16 LEFT LUMBAR RADICULOPATHY: Primary | ICD-10-CM

## 2023-03-30 DIAGNOSIS — G89.4 CHRONIC PAIN SYNDROME: ICD-10-CM

## 2023-03-30 DIAGNOSIS — S16.1XXA STRAIN OF NECK MUSCLE, INITIAL ENCOUNTER: ICD-10-CM

## 2023-03-30 DIAGNOSIS — M47.816 LUMBAR FACET ARTHROPATHY: ICD-10-CM

## 2023-03-30 DIAGNOSIS — M47.816 LUMBAR FACET ARTHROPATHY: Primary | ICD-10-CM

## 2023-03-30 PROCEDURE — 1160F RVW MEDS BY RX/DR IN RCRD: CPT

## 2023-03-30 PROCEDURE — 99214 OFFICE O/P EST MOD 30 MIN: CPT

## 2023-03-30 PROCEDURE — 1125F AMNT PAIN NOTED PAIN PRSNT: CPT

## 2023-03-30 PROCEDURE — 1159F MED LIST DOCD IN RCRD: CPT

## 2023-03-30 RX ORDER — SODIUM CHLORIDE 0.9 % (FLUSH) 0.9 %
10 SYRINGE (ML) INJECTION EVERY 12 HOURS SCHEDULED
OUTPATIENT
Start: 2023-03-30

## 2023-03-30 RX ORDER — ETODOLAC 200 MG/1
CAPSULE ORAL
Qty: 90 CAPSULE | Refills: 0 | Status: SHIPPED | OUTPATIENT
Start: 2023-03-30

## 2023-03-30 RX ORDER — HYDROCODONE BITARTRATE AND ACETAMINOPHEN 5; 325 MG/1; MG/1
1 TABLET ORAL EVERY 4 HOURS PRN
Qty: 18 TABLET | Refills: 0 | Status: SHIPPED | OUTPATIENT
Start: 2023-03-30

## 2023-03-30 RX ORDER — SODIUM CHLORIDE 0.9 % (FLUSH) 0.9 %
10 SYRINGE (ML) INJECTION AS NEEDED
OUTPATIENT
Start: 2023-03-30

## 2023-03-30 NOTE — PROGRESS NOTES
CHIEF COMPLAINT  Back pain    Subjective   Shanique Martinez is a 87 y.o. female  who presents to the office for follow-up of procedure.  She completed a Left L3-L5 MBB on 3/23/2023 performed by Dr. Sharp for management of back pain. Patient reports 80% relief from the procedure x 4 days. She reports that she was able to stand longer and walk further following the procedure.    Today pain is 2/10VAS in severity. Pain is located in her low back, mostly on the left side. Pain radiates down left lateral/posterior thigh. Describes this pain as an intermittent ache. She reports that the radiating pain has been less frequent since her epidural. Pain is worsened by increased physical activity, household chores  that require twisting or prolonged standing, and walking long distances. Pain improves with rest/reposition, use of a TENS unit, and medications. Continues with Etodolac 200mg TID, Tylenol Arthritis PRN and Flexeril 10mg 2-3/day.      Patient saw Dr. Ludwig St on 8/2/2022.  In review of his note he noted that he would recommend that patient try to avoid surgery if at all possible.  He suggested epidural blocks and physical therapy.       She gets bilateral knee gel injections with Dr. Clark every 6 months. She had injections last week and while there was given a referral for PT.     Reviewed ED note from 3/5/2023.  Patient presented with complaints of worsening neck pain.  She reports pain is located in her left trapezius area.  Also radiates from base of skull down her neck and into her left shoulder.  Pain worsens with movement.  Denies tingling/numbness.  Denies chest pain shortness of breath.  Cervical xray completed.  Patient was given morphine and Zofran. She was instructed to follow-up with her primary care provider.    Procedures:  3/23/23 - Left L3-L5 MBB -  80% relief x 4 days  1/5/23 - L4/L5 LESI - 80% ongoing relief  10/27/22 - Left L4 TF LESI - 95% relief x 2 weeks    Back Pain  This is a  chronic problem. The problem occurs intermittently. The problem has been waxing and waning since onset. The pain is present in the lumbar spine (L side > R side). The quality of the pain is described as aching. The pain radiates to the left thigh (left lateral/posterior thigh). The pain is at a severity of 2/10. The symptoms are aggravated by standing and position (increased physical activity, prolonged standing, walking long distances). Pertinent negatives include no abdominal pain, chest pain, dysuria, fever, headaches or numbness. Weakness: legs. Risk factors include sedentary lifestyle. She has tried muscle relaxant and NSAIDs (rest/reposition, PT in the past, TENS unit, Tylenol, Lodine, Flexeril) for the symptoms.      PEG Assessment   What number best describes your pain on average in the past week?5  What number best describes how, during the past week, pain has interfered with your enjoyment of life?5  What number best describes how, during the past week, pain has interfered with your general activity?  4    Review of Pertinent Medical Data ---  RADIOLOGY REPORT     FACILITY:  [facility]   UNIT/AGE/GENDER: ND.MRI  OP      AGE:86 Y          SEX:F   PATIENT NAME/:  CATHLEEN GALE S    1935   UNIT NUMBER:  [id number]   ACCOUNT NUMBER:  [id number]   ACCESSION NUMBER:  [id number]     MRI lumbar spine without contrast,     2022 at 0837 hours     HISTORY: Low back pain.         TECHNIQUE: Multiplanar multisequence imaging was obtained of the lumbar spine on a 3 Ruby magnet. Images were obtained without IV gadolinium.     COMPARISON: No     FINDINGS: The conus terminates at L1. The signal within the conus is within normal limits. The cauda equina is unremarkable.     There is moderate dextroscoliosis with the apex at L3. There is left lateral translation of L2 on L3. There is 3 mm anterolisthesis of L4 and L5 secondary to facet disease. There is mild-to-moderate multilevel  degenerative disc disease. No fracture or   pars defect is demonstrated. The paravertebral soft tissues are unremarkable.     At T11-T12 broad-based disc bulge and facet disease resulting in moderate right neuroforaminal narrowing     T12-L1: A broad-based disc bulge and facet disease resulting in mild-to-moderate right neuroforaminal narrowing     L1-L2: A broad-based disc bulge, posterior osseous lipping and facet disease resulting in moderate left neuroforaminal narrowing.     L2-L3: A broad-based disc bulge, posterior osteophytes with facet disease resulting in moderate left neuroforaminal narrowing.     L3-L4: A broad-based disc bulge and facet disease resulting in moderate left neuroforaminal narrowing.     L4-L5: There is 3 mm anterolisthesis of L4 and L5 secondary to severe facet disease. There is uncovering of the disc. There is severe left-sided neuroforaminal narrowing and mild spinal stenosis.     L5-S1: A broad-based disc bulge and facet disease result in mild bilateral neuroforaminal narrowing     IMPRESSION:     1. Moderate dextroscoliosis with the apex at L3. There is left lateral translation of L2 on L3.   2. There is 3 mm anterolisthesis of L4 and L5 secondary to facet disease. Uncovering of the disc is resulting in severe left neuroforaminal narrowing and mild spinal stenosis.   3. Mild-to-moderate degenerative spondylosis.   4. At L1-L2 and L2-L3 degenerative spondylosis resulting in moderate left neuroforaminal narrowing     Dictated by: Patricia Mcgee M.D.     Images and Report reviewed and interpreted by: Patricia Mcgee M.D.     <PS><Electronically signed by: Patricia Mcgee M.D.>   04/23/2022 1230       The following portions of the patient's history were reviewed and updated as appropriate: allergies, current medications, past family history, past medical history, past social history, past surgical history and problem list.    Review of Systems   Constitutional: Negative for chills,  "fatigue and fever.   Respiratory: Negative for cough and shortness of breath.    Cardiovascular: Negative for chest pain.   Gastrointestinal: Negative for abdominal pain, constipation and diarrhea.   Genitourinary: Negative for difficulty urinating and dysuria.   Musculoskeletal: Positive for back pain. Gait problem: walks with a walker.   Neurological: Negative for dizziness, light-headedness, numbness and headaches. Weakness: legs.   Psychiatric/Behavioral: Negative for sleep disturbance and suicidal ideas.     I have reviewed and confirmed the accuracy of the ROS as documented by the MA/LPN/RN LALITHA Best     Vitals:    03/30/23 1343   BP: 124/59   BP Location: Left arm   Pulse: 99   Temp: 98.4 °F (36.9 °C)   TempSrc: Temporal   SpO2: 95%   Weight: 83.3 kg (183 lb 9.6 oz)   Height: 162.6 cm (64\")   PainSc:   2   PainLoc: Back     Objective   Physical Exam  Constitutional:       Appearance: Normal appearance.   HENT:      Head: Normocephalic.   Cardiovascular:      Rate and Rhythm: Normal rate and regular rhythm.   Pulmonary:      Effort: Pulmonary effort is normal.      Breath sounds: Normal breath sounds.   Musculoskeletal:      Cervical back: Tenderness (left side) present. Pain with movement and muscular tenderness present. Decreased range of motion.      Lumbar back: No tenderness or bony tenderness. Positive left straight leg raise test. Negative right straight leg raise test.      Comments: + lumbar facet loading/tenderness   Skin:     General: Skin is warm and dry.      Capillary Refill: Capillary refill takes less than 2 seconds.   Neurological:      General: No focal deficit present.      Mental Status: She is alert and oriented to person, place, and time.      Gait: Gait abnormal (slowed, ambulates with walker).   Psychiatric:         Mood and Affect: Mood normal.         Behavior: Behavior normal.         Thought Content: Thought content normal.         Cognition and Memory: Cognition " "normal.       Assessment & Plan   Diagnoses and all orders for this visit:    1. Left lumbar radiculopathy (Primary)    2. Lumbar facet arthropathy  -     HYDROcodone-acetaminophen (NORCO) 5-325 MG per tablet; Take 1 tablet by mouth Every 4 (Four) Hours As Needed (Pain).  Dispense: 18 tablet; Refill: 0    3. Chronic pain syndrome    4. Neural foraminal stenosis of lumbar spine    5. Strain of neck muscle, initial encounter    --- Repeat Left L3-L5 MBB - scheduled for 4/20/23   -------  Education about Medial Branch Blockade and RF Therapy:  This medial branch blockade (MBB) suggested is intended for diagnostic purposes, with the intent of offering the patient Radiofrequency thermal rhizotomy (RF) if the MBB is diagnostically effective.  The diagnostic blockade is necessary to determine the likelihood that RF therapy could be efficacious in providing long term relief to the patient.    Medial branches are sensory nerve branches that connect to a facet joint and transmit sensations & pain signals from that joint.  Facet is a term for the type of joints found in the spine.  Medial branches are the nerves that go to a facet, and therefore are also sometimes called \"facet joint nerves\" (FJNs).      In a medial branch blockade procedure, xray fluoroscopy is used to verify the locations of the outside of the joint lines which are being targeted.  Under xray guidance, needles are placed to these areas.  Contrast dye is injected to confirm proper placement, with dye flowing over the joint area, and to ensure that the dye does not flow into unintended areas such as a vein.  When this is confirmed, local anesthetic is injected to block the medial branch at that joint level.      If MBBs are diagnostically successful in blocking pain, then the patient is most likely a great candidate for Radiofrequency of those facet joint nerves.  In the RF procedure, needles are placed to the joint lines in the same fashion, and after " testing, the needle tips are heated to thermally treat the nerves, blocking the nerves by in essence damaging the nerves with the heat treatment(non-pulsed).       Medically, a successful RF procedure should provide a patient with 50% pain relief or more for at least 6 months.  Clinical experience suggests that successful patients receive relief more in the range of 12 months on average.  We also discussed that a fortunate minority of patients receive therapeutic success from the MBB, and may not require RF ablation.  If a patient receives more than 8 weeks of relief from MBB, then occasional repeat MBB for therapeutic purposes is a very reasonable alternative therapy.  This course of therapy is consistent with our LCDs according to our CMS  in the area, and therefore other insurance providers should follow accordingly.  We will monitor our patients to screen for these therapeutic responders and will offer RF therapy only when necessary.      We discussed that MBB & RF are not without risks.  Guidelines regarding anticoagulant use & neuraxial procedures will be respected.  Patients that are ill or otherwise may be at risk for sepsis will not have their spines accessed by neuraxial injections of any type.  This patient will not be offered these therapies if there is an increased risk.   We discussed that there is a risk of postprocedural pain and also a risk of worsening of clinical picture with these procedures as with any neuraxial procedure.    -------  --- Acute supply of Hydrocodone 5mg (#18) sent to pharmacy. Discussed with patient that no further acute supplies will be given. If further refills are needed, patient will need UDS, CSA, ORT, and SOAPP completed. Patient verbalized understanding.   --- Follow-up for procedure     Diagnostic Facet Joint Procedure:   MBB   The confirmatory diagnostic facet joint procedure is considered medically reasonable and necessary for the diagnosis and treatment  of chronic pain for this patient due to the patient meeting all of the following criteria:    1. Moderate to severe chronic neck or low back pain, predominantly axial, that causes functional deficit measured on pain or disability scale.  2. Pain present for minimum of 3 months with documented failure to respond to noninvasive conservative management (as tolerated)  3. Absence of untreated radiculopathy or neurogenic claudication (except for radiculopathy caused by facet joint synovial cyst)  4. There is no non-facet pathology per clinical assessment or radiology studies that could explain the source of the patient’s pain, including but not limited to fracture, tumor, infection, or significant deformity.    The patient has also shown a consistent positive response of at least 80% relief of primary (index) pain (with the duration of relief being consistent with the agent used).    ELE REPORT  As part of the patient's treatment plan, I am prescribing controlled substances. The patient has been made aware of appropriate use of such medications, including potential risk of somnolence, limited ability to drive and/or work safely, and the potential for dependence or overdose. It has also been made clear that these medications are for use by this patient only, without concomitant use of alcohol or other substances unless prescribed.     Patient has completed prescribing agreement detailing terms of continued prescribing of controlled substances, including monitoring ELE reports, urine drug screening, and pill counts if necessary. The patient is aware that inappropriate use will results in cessation of prescribing such medications.    As the clinician, I personally reviewed the ELE from 3/30/23 while the patient was in the office today.    History and physical exam exhibit continued safe and appropriate use of controlled substances.    Dictated utilizing Dragon dictation.

## 2023-03-31 ENCOUNTER — OFFICE VISIT (OUTPATIENT)
Dept: WOUND CARE | Facility: HOSPITAL | Age: 88
End: 2023-03-31
Payer: MEDICARE

## 2023-03-31 ENCOUNTER — LAB REQUISITION (OUTPATIENT)
Dept: LAB | Facility: HOSPITAL | Age: 88
End: 2023-03-31
Payer: MEDICARE

## 2023-03-31 DIAGNOSIS — Z00.00 ENCOUNTER FOR GENERAL ADULT MEDICAL EXAMINATION WITHOUT ABNORMAL FINDINGS: ICD-10-CM

## 2023-03-31 PROCEDURE — 87075 CULTR BACTERIA EXCEPT BLOOD: CPT | Performed by: NURSE PRACTITIONER

## 2023-03-31 PROCEDURE — 87186 SC STD MICRODIL/AGAR DIL: CPT | Performed by: NURSE PRACTITIONER

## 2023-03-31 PROCEDURE — 87147 CULTURE TYPE IMMUNOLOGIC: CPT | Performed by: NURSE PRACTITIONER

## 2023-03-31 PROCEDURE — 97602 WOUND(S) CARE NON-SELECTIVE: CPT

## 2023-03-31 PROCEDURE — 87070 CULTURE OTHR SPECIMN AEROBIC: CPT | Performed by: NURSE PRACTITIONER

## 2023-03-31 PROCEDURE — 87205 SMEAR GRAM STAIN: CPT | Performed by: NURSE PRACTITIONER

## 2023-03-31 PROCEDURE — G0463 HOSPITAL OUTPT CLINIC VISIT: HCPCS

## 2023-04-02 LAB
BACTERIA SPEC AEROBE CULT: ABNORMAL
GRAM STN SPEC: ABNORMAL
GRAM STN SPEC: ABNORMAL

## 2023-04-03 ENCOUNTER — TRANSCRIBE ORDERS (OUTPATIENT)
Dept: ADMINISTRATIVE | Facility: HOSPITAL | Age: 88
End: 2023-04-03
Payer: MEDICARE

## 2023-04-03 DIAGNOSIS — Z12.31 BREAST CANCER SCREENING BY MAMMOGRAM: Primary | ICD-10-CM

## 2023-04-05 LAB — BACTERIA SPEC ANAEROBE CULT: NORMAL

## 2023-04-13 ENCOUNTER — OFFICE VISIT (OUTPATIENT)
Dept: WOUND CARE | Facility: HOSPITAL | Age: 88
End: 2023-04-13
Payer: MEDICARE

## 2023-04-13 PROCEDURE — G0463 HOSPITAL OUTPT CLINIC VISIT: HCPCS

## 2023-04-17 DIAGNOSIS — G89.29 CHRONIC LEFT-SIDED LOW BACK PAIN WITH LEFT-SIDED SCIATICA: ICD-10-CM

## 2023-04-17 DIAGNOSIS — M54.42 CHRONIC LEFT-SIDED LOW BACK PAIN WITH LEFT-SIDED SCIATICA: ICD-10-CM

## 2023-04-17 RX ORDER — ETODOLAC 200 MG/1
CAPSULE ORAL
Qty: 90 CAPSULE | Refills: 0 | OUTPATIENT
Start: 2023-04-17

## 2023-04-18 ENCOUNTER — TRANSCRIBE ORDERS (OUTPATIENT)
Dept: SURGERY | Facility: SURGERY CENTER | Age: 88
End: 2023-04-18
Payer: MEDICARE

## 2023-04-18 DIAGNOSIS — Z41.9 SURGERY, ELECTIVE: Primary | ICD-10-CM

## 2023-04-20 ENCOUNTER — HOSPITAL ENCOUNTER (OUTPATIENT)
Facility: SURGERY CENTER | Age: 88
Setting detail: HOSPITAL OUTPATIENT SURGERY
Discharge: HOME | End: 2023-04-20
Attending: ANESTHESIOLOGY | Admitting: ANESTHESIOLOGY
Payer: MEDICARE

## 2023-04-20 ENCOUNTER — HOSPITAL ENCOUNTER (OUTPATIENT)
Dept: GENERAL RADIOLOGY | Facility: SURGERY CENTER | Age: 88
Setting detail: HOSPITAL OUTPATIENT SURGERY
End: 2023-04-20
Payer: MEDICARE

## 2023-04-20 DIAGNOSIS — Z41.9 SURGERY, ELECTIVE: ICD-10-CM

## 2023-04-20 PROCEDURE — 77002 NEEDLE LOCALIZATION BY XRAY: CPT

## 2023-04-24 ENCOUNTER — TELEPHONE (OUTPATIENT)
Dept: PAIN MEDICINE | Facility: CLINIC | Age: 88
End: 2023-04-24
Payer: MEDICARE

## 2023-04-24 DIAGNOSIS — M47.816 LUMBAR FACET ARTHROPATHY: ICD-10-CM

## 2023-04-24 RX ORDER — HYDROCODONE BITARTRATE AND ACETAMINOPHEN 5; 325 MG/1; MG/1
1 TABLET ORAL EVERY 4 HOURS PRN
Qty: 18 TABLET | Refills: 0 | Status: SHIPPED | OUTPATIENT
Start: 2023-04-24

## 2023-04-24 NOTE — TELEPHONE ENCOUNTER
Ms. Martinez's daughter Hermila call and left a message asking if her mother could get another refill on the hydro/apap. She states that her procedure was canceled by Dr. Sharp on 4/20/2023.

## 2023-04-24 NOTE — TELEPHONE ENCOUNTER
I will send in one more acute supply due to her procedure being canceled. If she were to need further refills, she will need to be seen in office for a UDS and CSA.

## 2023-04-24 NOTE — TELEPHONE ENCOUNTER
Reviewed ELE. Refill appropriate. Will send acute supply do to procedure being canceled on 4/20/23 and rescheduled to 5/25/23.

## 2023-04-25 ENCOUNTER — TRANSCRIBE ORDERS (OUTPATIENT)
Dept: SURGERY | Facility: SURGERY CENTER | Age: 88
End: 2023-04-25
Payer: MEDICARE

## 2023-04-25 DIAGNOSIS — Z41.9 SURGERY, ELECTIVE: Primary | ICD-10-CM

## 2023-04-26 DIAGNOSIS — G89.29 CHRONIC LEFT-SIDED LOW BACK PAIN WITH LEFT-SIDED SCIATICA: ICD-10-CM

## 2023-04-26 DIAGNOSIS — M54.42 CHRONIC LEFT-SIDED LOW BACK PAIN WITH LEFT-SIDED SCIATICA: ICD-10-CM

## 2023-04-27 ENCOUNTER — TELEPHONE (OUTPATIENT)
Dept: PAIN MEDICINE | Facility: CLINIC | Age: 88
End: 2023-04-27

## 2023-04-27 RX ORDER — ETODOLAC 200 MG/1
CAPSULE ORAL
Qty: 90 CAPSULE | Refills: 0 | Status: SHIPPED | OUTPATIENT
Start: 2023-04-27

## 2023-04-27 RX ORDER — CYCLOBENZAPRINE HCL 10 MG
TABLET ORAL
Qty: 90 TABLET | Refills: 0 | Status: SHIPPED | OUTPATIENT
Start: 2023-04-27

## 2023-04-27 NOTE — TELEPHONE ENCOUNTER
Caller: tomasz devine    Relationship to patient: Emergency Contact    Best call back number: [phone]    Patient is needing: WOULD LIKE TO KNOW IF ANY OF THE F/U THAT ARE SCHEDULE ARE THE ABLATION RESCHEDULED ROM 5.25.23. IF NOT WHEN WILL THAT BE RESCHEDULED

## 2023-05-02 ENCOUNTER — OFFICE VISIT (OUTPATIENT)
Dept: WOUND CARE | Facility: HOSPITAL | Age: 88
End: 2023-05-02
Payer: MEDICARE

## 2023-05-02 ENCOUNTER — OFFICE VISIT (OUTPATIENT)
Dept: PAIN MEDICINE | Facility: CLINIC | Age: 88
End: 2023-05-02
Payer: MEDICARE

## 2023-05-02 VITALS
SYSTOLIC BLOOD PRESSURE: 143 MMHG | TEMPERATURE: 97.8 F | HEIGHT: 64 IN | BODY MASS INDEX: 31 KG/M2 | DIASTOLIC BLOOD PRESSURE: 82 MMHG | HEART RATE: 88 BPM | WEIGHT: 181.6 LBS | RESPIRATION RATE: 18 BRPM

## 2023-05-02 DIAGNOSIS — M47.816 LUMBAR FACET ARTHROPATHY: Primary | ICD-10-CM

## 2023-05-02 DIAGNOSIS — G89.4 CHRONIC PAIN SYNDROME: ICD-10-CM

## 2023-05-02 DIAGNOSIS — M54.16 LEFT LUMBAR RADICULOPATHY: ICD-10-CM

## 2023-05-02 DIAGNOSIS — M48.061 NEURAL FORAMINAL STENOSIS OF LUMBAR SPINE: ICD-10-CM

## 2023-05-02 DIAGNOSIS — Z79.899 ENCOUNTER FOR LONG-TERM (CURRENT) DRUG USE: ICD-10-CM

## 2023-05-02 LAB
POC AMPHETAMINES: NEGATIVE
POC BARBITURATES: NEGATIVE
POC BENZODIAZEPHINES: NEGATIVE
POC COCAINE: NEGATIVE
POC METHADONE: NEGATIVE
POC METHAMPHETAMINE SCREEN URINE: NEGATIVE
POC OPIATES: POSITIVE
POC OXYCODONE: NEGATIVE
POC PHENCYCLIDINE: NEGATIVE
POC PROPOXYPHENE: NEGATIVE
POC THC: NEGATIVE
POC TRICYCLIC ANTIDEPRESSANTS: NEGATIVE

## 2023-05-02 PROCEDURE — 17250 CHEM CAUT OF GRANLTJ TISSUE: CPT

## 2023-05-02 RX ORDER — HYDROCODONE BITARTRATE AND ACETAMINOPHEN 5; 325 MG/1; MG/1
1 TABLET ORAL 2 TIMES DAILY PRN
Qty: 45 TABLET | Refills: 0 | Status: SHIPPED | OUTPATIENT
Start: 2023-05-02
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
## 2023-05-08 ENCOUNTER — OFFICE VISIT (OUTPATIENT)
Dept: WOUND CARE | Facility: HOSPITAL | Age: 88
End: 2023-05-08
Payer: MEDICARE

## 2023-05-08 PROCEDURE — G0463 HOSPITAL OUTPT CLINIC VISIT: HCPCS

## 2023-05-09 ENCOUNTER — TELEPHONE (OUTPATIENT)
Dept: PAIN MEDICINE | Facility: CLINIC | Age: 88
End: 2023-05-09
Payer: MEDICARE

## 2023-05-15 ENCOUNTER — TRANSCRIBE ORDERS (OUTPATIENT)
Dept: SURGERY | Facility: SURGERY CENTER | Age: 88
End: 2023-05-15
Payer: MEDICARE

## 2023-05-15 DIAGNOSIS — Z41.9 SURGERY, ELECTIVE: Primary | ICD-10-CM

## 2023-05-17 ENCOUNTER — OFFICE VISIT (OUTPATIENT)
Dept: INTERNAL MEDICINE | Facility: CLINIC | Age: 88
End: 2023-05-17
Payer: MEDICARE

## 2023-05-17 ENCOUNTER — HOSPITAL ENCOUNTER (OUTPATIENT)
Dept: MAMMOGRAPHY | Facility: HOSPITAL | Age: 88
Discharge: HOME | End: 2023-05-17
Admitting: NURSE PRACTITIONER
Payer: MEDICARE

## 2023-05-17 VITALS
HEART RATE: 92 BPM | OXYGEN SATURATION: 92 % | WEIGHT: 179.4 LBS | HEIGHT: 64 IN | DIASTOLIC BLOOD PRESSURE: 80 MMHG | SYSTOLIC BLOOD PRESSURE: 130 MMHG | BODY MASS INDEX: 30.63 KG/M2

## 2023-05-17 DIAGNOSIS — E78.5 HYPERLIPIDEMIA LDL GOAL <130: Primary | Chronic | ICD-10-CM

## 2023-05-17 DIAGNOSIS — I87.303 STASIS EDEMA OF BOTH LOWER EXTREMITIES: ICD-10-CM

## 2023-05-17 DIAGNOSIS — G89.29 CHRONIC LEFT-SIDED LOW BACK PAIN WITH LEFT-SIDED SCIATICA: Chronic | ICD-10-CM

## 2023-05-17 DIAGNOSIS — E83.52 HYPERCALCEMIA: ICD-10-CM

## 2023-05-17 DIAGNOSIS — Z00.00 MEDICARE ANNUAL WELLNESS VISIT, SUBSEQUENT: ICD-10-CM

## 2023-05-17 DIAGNOSIS — Z12.31 BREAST CANCER SCREENING BY MAMMOGRAM: ICD-10-CM

## 2023-05-17 DIAGNOSIS — M54.42 CHRONIC LEFT-SIDED LOW BACK PAIN WITH LEFT-SIDED SCIATICA: Chronic | ICD-10-CM

## 2023-05-17 DIAGNOSIS — M19.90 ARTHRITIS: Chronic | ICD-10-CM

## 2023-05-17 PROCEDURE — 77067 SCR MAMMO BI INCL CAD: CPT

## 2023-05-17 PROCEDURE — 77063 BREAST TOMOSYNTHESIS BI: CPT

## 2023-05-17 NOTE — PATIENT INSTRUCTIONS
Medicare Wellness  Personal Prevention Plan of Service     Date of Office Visit:    Encounter Provider:  Alvarez Ron III, NP-C  Place of Service:  [facility]  Patient Name: Shanique Martinez  :  1935    As part of the Medicare Wellness portion of your visit today, we are providing you with this personalized preventive plan of services (PPPS). This plan is based upon recommendations of the [place] Preventive Services Task Force (USPSTF) and the Advisory Committee on Immunization Practices (ACIP).    This lists the preventive care services that should be considered, and provides dates of when you are due. Items listed as completed are up-to-date and do not require any further intervention.    Health Maintenance   Topic Date Due    URINE MICROALBUMIN  Never done    ZOSTER VACCINE (2 of 3) 2013    Pneumococcal Vaccine 65+ (2 - PCV) 10/02/2013    ANNUAL WELLNESS VISIT  2023    LIPID PANEL  2023    HEMOGLOBIN A1C  2023    INFLUENZA VACCINE  2023    DIABETIC EYE EXAM  2024    DXA SCAN  2024    TDAP/TD VACCINES (2 - Tdap) 2032    COVID-19 Vaccine  Completed       Orders Placed This Encounter   Procedures    Comprehensive Metabolic Panel     Order Specific Question:   Release to patient     Answer:   Routine Release    Lipid Panel With LDL / HDL Ratio     Order Specific Question:   Release to patient     Answer:   Routine Release    CBC (No Diff)     Order Specific Question:   Release to patient     Answer:   Routine Release       Return in about 6 months (around 2023).

## 2023-05-17 NOTE — PROGRESS NOTES
The ABCs of the Annual Wellness Visit  Subsequent Medicare Wellness Visit    Subjective    Shanique Martinez is a 87 y.o. female who presents for a Subsequent Medicare Wellness Visit.    The following portions of the patient's history were reviewed and   updated as appropriate: allergies, current medications, past family history, past medical history, past social history, past surgical history and problem list.    Wt Readings from Last 4 Encounters:   05/17/23 81.4 kg (179 lb 6.4 oz)   05/02/23 82.4 kg (181 lb 9.6 oz)   03/30/23 83.3 kg (183 lb 9.6 oz)   03/23/23 81.6 kg (180 lb)       Weight trend is stable.    Her cardiovascular risks are:    [] No Known risk factors    [] Hypertension    [] Hyperlipidemia  [] Diabetes     [] Obesity  [x] Family history    [] Current or hx tobacco use  [x] Sedentary lifestyle       Male:   [] Testosterone use     Mobility    [] Ambulates independently  [] Ambulates with cane   [] Ambulates with quad cane  [] Ambulates with rollator   [x] Ambulates with walker (has two at home)    [] Mobile with wheelchair   [] Mobile with electric wheelchair     Continence    [x] Continent of bowel and bladder  [] Incontinent bowel and bladder   [] Incontinent bladder   [] Incontinent bowel          Compared to one year ago, the patient feels her physical   health is the same.    Compared to one year ago, the patient feels her mental   health is the same.    Recent Hospitalizations:  She was not admitted to the hospital during the last year.       Current Medical Providers:  Patient Care Team:  Alvarez Ron III NP-C as PCP - General (Family Medicine)  Sigrid Clark MD as Consulting Physician (Orthopedic Surgery)  Kanika Montoya PA (Physician Assistant)    Outpatient Medications Prior to Visit   Medication Sig Dispense Refill   • atorvastatin (LIPITOR) 10 MG tablet TAKE ONE TABLET BY MOUTH DAILY 90 tablet 4   • CALCIUM PO Take 1 tablet by mouth Daily.     • cyclobenzaprine  "(FLEXERIL) 10 MG tablet TAKE ONE TABLET BY MOUTH THREE TIMES A DAY 90 tablet 0   • etodolac (LODINE) 200 MG capsule TAKE ONE CAPSULE BY MOUTH THREE TIMES A DAY 90 capsule 0   • furosemide (LASIX) 20 MG tablet TAKE ONE TABLET BY MOUTH EVERY MORNING 90 tablet 1   • HYDROcodone-acetaminophen (NORCO) 5-325 MG per tablet Take 1 tablet by mouth 2 (Two) Times a Day As Needed (Pain). 30 day supply 45 tablet 0   • potassium chloride (K-DUR,KLOR-CON) 10 MEQ CR tablet TAKE ONE TABLET BY MOUTH DAILY 90 tablet 1   • prednisoLONE acetate (PRED FORTE) 1 % ophthalmic suspension        No facility-administered medications prior to visit.       Opioid medication/s are on active medication list.  and I have evaluated her active treatment plan and pain score trends (see table).  Vitals:    05/17/23 1115   PainSc:   6   PainLoc: Back     I have reviewed the chart for potential of high risk medication and harmful drug interactions in the elderly.            Aspirin is not on active medication list.  Aspirin use is not indicated based on review of current medical condition/s. Risk of harm outweighs potential benefits.  .    Patient Active Problem List   Diagnosis   • Hyperlipidemia LDL goal <130   • SI joint arthritis   • Arthritis   • Encounter for long-term (current) drug use   • H/O hysterectomy for benign disease   • Menopause   • Dizziness   • Chronic left-sided low back pain with left-sided sciatica   • Stasis edema of both lower extremities   • Left lumbar radiculopathy   • Lumbar facet arthropathy     Advance Care Planning  Advance Directive is on file.  ACP discussion was held with the patient during this visit. Patient has an advance directive in EMR which is still valid.      Objective    Vitals:    05/17/23 1115   BP: 130/80   BP Location: Left arm   Patient Position: Sitting   Cuff Size: Adult   Pulse: 92   SpO2: 92%   Weight: 81.4 kg (179 lb 6.4 oz)   Height: 162.6 cm (64\")   PainSc:   6   PainLoc: Back     Estimated body " "mass index is 30.79 kg/m² as calculated from the following:    Height as of this encounter: 162.6 cm (64\").    Weight as of this encounter: 81.4 kg (179 lb 6.4 oz).    BMI is >= 30 and <35. (Class 1 Obesity). The following options were offered after discussion;: exercise counseling/recommendations and nutrition counseling/recommendations      Does the patient have evidence of cognitive impairment? Yes          HEALTH RISK ASSESSMENT    Smoking Status:  Social History     Tobacco Use   Smoking Status Never   Smokeless Tobacco Never     Alcohol Consumption:  Social History     Substance and Sexual Activity   Alcohol Use No     Fall Risk Screen:    STEADI Fall Risk Assessment was completed, and patient is at HIGH risk for falls. Assessment completed on:2023    Depression Screenin/17/2023    11:17 AM   PHQ-2/PHQ-9 Depression Screening   Little Interest or Pleasure in Doing Things 0-->not at all   Feeling Down, Depressed or Hopeless 0-->not at all   PHQ-9: Brief Depression Severity Measure Score 0       Health Habits and Functional and Cognitive Screenin/17/2023    11:18 AM   Functional & Cognitive Status   Do you have difficulty preparing food and eating? No   Do you have difficulty bathing yourself, getting dressed or grooming yourself? No   Do you have difficulty using the toilet? No   Do you have difficulty moving around from place to place? Yes   Do you have trouble with steps or getting out of a bed or a chair? Yes   Current Diet Well Balanced Diet   Dental Exam Up to date   Eye Exam Up to date   Exercise (times per week) 0 times per week   Current Exercises Include No Regular Exercise   Do you need help using the phone?  No   Are you deaf or do you have serious difficulty hearing?  No   Do you need help with transportation? No   Do you need help shopping? Yes   Do you need help preparing meals?  No   Do you need help with housework?  No   Do you need help with laundry? No   Do you need help " taking your medications? No   Do you need help managing money? No   Do you ever drive or ride in a car without wearing a seat belt? No   Have you felt unusual stress, anger or loneliness in the last month? No   Who do you live with? Alone   If you need help, do you have trouble finding someone available to you? No   Have you been bothered in the last four weeks by sexual problems? No   Do you have difficulty concentrating, remembering or making decisions? Yes       Age-appropriate Screening Schedule:  Refer to the list below for future screening recommendations based on patient's age, sex and/or medical conditions. Orders for these recommended tests are listed in the plan section. The patient has been provided with a written plan.    Health Maintenance   Topic Date Due   • URINE MICROALBUMIN  Never done   • ZOSTER VACCINE (2 of 3) 04/03/2013   • Pneumococcal Vaccine 65+ (2 - PCV) 10/02/2013   • ANNUAL WELLNESS VISIT  02/09/2023   • LIPID PANEL  02/09/2023   • HEMOGLOBIN A1C  02/22/2023   • INFLUENZA VACCINE  08/01/2023   • DIABETIC EYE EXAM  01/24/2024   • DXA SCAN  02/09/2024   • TDAP/TD VACCINES (2 - Tdap) 03/02/2032   • COVID-19 Vaccine  Completed                CMS Preventative Services Quick Reference  Risk Factors Identified During Encounter  Chronic Pain: Natural history and expected course discussed. Questions answered.  current with pain management   Fall Risk-High or Moderate: Discussed Fall Prevention in the home      The above risks/problems have been discussed with the patient.  Pertinent information has been shared with the patient in the After Visit Summary.  An After Visit Summary and PPPS were made available to the patient.    Follow Up:   Next Medicare Wellness visit to be scheduled in 1 year.       Additional E&M Note during same encounter follows:  Patient has multiple medical problems which are significant and separately identifiable that require additional work above and beyond the Medicare  Wellness Visit.      Chief Complaint  Medicare Wellness-subsequent    Subjective        Shanique Martinez is also being seen today for AWV and follow up chronic conditions: She is here with daughter today.     Hyperlipidemia  This is a chronic problem. The current episode started more than 1 year ago. The problem is controlled. She has no history of diabetes or hypothyroidism. There are no known factors aggravating her hyperlipidemia. Pertinent negatives include no chest pain or shortness of breath. Current antihyperlipidemic treatment includes statins. The current treatment provides mild improvement of lipids. Compliance problems include adherence to exercise (unable due to back pain).  Risk factors for coronary artery disease include a sedentary lifestyle.   Back Pain  This is a chronic problem. The current episode started more than 1 year ago. The problem occurs constantly. The problem is unchanged. The pain is present in the lumbar spine. The quality of the pain is described as aching. The symptoms are aggravated by position. Stiffness is present all day. Associated symptoms include weakness. Pertinent negatives include no chest pain, fever, perianal numbness or tingling. Risk factors include sedentary lifestyle, lack of exercise and menopause. She has tried home exercises, heat, ice, muscle relaxant, NSAIDs and walking for the symptoms. The treatment provided mild relief.     Complaint of back pain. Followed by pain management. States unable to walk at home for exercise due to pain in back. Had several appointments rescheduled but will have second trial for ablation in 2 weeks and then scheduled ablation at end of June.  States has pain to right arm at night only but per patient and daughter she leans to the right at night in the recliner.     No issues with bowel or bladder.     States diet is overall healthy.    Followed by wound clinic for wound to left lower leg. Educated to elevate legs in the bed during the  "day to decrease swelling and promote wound healing.     Review of Systems   Constitutional: Negative for chills, fever and unexpected weight change.   Respiratory: Negative for shortness of breath.    Cardiovascular: Negative for chest pain and leg swelling.   Musculoskeletal: Positive for back pain.   Neurological: Positive for weakness. Negative for tingling.        Objective   Vital Signs:  /80 (BP Location: Left arm, Patient Position: Sitting, Cuff Size: Adult)   Pulse 92   Ht 162.6 cm (64\")   Wt 81.4 kg (179 lb 6.4 oz)   SpO2 92%   BMI 30.79 kg/m²     Physical Exam  Vitals reviewed.   Constitutional:       Appearance: Normal appearance. She is well-developed.   Neck:      Thyroid: No thyromegaly.   Cardiovascular:      Rate and Rhythm: Normal rate and regular rhythm.      Pulses: Normal pulses.      Heart sounds: Normal heart sounds.   Pulmonary:      Effort: Pulmonary effort is normal.      Breath sounds: Normal breath sounds.      Comments: E/U   Musculoskeletal:         General: Normal range of motion.      Cervical back: Normal range of motion and neck supple.   Lymphadenopathy:      Cervical: No cervical adenopathy.   Skin:     General: Skin is warm and dry.      Capillary Refill: Capillary refill takes 2 to 3 seconds.   Neurological:      Mental Status: She is alert and oriented to person, place, and time.   Psychiatric:         Behavior: Behavior normal. Behavior is cooperative.          The following data was reviewed by: Alvarez Ron III NP-C on 05/17/2023:  Common labs        8/22/2022    10:36 11/15/2022    09:25   Common Labs   Glucose 100   86     BUN 14   16     Creatinine 0.85   0.78     Sodium 140   141     Potassium 4.5   4.5     Chloride 103   105     Calcium 9.8   10.0     Total Protein  7.0     Albumin 4.20   4.40     Total Bilirubin 0.5   0.6     Alkaline Phosphatase 98   139     AST (SGOT) 20   19     ALT (SGPT) 14   14     WBC 6.41   6.60     Hemoglobin 12.7   " 12.7     Hematocrit 37.1   39.8     Platelets 376   404     Hemoglobin A1C 5.50                   Assessment and Plan     Problem List Items Addressed This Visit        Cardiac and Vasculature    Hyperlipidemia LDL goal <130 - Primary (Chronic)    Overview     Current medication: lipitor 10 mg daily          Current Assessment & Plan     Lipid abnormalities are unchanged.  Pharmacotherapy as ordered.  Lipids will be reassessed in 6 months.         Relevant Orders    Comprehensive Metabolic Panel    Lipid Panel With LDL / HDL Ratio    CBC (No Diff)    Stasis edema of both lower extremities (Chronic)    Current Assessment & Plan     Educated to lay in bed and elevate legs during the day. Sleeps in the recliner.     Followed by wound care         Relevant Orders    Comprehensive Metabolic Panel       Musculoskeletal and Injuries    Arthritis (Chronic)    Chronic left-sided low back pain with left-sided sciatica (Chronic)    Current Assessment & Plan     Followed by pain management.     Next trial for ablation in 2 week and appt for ablation is at end of June.         Other Visit Diagnoses     Medicare annual wellness visit, subsequent        Hypercalcemia                        Follow Up     Return in about 6 months (around 11/17/2023).    Patient was given instructions and counseling regarding her condition or for health maintenance advice. Please see specific information pulled into the AVS if appropriate.

## 2023-05-17 NOTE — ASSESSMENT & PLAN NOTE
Educated to lay in bed and elevate legs during the day. Sleeps in the recliner.     Followed by wound care

## 2023-05-17 NOTE — ASSESSMENT & PLAN NOTE
Followed by pain management.     Next trial for ablation in 2 week and appt for ablation is at end of June.

## 2023-05-18 LAB
ALBUMIN SERPL-MCNC: 4.3 G/DL (ref 3.5–5.2)
ALBUMIN/GLOB SERPL: 1.7 G/DL
ALP SERPL-CCNC: 122 U/L (ref 39–117)
ALT SERPL-CCNC: 15 U/L (ref 1–33)
AST SERPL-CCNC: 21 U/L (ref 1–32)
BILIRUB SERPL-MCNC: 0.5 MG/DL (ref 0–1.2)
BUN SERPL-MCNC: 16 MG/DL (ref 8–23)
BUN/CREAT SERPL: 22.2 (ref 7–25)
CALCIUM SERPL-MCNC: 10.2 MG/DL (ref 8.6–10.5)
CHLORIDE SERPL-SCNC: 105 MMOL/L (ref 98–107)
CHOLEST SERPL-MCNC: 171 MG/DL (ref 0–200)
CO2 SERPL-SCNC: 26.9 MMOL/L (ref 22–29)
CREAT SERPL-MCNC: 0.72 MG/DL (ref 0.57–1)
EGFRCR SERPLBLD CKD-EPI 2021: 81.5 ML/MIN/1.73
ERYTHROCYTE [DISTWIDTH] IN BLOOD BY AUTOMATED COUNT: 12.8 % (ref 12.3–15.4)
GLOBULIN SER CALC-MCNC: 2.5 GM/DL
GLUCOSE SERPL-MCNC: 88 MG/DL (ref 65–99)
HCT VFR BLD AUTO: 37.4 % (ref 34–46.6)
HDLC SERPL-MCNC: 63 MG/DL (ref 40–60)
HGB BLD-MCNC: 12.4 G/DL (ref 12–15.9)
LDLC SERPL CALC-MCNC: 92 MG/DL (ref 0–100)
LDLC/HDLC SERPL: 1.43 {RATIO}
MCH RBC QN AUTO: 29.8 PG (ref 26.6–33)
MCHC RBC AUTO-ENTMCNC: 33.2 G/DL (ref 31.5–35.7)
MCV RBC AUTO: 89.9 FL (ref 79–97)
PLATELET # BLD AUTO: 371 10*3/MM3 (ref 140–450)
POTASSIUM SERPL-SCNC: 3.9 MMOL/L (ref 3.5–5.2)
PROT SERPL-MCNC: 6.8 G/DL (ref 6–8.5)
RBC # BLD AUTO: 4.16 10*6/MM3 (ref 3.77–5.28)
SODIUM SERPL-SCNC: 141 MMOL/L (ref 136–145)
TRIGL SERPL-MCNC: 89 MG/DL (ref 0–150)
VLDLC SERPL CALC-MCNC: 16 MG/DL (ref 5–40)
WBC # BLD AUTO: 7.31 10*3/MM3 (ref 3.4–10.8)

## 2023-05-23 ENCOUNTER — TELEPHONE (OUTPATIENT)
Dept: PAIN MEDICINE | Facility: CLINIC | Age: 88
End: 2023-05-23
Payer: MEDICARE

## 2023-05-23 NOTE — TELEPHONE ENCOUNTER
Patients daughter asked if you have reviewed the note from wound care. She would like to know if you will continue with the procedure that is scheduled. Please advise.

## 2023-05-25 ENCOUNTER — HOSPITAL ENCOUNTER (OUTPATIENT)
Facility: SURGERY CENTER | Age: 88
Setting detail: HOSPITAL OUTPATIENT SURGERY
Discharge: HOME | End: 2023-05-25
Attending: ANESTHESIOLOGY | Admitting: ANESTHESIOLOGY
Payer: MEDICARE

## 2023-05-25 ENCOUNTER — HOSPITAL ENCOUNTER (OUTPATIENT)
Dept: GENERAL RADIOLOGY | Facility: SURGERY CENTER | Age: 88
Setting detail: HOSPITAL OUTPATIENT SURGERY
End: 2023-05-25
Payer: MEDICARE

## 2023-05-25 VITALS
TEMPERATURE: 97.8 F | HEIGHT: 64 IN | SYSTOLIC BLOOD PRESSURE: 145 MMHG | DIASTOLIC BLOOD PRESSURE: 75 MMHG | BODY MASS INDEX: 29.88 KG/M2 | OXYGEN SATURATION: 96 % | HEART RATE: 90 BPM | RESPIRATION RATE: 20 BRPM | WEIGHT: 175 LBS

## 2023-05-25 DIAGNOSIS — Z41.9 SURGERY, ELECTIVE: ICD-10-CM

## 2023-05-25 PROCEDURE — 64493 INJ PARAVERT F JNT L/S 1 LEV: CPT | Performed by: ANESTHESIOLOGY

## 2023-05-25 PROCEDURE — 64494 INJ PARAVERT F JNT L/S 2 LEV: CPT | Performed by: ANESTHESIOLOGY

## 2023-05-25 PROCEDURE — 25510000001 IOPAMIDOL 61 % SOLUTION 30 ML VIAL: Performed by: ANESTHESIOLOGY

## 2023-05-25 PROCEDURE — 77002 NEEDLE LOCALIZATION BY XRAY: CPT

## 2023-05-25 PROCEDURE — 76000 FLUOROSCOPY <1 HR PHYS/QHP: CPT

## 2023-05-25 NOTE — DISCHARGE INSTRUCTIONS
[facility] - Post-procedure Instructions          --  While there are no absolute restrictions, it is recommended that you do not perform strenuous activity today. In the morning, you may resume your level of activity as before your block.    --  If you have a band-aid at your injection site, please remove it later today. Observe the area for any redness, swelling, pus-like drainage, or a temperature over 101°. If any of these symptoms occur, please call your doctor at [phone]. If after office hours, leave a message and the on-call provider will return your call.    --  Ice may be applied to your injection site. It is recommended you avoid direct heat (heating pad; hot tub) for 1-2 days.    --  Call [facility] at [phone] if you experience persistent headache, persistent bleeding from the injection site, or severe pain not relieved by heat or oral medication.    --  Do not make important decisions today.    --  Due to the effects of the block and/or the I.V. Sedation, DO NOT drive or operate hazardous machinery for 12 hours.  Local anesthetics may cause numbness after procedure and precautions must be taken with regards to operating equipment as well as with walking, even if ambulating with assistance of another person or with an assistive device.    --  Do not drink alcohol for 12 hours.    -- You may return to work tomorrow, or as directed by your referring doctor.    --  Occasionally you may notice a slight increase in your pain after the procedure. This should start to improve within the next 24-48 hours. Radiofrequency ablation procedure pain may last 3-4 weeks.    --  It may take as long as 3-4 days before you notice a gradual improvement in your pain and/or other symptoms.    -- You may continue to take your prescribed pain medication as needed.    --  Some normal possible side effects of steroid use could include fluid retention, increased blood sugar, dull headache,  increased sweating, increased appetite, mood swings and flushing.    --  Diabetics are recommended to watch their blood glucose level closely for 24-48 hours after the injection.    --  Must stay in PACU for 20 min upon arrival and prove no leg weakness before being discharged.    --  IN THE EVENT OF A LIFE THREATENING EMERGENCY, (CHEST PAIN, BREATHING DIFFICULTIES, PARALYSIS…) YOU SHOULD GO TO YOUR NEAREST EMERGENCY ROOM.    --  You should be contacted by our office within 2-3 days to schedule follow up or next appointment date.  If not contacted within 7 days, please call the office at [phone]

## 2023-05-25 NOTE — OP NOTE
LEFT L3-5 Lumbar Medial Branch Blockade  [facility]    PREOPERATIVE DIAGNOSIS:  Lumbar spondylosis without myelopathy    POSTOPERATIVE DIAGNOSIS:  Lumbar spondylosis without myelopathy    PROCEDURE:   Diagnostic Left Lumbar Medial Branch Nerve Blockades, with fluoroscopy:  L3, L4, and L5 nerves (at the L4, L5 transverse processes and the sacral alar groove) to block facet joints L4-5, and L5-S1  1. 76298 -- Lumbar Facet block, 1st Level  2. 13218 -- Lumbar Facet block, 2nd  Level      PRE-PROCEDURE DISCUSSION WITH PATIENT:    Risks and complications were discussed with the patient prior to starting the procedure and informed consent was obtained.      SURGEON:   Jonathan Sharp MD    REASON FOR PROCEDURE:    The patient complains of pain that seems to have a significant axial component and Previous diagnostic positivity of a Lumbar Medial Branch Blockade at the same levels    SEDATION:  Patient declined administration of moderate sedation    ANESTHETIC:  Marcaine 0.5%  STEROID:  NONE  TOTAL VOLUME OF SOLUTION:  3 mL    DESCRIPTON OF PROCEDURE:  After obtaining informed consent, IV access was not obtained in the preoperative area.   The patient was taken to the operating room.  The patient was placed in the prone position with a pillow under the abdomen. All pressure points were well padded.  EKG, blood pressure, and pulse oximeter were monitored.  The patient was monitored and sedated by the RN under my direction. The lumbosacral area was prepped with Chloraprep and draped in a sterile fashion. Under fluoroscopic guidance the transverse processes of the L4 and L5 vertebrae at the junctions of the superior articular processes were identified on the affected side.  Also identified was the groove between the ala and the superior articular process of the sacrum on the ipsilateral side.  Skin and subcutaneous tissue were anesthetized with 1% lidocaine above each of these points. A spinal needle was  introduced under fluoroscopic guidance at the above junctions. Aspiration was negative for blood and CSF.  After confirming the position of the needle with fluoroscope in all views, 0.25 mL of Omnipaque was injected, and after seeing the proper spread a total of 1 mL of the anesthetic solution noted above was injected at each of these points.  Needles were removed intact from each of the areas.   Onset of analgesia was noted.  Vital signs remained stable throughout.      ESTIMATED BLOOD LOSS:  <5 mL  SPECIMENS:  none    COMPLICATIONS:   No complications were noted., There was no indication of vascular uptake on live injection of contrast dye., There was no indication of intrathecal uptake on live injection of contrast dye., There was not any evidence of dural puncture.   and The patient did not have any signs of postprocedure numbness nor weakness.    TOLERANCE & DISCHARGE CONDITION:    The patient tolerated the procedure well.  The patient was transported to the recovery area without difficulties.  The patient was discharged to home under the care of family in stable and satisfactory condition.    PLAN OF CARE:  1. The patient was given our standard instruction sheet.  2. We discussed that Lumbar Medial Branch Blockade is a diagnostic procedure in consideration for radiofrequency ablation if two diagnostic procedures prove to be positive for significant benefit.  If sustained relief of 6 to eight weeks is obtained, then an alternative plan could be therapeutic lumbar branch blockades.  3. The patient is asked to keep a pain log each hour for 8 hours after the procedure today.  4. The patient will  Return to clinic 1 wk  5. The patient will resume all medications as per the medication reconciliation sheet.

## 2023-05-30 DIAGNOSIS — G89.29 CHRONIC LEFT-SIDED LOW BACK PAIN WITH LEFT-SIDED SCIATICA: ICD-10-CM

## 2023-05-30 DIAGNOSIS — M54.42 CHRONIC LEFT-SIDED LOW BACK PAIN WITH LEFT-SIDED SCIATICA: ICD-10-CM

## 2023-05-30 RX ORDER — ETODOLAC 200 MG/1
CAPSULE ORAL
Qty: 90 CAPSULE | Refills: 0 | Status: SHIPPED | OUTPATIENT
Start: 2023-05-30

## 2023-05-30 RX ORDER — CYCLOBENZAPRINE HCL 10 MG
TABLET ORAL
Qty: 90 TABLET | Refills: 0 | Status: SHIPPED | OUTPATIENT
Start: 2023-05-30

## 2023-06-01 ENCOUNTER — OFFICE VISIT (OUTPATIENT)
Dept: WOUND CARE | Facility: HOSPITAL | Age: 88
End: 2023-06-01
Payer: MEDICARE

## 2023-06-01 ENCOUNTER — OFFICE VISIT (OUTPATIENT)
Dept: PAIN MEDICINE | Facility: CLINIC | Age: 88
End: 2023-06-01

## 2023-06-01 ENCOUNTER — PREP FOR SURGERY (OUTPATIENT)
Dept: SURGERY | Facility: SURGERY CENTER | Age: 88
End: 2023-06-01

## 2023-06-01 VITALS
BODY MASS INDEX: 30.15 KG/M2 | DIASTOLIC BLOOD PRESSURE: 79 MMHG | HEART RATE: 91 BPM | TEMPERATURE: 97.3 F | OXYGEN SATURATION: 97 % | SYSTOLIC BLOOD PRESSURE: 129 MMHG | HEIGHT: 64 IN | WEIGHT: 176.6 LBS | RESPIRATION RATE: 18 BRPM

## 2023-06-01 DIAGNOSIS — M47.816 LUMBAR FACET ARTHROPATHY: Primary | ICD-10-CM

## 2023-06-01 DIAGNOSIS — Z79.899 ENCOUNTER FOR LONG-TERM (CURRENT) DRUG USE: ICD-10-CM

## 2023-06-01 DIAGNOSIS — G89.4 CHRONIC PAIN SYNDROME: ICD-10-CM

## 2023-06-01 DIAGNOSIS — M54.16 LEFT LUMBAR RADICULOPATHY: ICD-10-CM

## 2023-06-01 DIAGNOSIS — M48.061 NEURAL FORAMINAL STENOSIS OF LUMBAR SPINE: ICD-10-CM

## 2023-06-01 PROCEDURE — G0463 HOSPITAL OUTPT CLINIC VISIT: HCPCS

## 2023-06-01 RX ORDER — LIDOCAINE 50 MG/G
1 PATCH TOPICAL EVERY 24 HOURS
Qty: 30 PATCH | Refills: 2 | Status: SHIPPED | OUTPATIENT
Start: 2023-06-01

## 2023-06-01 RX ORDER — SODIUM CHLORIDE 0.9 % (FLUSH) 0.9 %
10 SYRINGE (ML) INJECTION AS NEEDED
OUTPATIENT
Start: 2023-06-01

## 2023-06-01 RX ORDER — SODIUM CHLORIDE 0.9 % (FLUSH) 0.9 %
10 SYRINGE (ML) INJECTION EVERY 12 HOURS SCHEDULED
OUTPATIENT
Start: 2023-06-01

## 2023-06-01 RX ORDER — HYDROCODONE BITARTRATE AND ACETAMINOPHEN 5; 325 MG/1; MG/1
1 TABLET ORAL 2 TIMES DAILY PRN
Qty: 60 TABLET | Refills: 0 | Status: SHIPPED | OUTPATIENT
Start: 2023-06-01

## 2023-06-01 NOTE — H&P (VIEW-ONLY)
CHIEF COMPLAINT  Back pain    Subjective   Shanique Martinez is a 87 y.o. female  who presents to the office for follow-up of procedure.  She completed a Left L3-L5 MBB LEFT on 5/25/2023 performed by Dr. Sharp for management of back pain. Patient reports 80% relief from the procedure for 2 days. She was able to change positions and walk further without experiencing increased pain.     Today pain is 9/10VAS in severity. Pain is located in her low back, mostly on the left side. Pain radiates down left lateral/posterior thigh. Describes this pain as an intermittent ache. She reports that the radiating pain has been less frequent since her epidural. Pain is worsened by increased physical activity, household chores  that require twisting or prolonged standing, and walking long distances. Pain improves with rest/reposition, use of a TENS unit, and medications. Continues with Etodolac 200mg TID, Tylenol Arthritis PRN and Flexeril 10mg 2-3/day.      She continues with Hydrocodone 5mg 1-2/day.This medication allows her to be more active around her house and rest better at night. Denies any side effects from the regimen, including constipation and somnolence. Denies any bowel or bladder changes.     Patient saw Dr. Ludwig St on 8/2/2022.  In review of his note he noted that he would recommend that patient try to avoid surgery if at all possible.  He suggested epidural blocks and physical therapy.       She gets bilateral knee gel injections with Dr. Clark every 6 months. She had injections last week and while there was given a referral for PT.    Procedures:  5/25/23 - Left L3-L5 MBB - 80% relief x 2 days  3/23/23 - Left L3-L5 MBB -  80% relief x 4 days  1/5/23 - L4/L5 LESI - 80% ongoing relief  10/27/22 - Left L4 TF LESI - 95% relief x 2 weeks    Back Pain  This is a chronic problem. The problem occurs intermittently. The problem has been waxing and waning since onset. The pain is present in the lumbar spine (L side  > R side). The quality of the pain is described as aching. The pain radiates to the left thigh (left lateral/posterior thigh). The pain is at a severity of 7/10. The symptoms are aggravated by standing and position (increased physical activity, prolonged standing, walking long distances). Associated symptoms include weakness (bilateral legs). Pertinent negatives include no abdominal pain, chest pain, dysuria, fever, headaches or numbness. Risk factors include sedentary lifestyle. She has tried muscle relaxant and NSAIDs (rest/reposition, PT in the past, TENS unit, Tylenol, Lodine, Flexeril) for the symptoms.      PEG Assessment   What number best describes your pain on average in the past week?7  What number best describes how, during the past week, pain has interfered with your enjoyment of life?8  What number best describes how, during the past week, pain has interfered with your general activity?  8    Review of Pertinent Medical Data ---  RADIOLOGY REPORT     FACILITY:  [facility]   UNIT/AGE/GENDER: ND.MRI  OP      AGE:86 Y          SEX:F   PATIENT NAME/:  CATHLEEN GALE S    1935   UNIT NUMBER:  [id number]   ACCOUNT NUMBER:  [id number]   ACCESSION NUMBER:  [id number]     MRI lumbar spine without contrast,     2022 at 0837 hours     HISTORY: Low back pain.         TECHNIQUE: Multiplanar multisequence imaging was obtained of the lumbar spine on a 3 Ruby magnet. Images were obtained without IV gadolinium.     COMPARISON: No     FINDINGS: The conus terminates at L1. The signal within the conus is within normal limits. The cauda equina is unremarkable.     There is moderate dextroscoliosis with the apex at L3. There is left lateral translation of L2 on L3. There is 3 mm anterolisthesis of L4 and L5 secondary to facet disease. There is mild-to-moderate multilevel degenerative disc disease. No fracture or   pars defect is demonstrated. The paravertebral soft tissues are  unremarkable.     At T11-T12 broad-based disc bulge and facet disease resulting in moderate right neuroforaminal narrowing     T12-L1: A broad-based disc bulge and facet disease resulting in mild-to-moderate right neuroforaminal narrowing     L1-L2: A broad-based disc bulge, posterior osseous lipping and facet disease resulting in moderate left neuroforaminal narrowing.     L2-L3: A broad-based disc bulge, posterior osteophytes with facet disease resulting in moderate left neuroforaminal narrowing.     L3-L4: A broad-based disc bulge and facet disease resulting in moderate left neuroforaminal narrowing.     L4-L5: There is 3 mm anterolisthesis of L4 and L5 secondary to severe facet disease. There is uncovering of the disc. There is severe left-sided neuroforaminal narrowing and mild spinal stenosis.     L5-S1: A broad-based disc bulge and facet disease result in mild bilateral neuroforaminal narrowing     IMPRESSION:     1. Moderate dextroscoliosis with the apex at L3. There is left lateral translation of L2 on L3.   2. There is 3 mm anterolisthesis of L4 and L5 secondary to facet disease. Uncovering of the disc is resulting in severe left neuroforaminal narrowing and mild spinal stenosis.   3. Mild-to-moderate degenerative spondylosis.   4. At L1-L2 and L2-L3 degenerative spondylosis resulting in moderate left neuroforaminal narrowing     Dictated by: Patricia Mcgee M.D.     Images and Report reviewed and interpreted by: Patricia Mcgee M.D.     <PS><Electronically signed by: Patricia Mcgee M.D.>   04/23/2022 1230        The following portions of the patient's history were reviewed and updated as appropriate: allergies, current medications, past family history, past medical history, past social history, past surgical history and problem list.    Review of Systems   Constitutional: Negative for fever.   Cardiovascular: Negative for chest pain.   Gastrointestinal: Negative for abdominal pain, constipation and  "diarrhea.   Genitourinary: Negative for difficulty urinating and dysuria.   Musculoskeletal: Positive for arthralgias (bilateral knees) and back pain.   Neurological: Positive for weakness (bilateral legs). Negative for numbness and headaches.   Psychiatric/Behavioral: Negative for sleep disturbance and suicidal ideas. The patient is not nervous/anxious.      I have reviewed and confirmed the accuracy of the ROS as documented by the MA/LPN/RN LALITHA Best     Vitals:    06/01/23 1410   BP: 129/79   Pulse: 91   Resp: 18   Temp: 97.3 °F (36.3 °C)   SpO2: 97%   Weight: 80.1 kg (176 lb 9.6 oz)   Height: 162.6 cm (64\")   PainSc:   9   PainLoc: Back     Objective   Physical Exam  Constitutional:       Appearance: Normal appearance.   HENT:      Head: Normocephalic.   Cardiovascular:      Rate and Rhythm: Normal rate and regular rhythm.   Pulmonary:      Effort: Pulmonary effort is normal.      Breath sounds: Normal breath sounds.   Musculoskeletal:      Cervical back: Tenderness (left side) present. Pain with movement and muscular tenderness present. Decreased range of motion.      Lumbar back: No tenderness or bony tenderness. Positive left straight leg raise test. Negative right straight leg raise test.      Comments: + lumbar facet loading/tenderness   Skin:     General: Skin is warm and dry.      Capillary Refill: Capillary refill takes less than 2 seconds.   Neurological:      General: No focal deficit present.      Mental Status: She is alert and oriented to person, place, and time.      Gait: Gait abnormal (slowed, ambulates with walker).   Psychiatric:         Mood and Affect: Mood normal.         Behavior: Behavior normal.         Thought Content: Thought content normal.         Cognition and Memory: Cognition normal.       Assessment & Plan   Diagnoses and all orders for this visit:    1. Lumbar facet arthropathy (Primary)  -     HYDROcodone-acetaminophen (NORCO) 5-325 MG per tablet; Take 1 tablet by " "mouth 2 (Two) Times a Day As Needed (Pain). 30 day supply  Dispense: 60 tablet; Refill: 0  -     lidocaine (LIDODERM) 5 %; Place 1 patch on the skin as directed by provider Daily. Remove & Discard patch within 12 hours or as directed by MD  Dispense: 30 patch; Refill: 2    2. Left lumbar radiculopathy    3. Chronic pain syndrome    4. Neural foraminal stenosis of lumbar spine    5. Encounter for long-term (current) drug use    --- Proceed with Left L3-L5 RFA - scheduled for 6/15/23 with Dr. Sharp  -------  Education about Medial Branch Blockade and RF Therapy:  This medial branch blockade (MBB) suggested is intended for diagnostic purposes, with the intent of offering the patient Radiofrequency thermal rhizotomy (RF) if the MBB is diagnostically effective.  The diagnostic blockade is necessary to determine the likelihood that RF therapy could be efficacious in providing long term relief to the patient.    Medial branches are sensory nerve branches that connect to a facet joint and transmit sensations & pain signals from that joint.  Facet is a term for the type of joints found in the spine.  Medial branches are the nerves that go to a facet, and therefore are also sometimes called \"facet joint nerves\" (FJNs).      In a medial branch blockade procedure, xray fluoroscopy is used to verify the locations of the outside of the joint lines which are being targeted.  Under xray guidance, needles are placed to these areas.  Contrast dye is injected to confirm proper placement, with dye flowing over the joint area, and to ensure that the dye does not flow into unintended areas such as a vein.  When this is confirmed, local anesthetic is injected to block the medial branch at that joint level.      If MBBs are diagnostically successful in blocking pain, then the patient is most likely a great candidate for Radiofrequency of those facet joint nerves.  In the RF procedure, needles are placed to the joint lines in the same " fashion, and after testing, the needle tips are heated to thermally treat the nerves, blocking the nerves by in essence damaging the nerves with the heat treatment(non-pulsed).       Medically, a successful RF procedure should provide a patient with 50% pain relief or more for at least 6 months.  Clinical experience suggests that successful patients receive relief more in the range of 12 months on average.  We also discussed that a fortunate minority of patients receive therapeutic success from the MBB, and may not require RF ablation.  If a patient receives more than 8 weeks of relief from MBB, then occasional repeat MBB for therapeutic purposes is a very reasonable alternative therapy.  This course of therapy is consistent with our LCDs according to our CMS  in the area, and therefore other insurance providers should follow accordingly.  We will monitor our patients to screen for these therapeutic responders and will offer RF therapy only when necessary.      We discussed that MBB & RF are not without risks.  Guidelines regarding anticoagulant use & neuraxial procedures will be respected.  Patients that are ill or otherwise may be at risk for sepsis will not have their spines accessed by neuraxial injections of any type.  This patient will not be offered these therapies if there is an increased risk.   We discussed that there is a risk of postprocedural pain and also a risk of worsening of clinical picture with these procedures as with any neuraxial procedure.    -------  Discussed with the patient that sedation is optional for this procedure.  The sedation offered is called conscious sedation which is different from general anesthesia that is utilized in surgical procedures. The dosing of the sedation is determined by the physician and they will be monitored throughout the procedure. With conscious sedation it is possible to remember parts or all of the procedure, this is normal. They will need to  have a  with them as driving is prohibited following conscious sedation.      NPO instructions for conscious sedation:  --- Do not eat 6 hours prior to the procedure.   --- Do not drink any dairy or citrus 4 hours prior to the procedure.   --- Do not drink anything, including clear liquids, 2 hours prior to procedure.      If the NPO instructions are not followed then the procedure may be performed without sedation or the procedure will need to be rescheduled.     --- The urine drug screen confirmation from 5/2/23 has been reviewed and the result is appropriate based on patient history and ELE report  --- The patient signed an updated copy of the controlled substance agreement on 5/2/23  --- Opioid risk assessment completed - low risk for misuse/abuse  --- Continue Hydrocodone 5mg. Increased to #60 at this time due to increased pain. Patient appears stable with current regimen. No adverse effects. Regarding continuation of opioids, there is no evidence of aberrant behavior or any red flags.  The patient continues with appropriate response to opioid therapy. ADL's remain intact by self.   --- Prescription Lidocaine 5% patches to pharmacy  --- Follow-up for procedure - scheduled for 7/27/23      Thermal Radiofrequency Destruction  This initial thermal radiofrequency destruction (RFA) of cervical, thoracic, or lumbar paravertebral facet joint (medial branch) nerves is considered medically reasonable and necessary as this patient has met the criteria of having at least two (2) medically reasonable and necessary diagnostic MBBs, with each one providing a consistent minimum of 80% sustained relief of primary (index) pain (with the duration of relief being consistent with the agent used).    ELE REPORT  As part of the patient's treatment plan, I am prescribing controlled substances. The patient has been made aware of appropriate use of such medications, including potential risk of somnolence, limited ability to  drive and/or work safely, and the potential for dependence or overdose. It has also been made clear that these medications are for use by this patient only, without concomitant use of alcohol or other substances unless prescribed.     Patient has completed prescribing agreement detailing terms of continued prescribing of controlled substances, including monitoring ELE reports, urine drug screening, and pill counts if necessary. The patient is aware that inappropriate use will results in cessation of prescribing such medications.    As the clinician, I personally reviewed the ELE from 6/1/23 while the patient was in the office today.    History and physical exam exhibit continued safe and appropriate use of controlled substances.    Dictated utilizing Dragon dictation.

## 2023-06-01 NOTE — PROGRESS NOTES
CHIEF COMPLAINT  Back pain    Subjective   Shanique Martinez is a 87 y.o. female  who presents to the office for follow-up of procedure.  She completed a Left L3-L5 MBB LEFT on 5/25/2023 performed by Dr. Sharp for management of back pain. Patient reports 80% relief from the procedure for 2 days. She was able to change positions and walk further without experiencing increased pain.     Today pain is 9/10VAS in severity. Pain is located in her low back, mostly on the left side. Pain radiates down left lateral/posterior thigh. Describes this pain as an intermittent ache. She reports that the radiating pain has been less frequent since her epidural. Pain is worsened by increased physical activity, household chores  that require twisting or prolonged standing, and walking long distances. Pain improves with rest/reposition, use of a TENS unit, and medications. Continues with Etodolac 200mg TID, Tylenol Arthritis PRN and Flexeril 10mg 2-3/day.      She continues with Hydrocodone 5mg 1-2/day.This medication allows her to be more active around her house and rest better at night. Denies any side effects from the regimen, including constipation and somnolence. Denies any bowel or bladder changes.     Patient saw Dr. Ludwig St on 8/2/2022.  In review of his note he noted that he would recommend that patient try to avoid surgery if at all possible.  He suggested epidural blocks and physical therapy.       She gets bilateral knee gel injections with Dr. Clark every 6 months. She had injections last week and while there was given a referral for PT.    Procedures:  5/25/23 - Left L3-L5 MBB - 80% relief x 2 days  3/23/23 - Left L3-L5 MBB -  80% relief x 4 days  1/5/23 - L4/L5 LESI - 80% ongoing relief  10/27/22 - Left L4 TF LESI - 95% relief x 2 weeks    Back Pain  This is a chronic problem. The problem occurs intermittently. The problem has been waxing and waning since onset. The pain is present in the lumbar spine (L side  > R side). The quality of the pain is described as aching. The pain radiates to the left thigh (left lateral/posterior thigh). The pain is at a severity of 7/10. The symptoms are aggravated by standing and position (increased physical activity, prolonged standing, walking long distances). Associated symptoms include weakness (bilateral legs). Pertinent negatives include no abdominal pain, chest pain, dysuria, fever, headaches or numbness. Risk factors include sedentary lifestyle. She has tried muscle relaxant and NSAIDs (rest/reposition, PT in the past, TENS unit, Tylenol, Lodine, Flexeril) for the symptoms.      PEG Assessment   What number best describes your pain on average in the past week?7  What number best describes how, during the past week, pain has interfered with your enjoyment of life?8  What number best describes how, during the past week, pain has interfered with your general activity?  8    Review of Pertinent Medical Data ---  RADIOLOGY REPORT     FACILITY:  [facility]   UNIT/AGE/GENDER: ND.MRI  OP      AGE:86 Y          SEX:F   PATIENT NAME/:  CATHLEEN GALE S    1935   UNIT NUMBER:  [id number]   ACCOUNT NUMBER:  [id number]   ACCESSION NUMBER:  [id number]     MRI lumbar spine without contrast,     2022 at 0837 hours     HISTORY: Low back pain.         TECHNIQUE: Multiplanar multisequence imaging was obtained of the lumbar spine on a 3 Ruby magnet. Images were obtained without IV gadolinium.     COMPARISON: No     FINDINGS: The conus terminates at L1. The signal within the conus is within normal limits. The cauda equina is unremarkable.     There is moderate dextroscoliosis with the apex at L3. There is left lateral translation of L2 on L3. There is 3 mm anterolisthesis of L4 and L5 secondary to facet disease. There is mild-to-moderate multilevel degenerative disc disease. No fracture or   pars defect is demonstrated. The paravertebral soft tissues are  unremarkable.     At T11-T12 broad-based disc bulge and facet disease resulting in moderate right neuroforaminal narrowing     T12-L1: A broad-based disc bulge and facet disease resulting in mild-to-moderate right neuroforaminal narrowing     L1-L2: A broad-based disc bulge, posterior osseous lipping and facet disease resulting in moderate left neuroforaminal narrowing.     L2-L3: A broad-based disc bulge, posterior osteophytes with facet disease resulting in moderate left neuroforaminal narrowing.     L3-L4: A broad-based disc bulge and facet disease resulting in moderate left neuroforaminal narrowing.     L4-L5: There is 3 mm anterolisthesis of L4 and L5 secondary to severe facet disease. There is uncovering of the disc. There is severe left-sided neuroforaminal narrowing and mild spinal stenosis.     L5-S1: A broad-based disc bulge and facet disease result in mild bilateral neuroforaminal narrowing     IMPRESSION:     1. Moderate dextroscoliosis with the apex at L3. There is left lateral translation of L2 on L3.   2. There is 3 mm anterolisthesis of L4 and L5 secondary to facet disease. Uncovering of the disc is resulting in severe left neuroforaminal narrowing and mild spinal stenosis.   3. Mild-to-moderate degenerative spondylosis.   4. At L1-L2 and L2-L3 degenerative spondylosis resulting in moderate left neuroforaminal narrowing     Dictated by: Patricia Mcgee M.D.     Images and Report reviewed and interpreted by: Patricia Mcgee M.D.     <PS><Electronically signed by: Patricia Mcgee M.D.>   04/23/2022 1230        The following portions of the patient's history were reviewed and updated as appropriate: allergies, current medications, past family history, past medical history, past social history, past surgical history and problem list.    Review of Systems   Constitutional: Negative for fever.   Cardiovascular: Negative for chest pain.   Gastrointestinal: Negative for abdominal pain, constipation and  "diarrhea.   Genitourinary: Negative for difficulty urinating and dysuria.   Musculoskeletal: Positive for arthralgias (bilateral knees) and back pain.   Neurological: Positive for weakness (bilateral legs). Negative for numbness and headaches.   Psychiatric/Behavioral: Negative for sleep disturbance and suicidal ideas. The patient is not nervous/anxious.      I have reviewed and confirmed the accuracy of the ROS as documented by the MA/LPN/RN LALITHA Best     Vitals:    06/01/23 1410   BP: 129/79   Pulse: 91   Resp: 18   Temp: 97.3 °F (36.3 °C)   SpO2: 97%   Weight: 80.1 kg (176 lb 9.6 oz)   Height: 162.6 cm (64\")   PainSc:   9   PainLoc: Back     Objective   Physical Exam  Constitutional:       Appearance: Normal appearance.   HENT:      Head: Normocephalic.   Cardiovascular:      Rate and Rhythm: Normal rate and regular rhythm.   Pulmonary:      Effort: Pulmonary effort is normal.      Breath sounds: Normal breath sounds.   Musculoskeletal:      Cervical back: Tenderness (left side) present. Pain with movement and muscular tenderness present. Decreased range of motion.      Lumbar back: No tenderness or bony tenderness. Positive left straight leg raise test. Negative right straight leg raise test.      Comments: + lumbar facet loading/tenderness   Skin:     General: Skin is warm and dry.      Capillary Refill: Capillary refill takes less than 2 seconds.   Neurological:      General: No focal deficit present.      Mental Status: She is alert and oriented to person, place, and time.      Gait: Gait abnormal (slowed, ambulates with walker).   Psychiatric:         Mood and Affect: Mood normal.         Behavior: Behavior normal.         Thought Content: Thought content normal.         Cognition and Memory: Cognition normal.       Assessment & Plan   Diagnoses and all orders for this visit:    1. Lumbar facet arthropathy (Primary)  -     HYDROcodone-acetaminophen (NORCO) 5-325 MG per tablet; Take 1 tablet by " "mouth 2 (Two) Times a Day As Needed (Pain). 30 day supply  Dispense: 60 tablet; Refill: 0  -     lidocaine (LIDODERM) 5 %; Place 1 patch on the skin as directed by provider Daily. Remove & Discard patch within 12 hours or as directed by MD  Dispense: 30 patch; Refill: 2    2. Left lumbar radiculopathy    3. Chronic pain syndrome    4. Neural foraminal stenosis of lumbar spine    5. Encounter for long-term (current) drug use    --- Proceed with Left L3-L5 RFA - scheduled for 6/15/23 with Dr. Sharp  -------  Education about Medial Branch Blockade and RF Therapy:  This medial branch blockade (MBB) suggested is intended for diagnostic purposes, with the intent of offering the patient Radiofrequency thermal rhizotomy (RF) if the MBB is diagnostically effective.  The diagnostic blockade is necessary to determine the likelihood that RF therapy could be efficacious in providing long term relief to the patient.    Medial branches are sensory nerve branches that connect to a facet joint and transmit sensations & pain signals from that joint.  Facet is a term for the type of joints found in the spine.  Medial branches are the nerves that go to a facet, and therefore are also sometimes called \"facet joint nerves\" (FJNs).      In a medial branch blockade procedure, xray fluoroscopy is used to verify the locations of the outside of the joint lines which are being targeted.  Under xray guidance, needles are placed to these areas.  Contrast dye is injected to confirm proper placement, with dye flowing over the joint area, and to ensure that the dye does not flow into unintended areas such as a vein.  When this is confirmed, local anesthetic is injected to block the medial branch at that joint level.      If MBBs are diagnostically successful in blocking pain, then the patient is most likely a great candidate for Radiofrequency of those facet joint nerves.  In the RF procedure, needles are placed to the joint lines in the same " fashion, and after testing, the needle tips are heated to thermally treat the nerves, blocking the nerves by in essence damaging the nerves with the heat treatment(non-pulsed).       Medically, a successful RF procedure should provide a patient with 50% pain relief or more for at least 6 months.  Clinical experience suggests that successful patients receive relief more in the range of 12 months on average.  We also discussed that a fortunate minority of patients receive therapeutic success from the MBB, and may not require RF ablation.  If a patient receives more than 8 weeks of relief from MBB, then occasional repeat MBB for therapeutic purposes is a very reasonable alternative therapy.  This course of therapy is consistent with our LCDs according to our CMS  in the area, and therefore other insurance providers should follow accordingly.  We will monitor our patients to screen for these therapeutic responders and will offer RF therapy only when necessary.      We discussed that MBB & RF are not without risks.  Guidelines regarding anticoagulant use & neuraxial procedures will be respected.  Patients that are ill or otherwise may be at risk for sepsis will not have their spines accessed by neuraxial injections of any type.  This patient will not be offered these therapies if there is an increased risk.   We discussed that there is a risk of postprocedural pain and also a risk of worsening of clinical picture with these procedures as with any neuraxial procedure.    -------  Discussed with the patient that sedation is optional for this procedure.  The sedation offered is called conscious sedation which is different from general anesthesia that is utilized in surgical procedures. The dosing of the sedation is determined by the physician and they will be monitored throughout the procedure. With conscious sedation it is possible to remember parts or all of the procedure, this is normal. They will need to  have a  with them as driving is prohibited following conscious sedation.      NPO instructions for conscious sedation:  --- Do not eat 6 hours prior to the procedure.   --- Do not drink any dairy or citrus 4 hours prior to the procedure.   --- Do not drink anything, including clear liquids, 2 hours prior to procedure.      If the NPO instructions are not followed then the procedure may be performed without sedation or the procedure will need to be rescheduled.     --- The urine drug screen confirmation from 5/2/23 has been reviewed and the result is appropriate based on patient history and ELE report  --- The patient signed an updated copy of the controlled substance agreement on 5/2/23  --- Opioid risk assessment completed - low risk for misuse/abuse  --- Continue Hydrocodone 5mg. Increased to #60 at this time due to increased pain. Patient appears stable with current regimen. No adverse effects. Regarding continuation of opioids, there is no evidence of aberrant behavior or any red flags.  The patient continues with appropriate response to opioid therapy. ADL's remain intact by self.   --- Prescription Lidocaine 5% patches to pharmacy  --- Follow-up for procedure - scheduled for 7/27/23      Thermal Radiofrequency Destruction  This initial thermal radiofrequency destruction (RFA) of cervical, thoracic, or lumbar paravertebral facet joint (medial branch) nerves is considered medically reasonable and necessary as this patient has met the criteria of having at least two (2) medically reasonable and necessary diagnostic MBBs, with each one providing a consistent minimum of 80% sustained relief of primary (index) pain (with the duration of relief being consistent with the agent used).    ELE REPORT  As part of the patient's treatment plan, I am prescribing controlled substances. The patient has been made aware of appropriate use of such medications, including potential risk of somnolence, limited ability to  drive and/or work safely, and the potential for dependence or overdose. It has also been made clear that these medications are for use by this patient only, without concomitant use of alcohol or other substances unless prescribed.     Patient has completed prescribing agreement detailing terms of continued prescribing of controlled substances, including monitoring ELE reports, urine drug screening, and pill counts if necessary. The patient is aware that inappropriate use will results in cessation of prescribing such medications.    As the clinician, I personally reviewed the ELE from 6/1/23 while the patient was in the office today.    History and physical exam exhibit continued safe and appropriate use of controlled substances.    Dictated utilizing Dragon dictation.

## 2023-06-15 ENCOUNTER — HOSPITAL ENCOUNTER (OUTPATIENT)
Dept: GENERAL RADIOLOGY | Facility: SURGERY CENTER | Age: 88
Setting detail: HOSPITAL OUTPATIENT SURGERY
End: 2023-06-15
Payer: MEDICARE

## 2023-06-15 ENCOUNTER — HOSPITAL ENCOUNTER (OUTPATIENT)
Facility: SURGERY CENTER | Age: 88
Setting detail: HOSPITAL OUTPATIENT SURGERY
Discharge: HOME | End: 2023-06-15
Attending: ANESTHESIOLOGY | Admitting: ANESTHESIOLOGY
Payer: MEDICARE

## 2023-06-15 VITALS
WEIGHT: 180 LBS | RESPIRATION RATE: 20 BRPM | OXYGEN SATURATION: 97 % | HEIGHT: 64 IN | TEMPERATURE: 98.4 F | DIASTOLIC BLOOD PRESSURE: 68 MMHG | SYSTOLIC BLOOD PRESSURE: 149 MMHG | HEART RATE: 97 BPM | BODY MASS INDEX: 30.73 KG/M2

## 2023-06-15 DIAGNOSIS — Z41.9 SURGERY, ELECTIVE: ICD-10-CM

## 2023-06-15 DIAGNOSIS — M47.816 LUMBAR FACET ARTHROPATHY: ICD-10-CM

## 2023-06-15 PROCEDURE — 76000 FLUOROSCOPY <1 HR PHYS/QHP: CPT

## 2023-06-15 PROCEDURE — 25010000002 FENTANYL CITRATE (PF) 50 MCG/ML SOLUTION: Performed by: ANESTHESIOLOGY

## 2023-06-15 PROCEDURE — 64636 DESTROY L/S FACET JNT ADDL: CPT | Performed by: ANESTHESIOLOGY

## 2023-06-15 PROCEDURE — 64635 DESTROY LUMB/SAC FACET JNT: CPT | Performed by: ANESTHESIOLOGY

## 2023-06-15 PROCEDURE — 25010000002 MIDAZOLAM PER 1 MG: Performed by: ANESTHESIOLOGY

## 2023-06-15 RX ORDER — FENTANYL CITRATE 50 UG/ML
INJECTION, SOLUTION INTRAMUSCULAR; INTRAVENOUS AS NEEDED
Status: DISCONTINUED | OUTPATIENT
Start: 2023-06-15 | End: 2023-06-15 | Stop reason: HOSPADM

## 2023-06-15 RX ORDER — SODIUM CHLORIDE 0.9 % (FLUSH) 0.9 %
10 SYRINGE (ML) INJECTION AS NEEDED
Status: DISCONTINUED | OUTPATIENT
Start: 2023-06-15 | End: 2023-06-15 | Stop reason: HOSPADM

## 2023-06-15 RX ORDER — MIDAZOLAM HYDROCHLORIDE 1 MG/ML
INJECTION INTRAMUSCULAR; INTRAVENOUS AS NEEDED
Status: DISCONTINUED | OUTPATIENT
Start: 2023-06-15 | End: 2023-06-15 | Stop reason: HOSPADM

## 2023-06-15 RX ORDER — SODIUM CHLORIDE 0.9 % (FLUSH) 0.9 %
10 SYRINGE (ML) INJECTION EVERY 12 HOURS SCHEDULED
Status: DISCONTINUED | OUTPATIENT
Start: 2023-06-15 | End: 2023-06-15 | Stop reason: HOSPADM

## 2023-06-15 NOTE — OP NOTE
RFA UNILATERAL - L3-5  - Left  [facility]    PREOPERATIVE DIAGNOSIS:  Lumbar spondylosis without myelopathy   POSTOPERATIVE DIAGNOSIS:  Lumbar spondylosis without myelopathy     PROCEDURES PERFORMED:   Left  lumbar radiofrequency ablation of the medial branches L3, L4 and L5 at the transverse processes of L4 & L5, and the sacral alar groove to thermally treat these 2 facet joints.  1.  80069 - Lumbar radiofrequency ablation 1st level.  2.  46399 - Lumbar radiofrequency ablation 2nd level.     INFORMED CONSENT:  In preprocedure discussion with the patient, the risks and complications were discussed prior to starting the procedure and informed consent was obtained.     SURGEON:  Jonathan Sharp MD    INDICATIONS:  The patient presents with chronic lower back pain. The patient underwent 2 Left-sided lumbar medial branch blocks with diagnostically positive relief. Given the patient’s significant pain relief, it is diagnostic that we have likely found the source of the patient’s pain; therefore, lumbar radiofrequency ablation has been indicated.     SEDATION:  Versed 2mg & Fentanyl 50 mcg IV.    TIME OF PROCEDURE:  The Interoperative procedure time, after administration of the IV sedative, was 19 minutes.    ANESTHETIC:  Lidocaine 1% for skin infiltration, 2% lidocaine for injection.    STEROID:  None.    DESCRIPTION OF PROCEDURE:  After obtaining informed consent an IV was  started in the preoperative area. The patient was taken to the operating room. The patient was placed in prone position with a pillow under the abdomen. All pressure points were padded. EKG, blood pressure, and pulse oximetry were monitored. The patient was not  sedated. She was comfortable.  The lumbosacral area was prepped with ChloraPrep and draped in a sterile fashion.     Under fluoroscopic guidance in an oblique dimension aiming medially to above mentioned side, the transverse processes of the L4 and L5 vertebrae were  identified at their junctions with the superior articular processes.  Also identified was the junction of the sacral alar groove with the superior articular process of the sacrum in the same fluoroscopic dimension. The skin overlying these starting points was anesthetized with 1 mL of 1% lidocaine and then the radiofrequency probe needles were advanced down in the oblique view down to these junctions. With the needle tip positioned they were confirmed by AP and oblique views and, after checking for negative aspiration, sensory testing was performed down to 0.49 V at 50 Hz where there was a loss of painful stimulation. There was a lack of radicular stimulation up to 3 V and 2 Hz, therefore giving the appropriate sensory testing and negative motor testing.  Then 1 mL of 2% lidocaine was instilled in each needle.  Two minutes was allowed to elapse, and during this time a lateral fluoroscopic view was taken to confirm that the needles had not advanced nor retracted.  Then radiofrequency lesioning was performed at 80° for 3 minutes. The needles were removed intact.     ESTIMATED BLOOD LOSS:  Minimal.    SPECIMENS:  None.    COMPLICATIONS:  None.    TOLERANCE AND DISCHARGE:  The patient tolerated the procedure well. The patient was transported to the recovery area without difficulties. The patient was discharged home under the care of family in stable and satisfactory condition.    PLAN:  1.  The patient was given our standard instruction sheet.  2.  The patient will resume all medications per the medication reconciliation sheet.  3.  The patient will return to the [facility] for Pain [facility] for reevaluation in approximately 6 weeks.

## 2023-06-15 NOTE — DISCHARGE INSTRUCTIONS
[facility] - Post-procedure Instructions          --  While there are no absolute restrictions, it is recommended that you do not perform strenuous activity today. In the morning, you may resume your level of activity as before your block.    --  If you have a band-aid at your injection site, please remove it later today. Observe the area for any redness, swelling, pus-like drainage, or a temperature over 101°. If any of these symptoms occur, please call your doctor at [phone]. If after office hours, leave a message and the on-call provider will return your call.    --  Ice may be applied to your injection site. It is recommended you avoid direct heat (heating pad; hot tub) for 1-2 days.    --  Call [facility] at [phone] if you experience persistent headache, persistent bleeding from the injection site, or severe pain not relieved by heat or oral medication.    --  Do not make important decisions today.    --  Due to the effects of the block and/or the I.V. Sedation, DO NOT drive or operate hazardous machinery for 12 hours.  Local anesthetics may cause numbness after procedure and precautions must be taken with regards to operating equipment as well as with walking, even if ambulating with assistance of another person or with an assistive device.    --  Do not drink alcohol for 12 hours.    -- You may return to work tomorrow, or as directed by your referring doctor.    --  Occasionally you may notice a slight increase in your pain after the procedure. This should start to improve within the next 24-48 hours. Radiofrequency ablation procedure pain may last 3-4 weeks.    --  It may take as long as 3-4 days before you notice a gradual improvement in your pain and/or other symptoms.    -- You may continue to take your prescribed pain medication as needed.    --  Some normal possible side effects of steroid use could include fluid retention, increased blood sugar, dull headache,  increased sweating, increased appetite, mood swings and flushing.    --  Diabetics are recommended to watch their blood glucose level closely for 24-48 hours after the injection.    --  Must stay in PACU for 20 min upon arrival and prove no leg weakness before being discharged.    --  IN THE EVENT OF A LIFE THREATENING EMERGENCY, (CHEST PAIN, BREATHING DIFFICULTIES, PARALYSIS…) YOU SHOULD GO TO YOUR NEAREST EMERGENCY ROOM.    --  You should be contacted by our office within 2-3 days to schedule follow up or next appointment date.  If not contacted within 7 days, please call the office at [phone]

## 2023-07-21 ENCOUNTER — OFFICE VISIT (OUTPATIENT)
Dept: WOUND CARE | Facility: HOSPITAL | Age: 88
End: 2023-07-21
Payer: MEDICARE

## 2023-07-24 DIAGNOSIS — G89.29 CHRONIC LEFT-SIDED LOW BACK PAIN WITH LEFT-SIDED SCIATICA: ICD-10-CM

## 2023-07-24 DIAGNOSIS — M54.42 CHRONIC LEFT-SIDED LOW BACK PAIN WITH LEFT-SIDED SCIATICA: ICD-10-CM

## 2023-07-24 RX ORDER — CYCLOBENZAPRINE HCL 10 MG
TABLET ORAL
Qty: 90 TABLET | Refills: 1 | Status: SHIPPED | OUTPATIENT
Start: 2023-07-24

## 2023-07-24 RX ORDER — ETODOLAC 200 MG/1
CAPSULE ORAL
Qty: 90 CAPSULE | Refills: 1 | Status: SHIPPED | OUTPATIENT
Start: 2023-07-24

## 2023-07-24 NOTE — TELEPHONE ENCOUNTER
Rx Refill Note  Requested Prescriptions     Pending Prescriptions Disp Refills    cyclobenzaprine (FLEXERIL) 10 MG tablet [Pharmacy Med Name: CYCLOBENZAPRINE 10 MG TABLET] 90 tablet 1     Sig: TAKE ONE TABLET BY MOUTH THREE TIMES A DAY    etodolac (LODINE) 200 MG capsule [Pharmacy Med Name: ETODOLAC 200 MG CAPSULE] 90 capsule 1     Sig: TAKE ONE CAPSULE BY MOUTH THREE TIMES A DAY      Last office visit with prescribing clinician: 5/17/2023   Last telemedicine visit with prescribing clinician: Visit date not found   Next office visit with prescribing clinician: 11/20/2023

## 2023-07-27 ENCOUNTER — OFFICE VISIT (OUTPATIENT)
Dept: PAIN MEDICINE | Facility: CLINIC | Age: 88
End: 2023-07-27
Payer: MEDICARE

## 2023-07-27 ENCOUNTER — OFFICE VISIT (OUTPATIENT)
Dept: WOUND CARE | Facility: HOSPITAL | Age: 88
End: 2023-07-27
Payer: MEDICARE

## 2023-07-27 VITALS
WEIGHT: 165 LBS | RESPIRATION RATE: 18 BRPM | SYSTOLIC BLOOD PRESSURE: 130 MMHG | TEMPERATURE: 97.8 F | OXYGEN SATURATION: 98 % | DIASTOLIC BLOOD PRESSURE: 60 MMHG | HEART RATE: 89 BPM | BODY MASS INDEX: 28.17 KG/M2 | HEIGHT: 64 IN

## 2023-07-27 DIAGNOSIS — G89.4 CHRONIC PAIN SYNDROME: ICD-10-CM

## 2023-07-27 DIAGNOSIS — M47.816 LUMBAR FACET ARTHROPATHY: Primary | ICD-10-CM

## 2023-07-27 DIAGNOSIS — M54.16 LEFT LUMBAR RADICULOPATHY: ICD-10-CM

## 2023-07-27 DIAGNOSIS — M48.061 NEURAL FORAMINAL STENOSIS OF LUMBAR SPINE: ICD-10-CM

## 2023-07-27 DIAGNOSIS — Z79.899 ENCOUNTER FOR LONG-TERM (CURRENT) DRUG USE: ICD-10-CM

## 2023-07-27 PROCEDURE — 1159F MED LIST DOCD IN RCRD: CPT

## 2023-07-27 PROCEDURE — 99214 OFFICE O/P EST MOD 30 MIN: CPT

## 2023-07-27 PROCEDURE — 1125F AMNT PAIN NOTED PAIN PRSNT: CPT

## 2023-07-27 PROCEDURE — 1160F RVW MEDS BY RX/DR IN RCRD: CPT

## 2023-07-27 RX ORDER — GABAPENTIN 100 MG/1
CAPSULE ORAL
Qty: 60 CAPSULE | Refills: 0 | Status: SHIPPED | OUTPATIENT
Start: 2023-07-27

## 2023-07-27 RX ORDER — HYDROCODONE BITARTRATE AND ACETAMINOPHEN 5; 325 MG/1; MG/1
1 TABLET ORAL 2 TIMES DAILY PRN
Qty: 60 TABLET | Refills: 0 | Status: SHIPPED | OUTPATIENT
Start: 2023-07-27

## 2023-07-27 RX ORDER — NALOXONE HYDROCHLORIDE 4 MG/.1ML
1 SPRAY NASAL AS NEEDED
Qty: 2 EACH | Refills: 0 | Status: SHIPPED | OUTPATIENT
Start: 2023-07-27

## 2023-07-27 RX ORDER — GABAPENTIN 100 MG/1
100 CAPSULE ORAL 2 TIMES DAILY
Qty: 60 CAPSULE | Refills: 0 | Status: CANCELLED | OUTPATIENT
Start: 2023-07-27

## 2023-07-27 NOTE — PROGRESS NOTES
CHIEF COMPLAINT  Back pain    Subjective   Shanique Martinez is a 87 y.o. female  who presents to the office for follow-up of procedure.  She completed a Left L3-L5 RFA on 6/15/23 performed by Dr. Sharp for management of low back pain. Patient reports 50% relief from the procedure. She reports that back pain has improved following the procedure but continues to report intermittent back down left lateral/posterior leg.    Today pain is 6/10VAS in severity. Pain is located in her low back, mostly on the left side. Pain radiates down left lateral/posterior thigh. Describes this pain as an intermittent ache. She reports that the radiating pain has been less frequent since her epidural. Pain is worsened by increased physical activity, household chores  that require twisting or prolonged standing, and walking long distances. Pain improves with rest/reposition, use of a TENS unit, and medications. Continues with Etodolac 200mg TID, Tylenol Arthritis PRN and Flexeril 10mg 2-3/day.      She continues with Hydrocodone 5mg 1-2/day.This medication allows her to be more active around her house and rest better at night. Denies any side effects from the regimen, including constipation and somnolence. Denies any bowel or bladder changes.      Patient saw Dr. Ludwig St on 8/2/2022 - recommended patient avoid surgery     She gets bilateral knee gel injections with Dr. Clark every 6 months. She received these injections on 7/17/23.      Procedures:  6/15/23 - Left L3-L5 RFA - 50% ongoing relief to back pain, no relief to leg pain  5/25/23 - Left L3-L5 MBB - 80% relief x 2 days  3/23/23 - Left L3-L5 MBB -  80% relief x 4 days  1/5/23 - L4/L5 LESI - 80% ongoing relief  10/27/22 - Left L4 TF LESI - 95% relief x 2 weeks    Back Pain  This is a chronic problem. The problem occurs intermittently. The problem has been waxing and waning since onset. The pain is present in the lumbar spine (L side > R side). The quality of the pain is  described as aching. The pain radiates to the left thigh (left lateral/posterior thigh). The pain is at a severity of 6/10. The symptoms are aggravated by standing and position (increased physical activity, prolonged standing, walking long distances). Associated symptoms include weakness. Pertinent negatives include no abdominal pain, chest pain, dysuria, fever, headaches or numbness. Risk factors include sedentary lifestyle. She has tried muscle relaxant and NSAIDs (rest/reposition, PT in the past, TENS unit, Tylenol, Lodine, Flexeril) for the symptoms.      PEG Assessment   What number best describes your pain on average in the past week?8  What number best describes how, during the past week, pain has interfered with your enjoyment of life?9  What number best describes how, during the past week, pain has interfered with your general activity?  5    Review of Pertinent Medical Data ---  RADIOLOGY REPORT     FACILITY:  [facility]   UNIT/AGE/GENDER: ND.MRI  OP      AGE:86 Y          SEX:F   PATIENT NAME/:  CATHLEEN GALE S    1935   UNIT NUMBER:  [id number]   ACCOUNT NUMBER:  [id number]   ACCESSION NUMBER:  [id number]     MRI lumbar spine without contrast,     2022 at 0837 hours     HISTORY: Low back pain.         TECHNIQUE: Multiplanar multisequence imaging was obtained of the lumbar spine on a 3 Ruby magnet. Images were obtained without IV gadolinium.     COMPARISON: No     FINDINGS: The conus terminates at L1. The signal within the conus is within normal limits. The cauda equina is unremarkable.     There is moderate dextroscoliosis with the apex at L3. There is left lateral translation of L2 on L3. There is 3 mm anterolisthesis of L4 and L5 secondary to facet disease. There is mild-to-moderate multilevel degenerative disc disease. No fracture or   pars defect is demonstrated. The paravertebral soft tissues are unremarkable.     At T11-T12 broad-based disc bulge and  facet disease resulting in moderate right neuroforaminal narrowing     T12-L1: A broad-based disc bulge and facet disease resulting in mild-to-moderate right neuroforaminal narrowing     L1-L2: A broad-based disc bulge, posterior osseous lipping and facet disease resulting in moderate left neuroforaminal narrowing.     L2-L3: A broad-based disc bulge, posterior osteophytes with facet disease resulting in moderate left neuroforaminal narrowing.     L3-L4: A broad-based disc bulge and facet disease resulting in moderate left neuroforaminal narrowing.     L4-L5: There is 3 mm anterolisthesis of L4 and L5 secondary to severe facet disease. There is uncovering of the disc. There is severe left-sided neuroforaminal narrowing and mild spinal stenosis.     L5-S1: A broad-based disc bulge and facet disease result in mild bilateral neuroforaminal narrowing     IMPRESSION:     1. Moderate dextroscoliosis with the apex at L3. There is left lateral translation of L2 on L3.   2. There is 3 mm anterolisthesis of L4 and L5 secondary to facet disease. Uncovering of the disc is resulting in severe left neuroforaminal narrowing and mild spinal stenosis.   3. Mild-to-moderate degenerative spondylosis.   4. At L1-L2 and L2-L3 degenerative spondylosis resulting in moderate left neuroforaminal narrowing     Dictated by: Patricia Mcgee M.D.     Images and Report reviewed and interpreted by: Patricia Mcgee M.D.     <PS><Electronically signed by: Patricia Mcgee M.D.>  04/23/2022 1230     The following portions of the patient's history were reviewed and updated as appropriate: allergies, current medications, past family history, past medical history, past social history, past surgical history, and problem list.    Review of Systems   Constitutional:  Negative for activity change, fatigue and fever.   Respiratory:  Negative for cough and chest tightness.    Cardiovascular:  Negative for chest pain.   Gastrointestinal:  Negative for  "abdominal pain, constipation and diarrhea.   Genitourinary:  Negative for difficulty urinating and dysuria.   Musculoskeletal:  Positive for back pain.   Neurological:  Positive for weakness. Negative for dizziness, light-headedness, numbness and headaches.   Psychiatric/Behavioral:  Negative for agitation, sleep disturbance and suicidal ideas. The patient is not nervous/anxious.      I have reviewed and confirmed the accuracy of the ROS as documented by the MA/LPN/RN LALITHA Best     Vitals:    07/27/23 1250   BP: 130/60   BP Location: Left arm   Patient Position: Sitting   Cuff Size: Adult   Pulse: 89   Resp: 18   Temp: 97.8 °F (36.6 °C)   TempSrc: Temporal   SpO2: 98%   Weight: 74.8 kg (165 lb)   Height: 162.6 cm (64\")   PainSc:   6     Objective   Physical Exam  Constitutional:       Appearance: Normal appearance.   HENT:      Head: Normocephalic.   Cardiovascular:      Rate and Rhythm: Normal rate and regular rhythm.   Pulmonary:      Effort: Pulmonary effort is normal.      Breath sounds: Normal breath sounds.   Musculoskeletal:      Cervical back: Tenderness (left side) present. Pain with movement and muscular tenderness present. Decreased range of motion.      Lumbar back: No tenderness or bony tenderness. Positive right straight leg raise test and positive left straight leg raise test.      Comments: + lumbar facet loading/tenderness   Skin:     General: Skin is warm and dry.      Capillary Refill: Capillary refill takes less than 2 seconds.   Neurological:      General: No focal deficit present.      Mental Status: She is alert and oriented to person, place, and time.      Gait: Gait abnormal (slowed, ambulates with walker).   Psychiatric:         Mood and Affect: Mood normal.         Behavior: Behavior normal.         Thought Content: Thought content normal.         Cognition and Memory: Cognition normal.     Assessment & Plan   Diagnoses and all orders for this visit:    1. Lumbar facet " arthropathy (Primary)  -     HYDROcodone-acetaminophen (NORCO) 5-325 MG per tablet; Take 1 tablet by mouth 2 (Two) Times a Day As Needed (Pain). 30 day supply  Dispense: 60 tablet; Refill: 0    2. Left lumbar radiculopathy  -     gabapentin (NEURONTIN) 100 MG capsule; Take 1 capsule at night x 7 days. If tolerating well, may increase to 1 capsule BID.  Dispense: 60 capsule; Refill: 0    3. Chronic pain syndrome    4. Neural foraminal stenosis of lumbar spine    5. Encounter for long-term (current) drug use  -     naloxone (NARCAN) 4 MG/0.1ML nasal spray; 1 spray into the nostril(s) as directed by provider As Needed (for accidental overdose or respiratory depression).  Dispense: 2 each; Refill: 0    --- The urine drug screen confirmation from 5/2/23 has been reviewed and the result is appropriate based on patient history and ELE report  --- The patient signed an updated copy of the controlled substance agreement on 5/2/23  --- Opioid risk assessment completed - low risk for misuse/abuse  --- Will trial a low dose Gabapentin to see if this helps with radicular pain. The patient will be started on a trial of gabapentin. she will be started on gabapentin 100 mg once nightly for one week. Will then increase to twice daily for one week. Reviewed potential side effects, including somnolence and dizziness.   --- Prescription for Narcan sent to pharmacy.   --- Consider repeating LESI if radicular symptoms were to worsen   --- Continue Hydrocodone. DNF 7/30/23. Patient appears stable with current regimen. No adverse effects. Regarding continuation of opioids, there is no evidence of aberrant behavior or any red flags.  The patient continues with appropriate response to opioid therapy. ADL's remain intact by self.   --- Follow up 1 month     Shanique Martinez reports a pain score of 6.  Given her pain assessment as noted, treatment options were discussed and the following options were decided upon as a follow-up plan to  address the patient's pain: continuation of current treatment plan for pain, prescription for opiod analgesics, and trial low dose Gabapentin to see if this medication helps improve radicular pain .     ELE REPORT  As part of the patient's treatment plan, I am prescribing controlled substances. The patient has been made aware of appropriate use of such medications, including potential risk of somnolence, limited ability to drive and/or work safely, and the potential for dependence or overdose. It has also been made clear that these medications are for use by this patient only, without concomitant use of alcohol or other substances unless prescribed.     Patient has completed prescribing agreement detailing terms of continued prescribing of controlled substances, including monitoring ELE reports, urine drug screening, and pill counts if necessary. The patient is aware that inappropriate use will results in cessation of prescribing such medications.    As the clinician, I personally reviewed the ELE from 7/27/23 while the patient was in the office today.    History and physical exam exhibit continued safe and appropriate use of controlled substances.    Dictated utilizing Dragon dictation.

## 2023-08-04 ENCOUNTER — OFFICE VISIT (OUTPATIENT)
Dept: WOUND CARE | Facility: HOSPITAL | Age: 88
End: 2023-08-04
Payer: MEDICARE

## 2023-08-04 PROCEDURE — 97602 WOUND(S) CARE NON-SELECTIVE: CPT

## 2023-08-21 ENCOUNTER — OFFICE VISIT (OUTPATIENT)
Dept: WOUND CARE | Facility: HOSPITAL | Age: 88
End: 2023-08-21
Payer: MEDICARE

## 2023-08-21 ENCOUNTER — LAB REQUISITION (OUTPATIENT)
Dept: LAB | Facility: HOSPITAL | Age: 88
End: 2023-08-21
Payer: MEDICARE

## 2023-08-21 DIAGNOSIS — L97.222 NON-PRESSURE CHRONIC ULCER OF LEFT CALF WITH FAT LAYER EXPOSED: ICD-10-CM

## 2023-08-21 PROCEDURE — G0463 HOSPITAL OUTPT CLINIC VISIT: HCPCS

## 2023-08-21 PROCEDURE — 87186 SC STD MICRODIL/AGAR DIL: CPT | Performed by: NURSE PRACTITIONER

## 2023-08-21 PROCEDURE — 87015 SPECIMEN INFECT AGNT CONCNTJ: CPT | Performed by: NURSE PRACTITIONER

## 2023-08-21 PROCEDURE — 87205 SMEAR GRAM STAIN: CPT | Performed by: NURSE PRACTITIONER

## 2023-08-21 PROCEDURE — 87147 CULTURE TYPE IMMUNOLOGIC: CPT | Performed by: NURSE PRACTITIONER

## 2023-08-21 PROCEDURE — 87075 CULTR BACTERIA EXCEPT BLOOD: CPT | Performed by: NURSE PRACTITIONER

## 2023-08-21 PROCEDURE — 87070 CULTURE OTHR SPECIMN AEROBIC: CPT | Performed by: NURSE PRACTITIONER

## 2023-08-23 LAB
BACTERIA SPEC AEROBE CULT: ABNORMAL
GRAM STN SPEC: ABNORMAL

## 2023-08-26 LAB — BACTERIA SPEC ANAEROBE CULT: NORMAL

## 2023-08-28 ENCOUNTER — OFFICE VISIT (OUTPATIENT)
Dept: PAIN MEDICINE | Facility: CLINIC | Age: 88
End: 2023-08-28
Payer: MEDICARE

## 2023-08-28 VITALS
TEMPERATURE: 97.5 F | HEIGHT: 64 IN | BODY MASS INDEX: 28.51 KG/M2 | OXYGEN SATURATION: 95 % | HEART RATE: 92 BPM | SYSTOLIC BLOOD PRESSURE: 146 MMHG | WEIGHT: 167 LBS | DIASTOLIC BLOOD PRESSURE: 84 MMHG

## 2023-08-28 DIAGNOSIS — Z79.899 ENCOUNTER FOR LONG-TERM (CURRENT) DRUG USE: ICD-10-CM

## 2023-08-28 DIAGNOSIS — M47.816 LUMBAR FACET ARTHROPATHY: Primary | ICD-10-CM

## 2023-08-28 DIAGNOSIS — M54.16 LEFT LUMBAR RADICULOPATHY: ICD-10-CM

## 2023-08-28 DIAGNOSIS — G89.4 CHRONIC PAIN SYNDROME: ICD-10-CM

## 2023-08-28 DIAGNOSIS — M48.061 NEURAL FORAMINAL STENOSIS OF LUMBAR SPINE: ICD-10-CM

## 2023-08-28 PROCEDURE — 1159F MED LIST DOCD IN RCRD: CPT

## 2023-08-28 PROCEDURE — 1160F RVW MEDS BY RX/DR IN RCRD: CPT

## 2023-08-28 PROCEDURE — 99213 OFFICE O/P EST LOW 20 MIN: CPT

## 2023-08-28 PROCEDURE — 1125F AMNT PAIN NOTED PAIN PRSNT: CPT

## 2023-08-28 RX ORDER — GABAPENTIN 100 MG/1
100 CAPSULE ORAL 3 TIMES DAILY
Qty: 90 CAPSULE | Refills: 0 | Status: SHIPPED | OUTPATIENT
Start: 2023-08-28

## 2023-08-28 RX ORDER — CLINDAMYCIN HYDROCHLORIDE 300 MG/1
300 CAPSULE ORAL 3 TIMES DAILY
COMMUNITY
Start: 2023-08-25

## 2023-08-28 RX ORDER — GABAPENTIN 100 MG/1
CAPSULE ORAL
Qty: 60 CAPSULE | OUTPATIENT
Start: 2023-08-28

## 2023-08-28 RX ORDER — HYDROCODONE BITARTRATE AND ACETAMINOPHEN 5; 325 MG/1; MG/1
1 TABLET ORAL 2 TIMES DAILY PRN
Qty: 60 TABLET | Refills: 0 | Status: SHIPPED | OUTPATIENT
Start: 2023-08-28

## 2023-08-28 NOTE — PROGRESS NOTES
CHIEF COMPLAINT  Back pain    Subjective   Shanique Martinez is a 87 y.o. female  who presents for follow-up.  She has a history of chronic low back pain. She reports that her pain has slightly improved since her last office visit.    Today pain is 4/10VAS in severity. Pain is located in her low back, mostly on the left side. Pain radiates down left lateral/posterior thigh. Describes this pain as an intermittent ache. She reports that the radiating pain has been less frequent since her epidural. Pain is worsened by increased physical activity, household chores  that require twisting or prolonged standing, and walking long distances. Pain improves with rest/reposition, use of a TENS unit, and medications.      She continues with Hydrocodone 5mg 1-2/day, Etodolac 200mg TID, Tylenol Arthritis PRN and Flexeril 10mg 2-3/day. She was started on Gabapentin 100mg at her last office visit and reports that this medication has been helpful to her pain. She states that it has allowed her to walk and sit for longer periods of time without experiencing increased pain. Denies any side effects from the regimen, including constipation and somnolence. Denies any bowel or bladder changes.     Patient saw Dr. Ludwig St on 8/2/2022 - recommended patient avoid surgery     She gets bilateral knee gel injections with Dr. Clark every 6 months. She received these injections on 7/17/23.      Procedures:  6/15/23 - Left L3-L5 RFA - 50% ongoing relief to back pain, no relief to leg pain  5/25/23 - Left L3-L5 MBB - 80% relief x 2 days  3/23/23 - Left L3-L5 MBB -  80% relief x 4 days  1/5/23 - L4/L5 LESI - 80% ongoing relief  10/27/22 - Left L4 TF LESI - 95% relief x 2 weeks     Back Pain  This is a chronic problem. The problem occurs intermittently. The problem has been gradually improving since onset. The pain is present in the lumbar spine (L side > R side). The quality of the pain is described as aching. The pain radiates to the left  thigh (left lateral/posterior thigh). The pain is at a severity of 4/10. The symptoms are aggravated by standing and position (increased physical activity, prolonged standing, walking long distances). Associated symptoms include weakness. Pertinent negatives include no abdominal pain, chest pain, dysuria, fever, headaches or numbness. Risk factors include sedentary lifestyle. She has tried muscle relaxant and NSAIDs (rest/reposition, PT in the past, TENS unit, Tylenol, Lodine, Flexeril) for the symptoms.      PEG Assessment   What number best describes your pain on average in the past week?5  What number best describes how, during the past week, pain has interfered with your enjoyment of life?5  What number best describes how, during the past week, pain has interfered with your general activity?  7    The following portions of the patient's history were reviewed and updated as appropriate: allergies, current medications, past family history, past medical history, past social history, past surgical history, and problem list.    Review of Systems   Constitutional:  Positive for activity change (decreased) and fatigue. Negative for chills and fever.   HENT:  Negative for congestion.    Eyes:  Negative for visual disturbance.   Respiratory:  Negative for chest tightness and shortness of breath.    Cardiovascular:  Negative for chest pain.   Gastrointestinal:  Negative for abdominal pain, constipation and diarrhea.   Genitourinary:  Negative for difficulty urinating, dyspareunia and dysuria.   Musculoskeletal:  Positive for back pain.   Neurological:  Positive for weakness. Negative for dizziness, light-headedness, numbness and headaches.   Psychiatric/Behavioral:  Negative for agitation and sleep disturbance. The patient is not nervous/anxious.      I have reviewed and confirmed the accuracy of the ROS as documented by the MA/LPN/RN LALITHA Best    Vitals:    08/28/23 1108   BP: 146/84   Pulse: 92   Temp: 97.5  "øF (36.4 øC)   SpO2: 95%   Weight: 75.8 kg (167 lb)   Height: 162.6 cm (64\")   PainSc:   4   PainLoc: Back     Objective   Physical Exam  Constitutional:       Appearance: Normal appearance.   HENT:      Head: Normocephalic.   Cardiovascular:      Rate and Rhythm: Normal rate and regular rhythm.   Pulmonary:      Effort: Pulmonary effort is normal.      Breath sounds: Normal breath sounds.   Musculoskeletal:      Cervical back: Tenderness (left side) present. Pain with movement and muscular tenderness present. Decreased range of motion.      Lumbar back: No tenderness or bony tenderness. Positive right straight leg raise test and positive left straight leg raise test.      Comments: + lumbar facet loading/tenderness   Skin:     General: Skin is warm and dry.      Capillary Refill: Capillary refill takes less than 2 seconds.   Neurological:      General: No focal deficit present.      Mental Status: She is alert and oriented to person, place, and time.      Gait: Gait abnormal (slowed, ambulates with walker).   Psychiatric:         Mood and Affect: Mood normal.         Behavior: Behavior normal.         Thought Content: Thought content normal.         Cognition and Memory: Cognition normal.     Assessment & Plan   Diagnoses and all orders for this visit:    1. Lumbar facet arthropathy (Primary)  -     HYDROcodone-acetaminophen (NORCO) 5-325 MG per tablet; Take 1 tablet by mouth 2 (Two) Times a Day As Needed (Pain). 30 day supply  Dispense: 60 tablet; Refill: 0    2. Left lumbar radiculopathy  -     gabapentin (NEURONTIN) 100 MG capsule; Take 1 capsule by mouth 3 (Three) Times a Day.  Dispense: 90 capsule; Refill: 0    3. Chronic pain syndrome    4. Neural foraminal stenosis of lumbar spine    5. Encounter for long-term (current) drug use    --- The urine drug screen confirmation from 5/2/23 has been reviewed and the result is appropriate based on patient history and ELE report  --- The patient signed an updated " copy of the controlled substance agreement on 5/2/23  --- Continue Gabapentin. Will increased to TID. Refill sent to pharmacy.  --- Opioid risk assessment completed - low risk for misuse/abuse   --- Continue Hydrocodone. DNF 8/29/23. Patient appears stable with current regimen. No adverse effects. Regarding continuation of opioids, there is no evidence of aberrant behavior or any red flags.  The patient continues with appropriate response to opioid therapy. ADL's remain intact by self.   --- Repeat interventional procedures as needed  --- Follow-up 2 months or sooner if needed    Shanique Martinez reports a pain score of 4.  Given her pain assessment as noted, treatment options were discussed and the following options were decided upon as a follow-up plan to address the patient's pain: continuation of current treatment plan for pain, prescription for opiod analgesics, and repeat interventional procedures as needed.       ELE REPORT  As part of the patient's treatment plan, I am prescribing controlled substances. The patient has been made aware of appropriate use of such medications, including potential risk of somnolence, limited ability to drive and/or work safely, and the potential for dependence or overdose. It has also been made clear that these medications are for use by this patient only, without concomitant use of alcohol or other substances unless prescribed.     Patient has completed prescribing agreement detailing terms of continued prescribing of controlled substances, including monitoring ELE reports, urine drug screening, and pill counts if necessary. The patient is aware that inappropriate use will results in cessation of prescribing such medications.    As the clinician, I personally reviewed the ELE from 8/28/23 while the patient was in the office today.    History and physical exam exhibit continued safe and appropriate use of controlled substances.    Dictated utilizing Dragon dictation.

## 2023-09-08 ENCOUNTER — OFFICE VISIT (OUTPATIENT)
Dept: WOUND CARE | Facility: HOSPITAL | Age: 88
End: 2023-09-08
Payer: MEDICARE

## 2023-09-08 PROCEDURE — G0463 HOSPITAL OUTPT CLINIC VISIT: HCPCS

## 2023-09-22 ENCOUNTER — TELEPHONE (OUTPATIENT)
Dept: INTERNAL MEDICINE | Facility: CLINIC | Age: 88
End: 2023-09-22
Payer: MEDICARE

## 2023-09-22 DIAGNOSIS — G89.29 CHRONIC LEFT-SIDED LOW BACK PAIN WITH LEFT-SIDED SCIATICA: ICD-10-CM

## 2023-09-22 DIAGNOSIS — M54.42 CHRONIC LEFT-SIDED LOW BACK PAIN WITH LEFT-SIDED SCIATICA: ICD-10-CM

## 2023-09-22 RX ORDER — ETODOLAC 200 MG/1
200 CAPSULE ORAL 3 TIMES DAILY
Qty: 90 CAPSULE | Refills: 1 | Status: SHIPPED | OUTPATIENT
Start: 2023-09-22

## 2023-09-22 RX ORDER — CYCLOBENZAPRINE HCL 10 MG
10 TABLET ORAL 3 TIMES DAILY
Qty: 90 TABLET | Refills: 1 | Status: SHIPPED | OUTPATIENT
Start: 2023-09-22

## 2023-09-22 NOTE — TELEPHONE ENCOUNTER
Rx Refill Note  Requested Prescriptions     Pending Prescriptions Disp Refills    cyclobenzaprine (FLEXERIL) 10 MG tablet 90 tablet 1     Sig: Take 1 tablet by mouth 3 (Three) Times a Day.    etodolac (LODINE) 200 MG capsule 90 capsule 1     Sig: Take 1 capsule by mouth 3 (Three) Times a Day.      Last office visit with prescribing clinician: 5/17/2023   Last telemedicine visit with prescribing clinician: Visit date not found   Next office visit with prescribing clinician: 11/20/2023         Sonja Goff MA  09/22/23, 14:19 EDT

## 2023-09-22 NOTE — TELEPHONE ENCOUNTER
Caller: Shanique Martinez    Relationship: Self    Best call back number: [phone]    Requested Prescriptions:   Requested Prescriptions     Pending Prescriptions Disp Refills    cyclobenzaprine (FLEXERIL) 10 MG tablet 90 tablet 1     Sig: Take 1 tablet by mouth 3 (Three) Times a Day.    etodolac (LODINE) 200 MG capsule 90 capsule 1     Sig: Take 1 capsule by mouth 3 (Three) Times a Day.        Pharmacy where request should be sent: [facility] [id number] [address] [phone] [facility] [phone] FX     Last office visit with prescribing clinician: 5/17/2023   Last telemedicine visit with prescribing clinician: Visit date not found   Next office visit with prescribing clinician: 11/20/2023     Additional details provided by patient:     Does the patient have less than a 3 day supply:  [x] Yes  [] No    Would you like a call back once the refill request has been completed: [] Yes [] No    If the office needs to give you a call back, can they leave a voicemail: [] Yes [] No    Shira Benton   09/22/23 14:04 EDT

## 2023-09-29 DIAGNOSIS — M54.16 LEFT LUMBAR RADICULOPATHY: ICD-10-CM

## 2023-09-29 DIAGNOSIS — M47.816 LUMBAR FACET ARTHROPATHY: ICD-10-CM

## 2023-09-29 RX ORDER — HYDROCODONE BITARTRATE AND ACETAMINOPHEN 5; 325 MG/1; MG/1
1 TABLET ORAL 2 TIMES DAILY PRN
Qty: 60 TABLET | Refills: 0 | Status: SHIPPED | OUTPATIENT
Start: 2023-09-29

## 2023-09-29 RX ORDER — GABAPENTIN 100 MG/1
100 CAPSULE ORAL 3 TIMES DAILY
Qty: 90 CAPSULE | Refills: 0 | Status: SHIPPED | OUTPATIENT
Start: 2023-09-29

## 2023-09-29 NOTE — TELEPHONE ENCOUNTER
Caller: Juan Shanique SIMMONS    Relationship: Self    Best call back number: [phone]    Requested Prescriptions:   Requested Prescriptions     Pending Prescriptions Disp Refills    HYDROcodone-acetaminophen (NORCO) 5-325 MG per tablet 60 tablet 0     Sig: Take 1 tablet by mouth 2 (Two) Times a Day As Needed (Pain). 30 day supply        Pharmacy where request should be sent: [facility] [id number] [address] [phone] [facility] [phone] FX     Last office visit with prescribing clinician: 8/28/2023   Last telemedicine visit with prescribing clinician: Visit date not found   Next office visit with prescribing clinician: 10/23/2023     Additional details provided by patient: PATIENT HAS 4 PILLS LEFT, TAKING TWO PER DAY. ASKING FOR REFILL.     Does the patient have less than a 3 day supply:  [x] Yes  [] No      Shira Nichols   09/29/23 09:37 EDT

## 2023-09-29 NOTE — TELEPHONE ENCOUNTER
Rx Refill Note  Requested Prescriptions     Pending Prescriptions Disp Refills    gabapentin (NEURONTIN) 100 MG capsule 90 capsule 0     Sig: Take 1 capsule by mouth 3 (Three) Times a Day.      Last office visit with prescribing clinician: 8/28/2023   Last telemedicine visit with prescribing clinician: Visit date not found   Next office visit with prescribing clinician: 10/23/2023                         Would you like a call back once the refill request has been completed: [] Yes [] No    If the office needs to give you a call back, can they leave a voicemail: [] Yes [] No    Cosme Hess CMA  09/29/23, 12:01 EDT

## 2023-10-06 DIAGNOSIS — I87.303 STASIS EDEMA OF BOTH LOWER EXTREMITIES: ICD-10-CM

## 2023-10-06 RX ORDER — FUROSEMIDE 20 MG/1
TABLET ORAL
Qty: 90 TABLET | Refills: 1 | Status: SHIPPED | OUTPATIENT
Start: 2023-10-06

## 2023-10-06 RX ORDER — POTASSIUM CHLORIDE 750 MG/1
TABLET, EXTENDED RELEASE ORAL
Qty: 90 TABLET | Refills: 1 | Status: SHIPPED | OUTPATIENT
Start: 2023-10-06

## 2023-10-30 ENCOUNTER — OFFICE VISIT (OUTPATIENT)
Dept: PAIN MEDICINE | Facility: CLINIC | Age: 88
End: 2023-10-30
Payer: MEDICARE

## 2023-10-30 VITALS
DIASTOLIC BLOOD PRESSURE: 72 MMHG | WEIGHT: 168.4 LBS | HEIGHT: 64 IN | RESPIRATION RATE: 12 BRPM | TEMPERATURE: 98.1 F | OXYGEN SATURATION: 95 % | HEART RATE: 85 BPM | BODY MASS INDEX: 28.75 KG/M2 | SYSTOLIC BLOOD PRESSURE: 112 MMHG

## 2023-10-30 DIAGNOSIS — Z79.899 ENCOUNTER FOR LONG-TERM (CURRENT) DRUG USE: ICD-10-CM

## 2023-10-30 DIAGNOSIS — M47.816 LUMBAR FACET ARTHROPATHY: ICD-10-CM

## 2023-10-30 DIAGNOSIS — M54.16 LEFT LUMBAR RADICULOPATHY: Primary | ICD-10-CM

## 2023-10-30 DIAGNOSIS — M48.061 NEURAL FORAMINAL STENOSIS OF LUMBAR SPINE: ICD-10-CM

## 2023-10-30 DIAGNOSIS — G89.4 CHRONIC PAIN SYNDROME: ICD-10-CM

## 2023-10-30 PROCEDURE — 1160F RVW MEDS BY RX/DR IN RCRD: CPT

## 2023-10-30 PROCEDURE — 1159F MED LIST DOCD IN RCRD: CPT

## 2023-10-30 PROCEDURE — 99213 OFFICE O/P EST LOW 20 MIN: CPT

## 2023-10-30 PROCEDURE — 1125F AMNT PAIN NOTED PAIN PRSNT: CPT

## 2023-10-30 RX ORDER — GABAPENTIN 100 MG/1
100 CAPSULE ORAL 3 TIMES DAILY
Qty: 90 CAPSULE | Refills: 0 | Status: SHIPPED | OUTPATIENT
Start: 2023-10-30

## 2023-10-30 RX ORDER — HYDROCODONE BITARTRATE AND ACETAMINOPHEN 5; 325 MG/1; MG/1
1 TABLET ORAL 2 TIMES DAILY PRN
Qty: 60 TABLET | Refills: 0 | Status: SHIPPED | OUTPATIENT
Start: 2023-10-30

## 2023-10-30 NOTE — PROGRESS NOTES
CHIEF COMPLAINT  Back pain    Subjective   Shanique Martinez is a 87 y.o. female  who presents for follow-up.  She has a history of chronic low back pain. She reports that her pain has slightly improved since her last office visit.     Today pain is 1/10VAS in severity. Pain is located in her low back, mostly on the left side. Pain radiates down left lateral/posterior thigh. Describes this pain as an intermittent ache. She reports that the radiating pain has been less frequent since her epidural. Pain is worsened by increased physical activity, household chores  that require twisting or prolonged standing, and walking long distances. Pain improves with rest/reposition, use of a TENS unit, and medications.      She continues with Hydrocodone 5mg 1-2/day, Gabapentin 100mg TID (reports increased has been helpful), Etodolac 200mg TID, Tylenol Arthritis PRN and Flexeril 10mg 2-3/day. Denies any side effects from the regimen, including constipation and somnolence. Denies any bowel or bladder changes.      Patient saw Dr. Ludwig St on 8/2/2022 - recommended patient avoid surgery     She gets bilateral knee gel injections with Dr. Clark every 6 months. She received these injections on 7/17/23.      Procedures:  6/15/23 - Left L3-L5 RFA - 50% ongoing relief to back pain  5/25/23 - Left L3-L5 MBB - 80% relief x 2 days  3/23/23 - Left L3-L5 MBB -  80% relief x 4 days  1/5/23 - L4/L5 LESI - 80% ongoing relief  10/27/22 - Left L4 TF LESI - 95% relief x 2 weeks    Back Pain  This is a chronic problem. The problem occurs intermittently. The problem has been gradually improving since onset. The pain is present in the lumbar spine (L side > R side). The quality of the pain is described as aching. The pain radiates to the left thigh (left lateral/posterior thigh). The pain is at a severity of 1/10. The symptoms are aggravated by standing and position (increased physical activity, prolonged standing, walking long distances).  "Associated symptoms include weakness. Pertinent negatives include no abdominal pain, chest pain, dysuria, fever, headaches or numbness. Risk factors include sedentary lifestyle. She has tried muscle relaxant and NSAIDs (rest/reposition, PT in the past, TENS unit, Tylenol, Lodine, Flexeril) for the symptoms.      PEG Assessment   What number best describes your pain on average in the past week?5  What number best describes how, during the past week, pain has interfered with your enjoyment of life?0  What number best describes how, during the past week, pain has interfered with your general activity?  0    The following portions of the patient's history were reviewed and updated as appropriate: allergies, current medications, past family history, past medical history, past social history, past surgical history, and problem list.    Review of Systems   Constitutional:  Negative for activity change, fatigue and fever.   HENT:  Negative for congestion.    Respiratory:  Negative for cough and chest tightness.    Cardiovascular:  Negative for chest pain.   Gastrointestinal:  Negative for abdominal pain, constipation and diarrhea.   Genitourinary:  Negative for difficulty urinating and dysuria.   Musculoskeletal:  Positive for back pain.   Neurological:  Positive for weakness. Negative for dizziness, light-headedness, numbness and headaches.   Psychiatric/Behavioral:  Negative for agitation, sleep disturbance and suicidal ideas. The patient is not nervous/anxious.      I have reviewed and confirmed the accuracy of the ROS as documented by the MA/LPN/RN LALITHA Best    Vitals:    10/30/23 1601   BP: 112/72   BP Location: Left arm   Patient Position: Sitting   Cuff Size: Adult   Pulse: 85   Resp: 12   Temp: 98.1 °F (36.7 °C)   TempSrc: Temporal   SpO2: 95%   Weight: 76.4 kg (168 lb 6.4 oz)   Height: 162.6 cm (64\")   PainSc:   1     Objective   Physical Exam  Constitutional:       Appearance: Normal appearance. "   HENT:      Head: Normocephalic.   Cardiovascular:      Rate and Rhythm: Normal rate and regular rhythm.   Pulmonary:      Effort: Pulmonary effort is normal.      Breath sounds: Normal breath sounds.   Musculoskeletal:      Cervical back: Tenderness (left side) present. Pain with movement and muscular tenderness present. Decreased range of motion.      Lumbar back: No tenderness or bony tenderness. Positive right straight leg raise test and positive left straight leg raise test.      Comments: + lumbar facet loading/tenderness   Skin:     General: Skin is warm and dry.      Capillary Refill: Capillary refill takes less than 2 seconds.   Neurological:      General: No focal deficit present.      Mental Status: She is alert and oriented to person, place, and time.      Gait: Gait abnormal (slowed, ambulates with walker).   Psychiatric:         Mood and Affect: Mood normal.         Behavior: Behavior normal.         Thought Content: Thought content normal.         Cognition and Memory: Cognition normal.       Assessment & Plan   Diagnoses and all orders for this visit:    1. Left lumbar radiculopathy (Primary)  -     gabapentin (NEURONTIN) 100 MG capsule; Take 1 capsule by mouth 3 (Three) Times a Day.  Dispense: 90 capsule; Refill: 0    2. Lumbar facet arthropathy  -     HYDROcodone-acetaminophen (NORCO) 5-325 MG per tablet; Take 1 tablet by mouth 2 (Two) Times a Day As Needed (Pain). 30 day supply  Dispense: 60 tablet; Refill: 0    3. Chronic pain syndrome    4. Neural foraminal stenosis of lumbar spine    5. Encounter for long-term (current) drug use    --- Routine UDS in office today as part of monitoring requirements for controlled substances.  The specimen was viewed and the immunoassay result reviewed and is +OPI.  This specimen will be sent to [facility] laboratory for confirmation.     --- The patient signed an updated copy of the controlled substance agreement on 5/2/23   --- Opioid risk assessment completed  - low risk for misuse/abuse   --- Narcan prescribed in July was cost prohibited.  --- Continue Gabapentin. Refill sent to pharmacy.   --- Continue Hydrocodone. Patient appears stable with current regimen. No adverse effects. Regarding continuation of opioids, there is no evidence of aberrant behavior or any red flags.  The patient continues with appropriate response to opioid therapy. ADL's remain intact by self.   --- Repeat interventional procedures as needed  --- Follow-up 2 months or sooner if needed    Shanique Martinez reports a pain score of 1.  Given her pain assessment as noted, treatment options were discussed and the following options were decided upon as a follow-up plan to address the patient's pain: continuation of current treatment plan for pain and prescription for opiod analgesics.     ELE REPORT  As part of the patient's treatment plan, I am prescribing controlled substances. The patient has been made aware of appropriate use of such medications, including potential risk of somnolence, limited ability to drive and/or work safely, and the potential for dependence or overdose. It has also been made clear that these medications are for use by this patient only, without concomitant use of alcohol or other substances unless prescribed.     Patient has completed prescribing agreement detailing terms of continued prescribing of controlled substances, including monitoring ELE reports, urine drug screening, and pill counts if necessary. The patient is aware that inappropriate use will results in cessation of prescribing such medications.    As the clinician, I personally reviewed the ELE from 10/30/23 while the patient was in the office today.    History and physical exam exhibit continued safe and appropriate use of controlled substances.    Dictated utilizing Dragon dictation.

## 2023-11-13 ENCOUNTER — OFFICE VISIT (OUTPATIENT)
Dept: INTERNAL MEDICINE | Facility: CLINIC | Age: 88
End: 2023-11-13
Payer: MEDICARE

## 2023-11-13 VITALS
OXYGEN SATURATION: 98 % | WEIGHT: 166 LBS | HEIGHT: 64 IN | DIASTOLIC BLOOD PRESSURE: 70 MMHG | BODY MASS INDEX: 28.34 KG/M2 | SYSTOLIC BLOOD PRESSURE: 128 MMHG | HEART RATE: 90 BPM

## 2023-11-13 DIAGNOSIS — R53.83 OTHER FATIGUE: ICD-10-CM

## 2023-11-13 DIAGNOSIS — I87.303 STASIS EDEMA OF BOTH LOWER EXTREMITIES: Primary | Chronic | ICD-10-CM

## 2023-11-13 DIAGNOSIS — E78.5 HYPERLIPIDEMIA LDL GOAL <130: Chronic | ICD-10-CM

## 2023-11-13 DIAGNOSIS — G89.29 CHRONIC LEFT-SIDED LOW BACK PAIN WITH LEFT-SIDED SCIATICA: Chronic | ICD-10-CM

## 2023-11-13 DIAGNOSIS — M54.42 CHRONIC LEFT-SIDED LOW BACK PAIN WITH LEFT-SIDED SCIATICA: Chronic | ICD-10-CM

## 2023-11-13 NOTE — ASSESSMENT & PLAN NOTE
Lipid abnormalities are improving with treatment.  Pharmacotherapy as ordered.  Lipids will be reassessed in 6 months.    Lab Results   Component Value Date    CHOL 175 05/03/2017    CHLPL 171 05/17/2023    TRIG 89 05/17/2023    HDL 63 (H) 05/17/2023    LDL 92 05/17/2023

## 2023-11-13 NOTE — PROGRESS NOTES
Chief Complaint  Hyperlipidemia     Subjective:      History of Present Illness {CC  Problem List  Visit  Diagnosis   Encounters  Notes  Medications  Labs  Result Review Imaging  Media :23}     Shanique Martinez presents to [facility] for:      Hyperlipidemia: continues to do well on lipitor.  No issues.     Stasis edema: continues lasix/ compression garments.     Back pain: she is now being followed by pain mgt.   LV on 10/30/23  continues hydrocodone and ronel   Patient saw Dr. Ludwig St on 8/2/2022 - recommended patient avoid surgery   States still has some fatigue.  Takes her longer to clean the house.  No CP, SOA.     Due for PNA vaccine today.     I have reviewed patient's medical history, any new submitted information provided by patient or medical assistant and updated medical record.      Objective:      Physical Exam  Vitals reviewed.   Constitutional:       Appearance: Normal appearance. She is well-developed.   Neck:      Thyroid: No thyromegaly.   Cardiovascular:      Rate and Rhythm: Normal rate and regular rhythm.      Pulses: Normal pulses.      Heart sounds: Normal heart sounds.   Pulmonary:      Effort: Pulmonary effort is normal.      Breath sounds: Normal breath sounds.      Comments: E/U   Abdominal:      General: Bowel sounds are normal.   Lymphadenopathy:      Cervical: No cervical adenopathy.   Skin:     General: Skin is warm and dry.   Neurological:      Mental Status: She is alert and oriented to person, place, and time.   Psychiatric:         Behavior: Behavior is cooperative.        Result Review  Data Reviewed:{ Labs  Result Review  Imaging  Med Tab  Media :23}     The following data was reviewed by: Alvarez Ron III, NP-C on 11/13/2023  Common labs          5/17/2023    11:56   Common Labs   Glucose 88    BUN 16    Creatinine 0.72    Sodium 141    Potassium 3.9    Chloride 105    Calcium 10.2    Total Protein 6.8   "  Albumin 4.3    Total Bilirubin 0.5    Alkaline Phosphatase 122    AST (SGOT) 21    ALT (SGPT) 15    WBC 7.31    Hemoglobin 12.4    Hematocrit 37.4    Platelets 371    Total Cholesterol 171    Triglycerides 89    HDL Cholesterol 63    LDL Cholesterol  92             Vital Signs:   /70 (BP Location: Left arm, Patient Position: Sitting, Cuff Size: Adult)   Pulse 90   Ht 162.6 cm (64\")   Wt 75.3 kg (166 lb)   SpO2 98%   BMI 28.49 kg/m²         Requested Prescriptions      No prescriptions requested or ordered in this encounter       Routine medications provided by this office will also be refilled via pharmacy request.       Current Outpatient Medications:     atorvastatin (LIPITOR) 10 MG tablet, TAKE ONE TABLET BY MOUTH DAILY, Disp: 90 tablet, Rfl: 4    CALCIUM PO, Take 1 tablet by mouth Daily., Disp: , Rfl:     cyclobenzaprine (FLEXERIL) 10 MG tablet, Take 1 tablet by mouth 3 (Three) Times a Day., Disp: 90 tablet, Rfl: 1    etodolac (LODINE) 200 MG capsule, Take 1 capsule by mouth 3 (Three) Times a Day., Disp: 90 capsule, Rfl: 1    furosemide (LASIX) 20 MG tablet, TAKE ONE TABLET BY MOUTH EVERY MORNING, Disp: 90 tablet, Rfl: 1    gabapentin (NEURONTIN) 100 MG capsule, Take 1 capsule by mouth 3 (Three) Times a Day., Disp: 90 capsule, Rfl: 0    HYDROcodone-acetaminophen (NORCO) 5-325 MG per tablet, Take 1 tablet by mouth 2 (Two) Times a Day As Needed (Pain). 30 day supply, Disp: 60 tablet, Rfl: 0    potassium chloride (K-DUR,KLOR-CON) 10 MEQ CR tablet, TAKE ONE TABLET BY MOUTH DAILY, Disp: 90 tablet, Rfl: 1    prednisoLONE acetate (PRED FORTE) 1 % ophthalmic suspension, , Disp: , Rfl:     naloxone (NARCAN) 4 MG/0.1ML nasal spray, 1 spray into the nostril(s) as directed by provider As Needed (for accidental overdose or respiratory depression). (Patient not taking: Reported on 11/13/2023), Disp: 2 each, Rfl: 0     Assessment and Plan:      Assessment and Plan {CC Problem List  Visit Diagnosis  ROS  Review " (Popup)  Health Maintenance  Quality  BestPractice  Medications  SmartSets  SnapShot Encounters  Media :23}     Problem List Items Addressed This Visit          Cardiac and Vasculature    Hyperlipidemia LDL goal <130 (Chronic)    Overview     Current medication: lipitor 10 mg daily          Current Assessment & Plan     Lipid abnormalities are improving with treatment.  Pharmacotherapy as ordered.  Lipids will be reassessed in 6 months.    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           Other Visit Diagnoses       Other fatigue        Relevant Orders    TSH Rfx On Abnormal To Free T4            Follow Up {Instructions Charge Capture  Follow-up Communications :23}     Return in about 6 months (around 5/13/2024) for Medicare Wellness.      Patient was given instructions and counseling regarding her condition or for health maintenance advice. Please see specific information pulled into the AVS if appropriate.    Dragon disclaimer:   Much of this encounter note is an electronic transcription/translation of spoken language to printed text. The electronic translation of spoken language may permit erroneous, or at times, nonsensical words or phrases to be inadvertently transcribed; Although I have reviewed the note for such errors, some may still exist.     Additional Patient Counseling:       There are no Patient Instructions on file for this visit.

## 2023-11-14 LAB
BUN SERPL-MCNC: 19 MG/DL (ref 8–23)
BUN/CREAT SERPL: 18.1 (ref 7–25)
CALCIUM SERPL-MCNC: 10 MG/DL (ref 8.6–10.5)
CHLORIDE SERPL-SCNC: 103 MMOL/L (ref 98–107)
CO2 SERPL-SCNC: 27.6 MMOL/L (ref 22–29)
CREAT SERPL-MCNC: 1.05 MG/DL (ref 0.57–1)
EGFRCR SERPLBLD CKD-EPI 2021: 51.9 ML/MIN/1.73
GLUCOSE SERPL-MCNC: 84 MG/DL (ref 65–99)
POTASSIUM SERPL-SCNC: 4.2 MMOL/L (ref 3.5–5.2)
SODIUM SERPL-SCNC: 141 MMOL/L (ref 136–145)
T4 FREE SERPL-MCNC: 0.95 NG/DL (ref 0.93–1.7)
TSH SERPL DL<=0.005 MIU/L-ACNC: 5.36 UIU/ML (ref 0.27–4.2)

## 2023-11-20 DIAGNOSIS — M54.42 CHRONIC LEFT-SIDED LOW BACK PAIN WITH LEFT-SIDED SCIATICA: ICD-10-CM

## 2023-11-20 DIAGNOSIS — G89.29 CHRONIC LEFT-SIDED LOW BACK PAIN WITH LEFT-SIDED SCIATICA: ICD-10-CM

## 2023-11-21 RX ORDER — ETODOLAC 200 MG/1
200 CAPSULE ORAL 3 TIMES DAILY
Qty: 90 CAPSULE | Refills: 1 | Status: SHIPPED | OUTPATIENT
Start: 2023-11-21

## 2023-12-04 ENCOUNTER — TELEPHONE (OUTPATIENT)
Dept: PAIN MEDICINE | Facility: CLINIC | Age: 88
End: 2023-12-04

## 2023-12-04 DIAGNOSIS — M54.16 LEFT LUMBAR RADICULOPATHY: ICD-10-CM

## 2023-12-04 RX ORDER — GABAPENTIN 100 MG/1
100 CAPSULE ORAL 3 TIMES DAILY
Qty: 90 CAPSULE | Refills: 2 | Status: SHIPPED | OUTPATIENT
Start: 2023-12-04

## 2023-12-04 NOTE — TELEPHONE ENCOUNTER
Caller: Shanique Martinez    Relationship: Self    Best call back number: [phone]    Requested Prescriptions: NORCO 5-325 AND GABAPENTIN 100 MG  Requested Prescriptions      No prescriptions requested or ordered in this encounter        Pharmacy where request should be sent:  [facility] [phone]    Last office visit with prescribing clinician: 10/30/2023     Next office visit with prescribing clinician: 12/18/2023     Additional details provided by patient: PATIENT HAS 4 PILLS LEFT OF THE NORCO    Does the patient have less than a 3 day supply:  [x] Yes  [] No

## 2023-12-06 DIAGNOSIS — M47.816 LUMBAR FACET ARTHROPATHY: ICD-10-CM

## 2023-12-06 NOTE — TELEPHONE ENCOUNTER
Medication Refill Request    Date of phone call: 23    Medication being requested: Norco 5-325 mg  si tab po bid prn  Qty: 60    Date of last visit: 10/30/23    Date of last refill:     ELE up to date?:     Next Follow up?: 23    Any new pertinent information? (i.e, new medication allergies, new use of medications, change in patient's health or condition, non-compliance or inconsistency with prescribing agreement?):

## 2023-12-07 RX ORDER — HYDROCODONE BITARTRATE AND ACETAMINOPHEN 5; 325 MG/1; MG/1
1 TABLET ORAL 2 TIMES DAILY PRN
Qty: 60 TABLET | Refills: 0 | Status: SHIPPED | OUTPATIENT
Start: 2023-12-07

## 2023-12-18 ENCOUNTER — OFFICE VISIT (OUTPATIENT)
Dept: PAIN MEDICINE | Facility: CLINIC | Age: 88
End: 2023-12-18
Payer: MEDICARE

## 2023-12-18 VITALS
HEART RATE: 82 BPM | OXYGEN SATURATION: 98 % | TEMPERATURE: 96.9 F | HEIGHT: 64 IN | BODY MASS INDEX: 27.9 KG/M2 | WEIGHT: 163.4 LBS | DIASTOLIC BLOOD PRESSURE: 64 MMHG | RESPIRATION RATE: 20 BRPM | SYSTOLIC BLOOD PRESSURE: 142 MMHG

## 2023-12-18 DIAGNOSIS — M47.816 LUMBAR FACET ARTHROPATHY: Primary | ICD-10-CM

## 2023-12-18 DIAGNOSIS — G89.4 CHRONIC PAIN SYNDROME: ICD-10-CM

## 2023-12-18 DIAGNOSIS — M48.061 NEURAL FORAMINAL STENOSIS OF LUMBAR SPINE: ICD-10-CM

## 2023-12-18 DIAGNOSIS — Z79.899 ENCOUNTER FOR LONG-TERM (CURRENT) DRUG USE: ICD-10-CM

## 2023-12-18 DIAGNOSIS — M54.16 LEFT LUMBAR RADICULOPATHY: ICD-10-CM

## 2023-12-18 DIAGNOSIS — M54.42 CHRONIC LEFT-SIDED LOW BACK PAIN WITH LEFT-SIDED SCIATICA: ICD-10-CM

## 2023-12-18 DIAGNOSIS — G89.29 CHRONIC LEFT-SIDED LOW BACK PAIN WITH LEFT-SIDED SCIATICA: ICD-10-CM

## 2023-12-18 PROCEDURE — 1125F AMNT PAIN NOTED PAIN PRSNT: CPT

## 2023-12-18 PROCEDURE — 1160F RVW MEDS BY RX/DR IN RCRD: CPT

## 2023-12-18 PROCEDURE — 1159F MED LIST DOCD IN RCRD: CPT

## 2023-12-18 PROCEDURE — 99213 OFFICE O/P EST LOW 20 MIN: CPT

## 2023-12-18 NOTE — PROGRESS NOTES
CHIEF COMPLAINT  BACK PAIN     Subjective   Shanique Martinez is a 87 y.o. female  who presents for follow-up.  She has a history of chronic low back pain. She reports that her pain has remained consistent since her last office visit and varies based on activity level.      Today pain is 2/10VAS in severity. Pain is located in her low back, mostly on the left side. She denies radicular pain. Describes this pain as an intermittent ache. Pain is worsened by increased physical activity, household chores  that require twisting or prolonged standing, and walking long distances. Pain improves with rest/reposition, use of a TENS unit, and medications.      She continues with Hydrocodone 5mg 1-2/day, Gabapentin 100mg TID (reports increased has been helpful), Etodolac 200mg TID, Tylenol Arthritis PRN and Flexeril 10mg 2-3/day. Denies any side effects from the regimen, including constipation and somnolence. Denies any bowel or bladder changes.      Patient saw Dr. Ludwig St on 8/2/2022 - recommended patient avoid surgery     She gets bilateral knee gel injections with Dr. Clark every 6 months. She received these injections on 7/17/23.      Procedures:  6/15/23 - Left L3-L5 RFA - 50% ongoing relief to back pain  5/25/23 - Left L3-L5 MBB - 80% relief x 2 days  3/23/23 - Left L3-L5 MBB -  80% relief x 4 days  1/5/23 - L4/L5 LESI - 80% ongoing relief  10/27/22 - Left L4 TF LESI - 95% relief x 2 week    Back Pain  This is a chronic problem. The problem occurs intermittently. The problem has been waxing and waning since onset. The pain is present in the lumbar spine (L side > R side). The quality of the pain is described as aching. The pain radiates to the left thigh (left lateral/posterior thigh). The pain is at a severity of 2/10. The symptoms are aggravated by standing and position (increased physical activity, prolonged standing, walking long distances). Associated symptoms include weakness. Pertinent negatives include no  abdominal pain, chest pain, dysuria, fever, headaches or numbness. Risk factors include sedentary lifestyle. She has tried muscle relaxant and NSAIDs (rest/reposition, PT in the past, TENS unit, Tylenol, Lodine, Flexeril) for the symptoms.      PEG Assessment   What number best describes your pain on average in the past week?2  What number best describes how, during the past week, pain has interfered with your enjoyment of life?4  What number best describes how, during the past week, pain has interfered with your general activity?  4    Review of Pertinent Medical Data ---  FACILITY:  [facility]   UNIT/AGE/GENDER: ND.MRI  OP      AGE:86 Y          SEX:F   PATIENT NAME/:  CATHLEEN GALE S    1935   UNIT NUMBER:  [id number]   ACCOUNT NUMBER:  [id number]   ACCESSION NUMBER:  [id number]     MRI lumbar spine without contrast,     2022 at 0837 hours     HISTORY: Low back pain.         TECHNIQUE: Multiplanar multisequence imaging was obtained of the lumbar spine on a 3 Ruby magnet. Images were obtained without IV gadolinium.     COMPARISON: No     FINDINGS: The conus terminates at L1. The signal within the conus is within normal limits. The cauda equina is unremarkable.     There is moderate dextroscoliosis with the apex at L3. There is left lateral translation of L2 on L3. There is 3 mm anterolisthesis of L4 and L5 secondary to facet disease. There is mild-to-moderate multilevel degenerative disc disease. No fracture or   pars defect is demonstrated. The paravertebral soft tissues are unremarkable.     At T11-T12 broad-based disc bulge and facet disease resulting in moderate right neuroforaminal narrowing     T12-L1: A broad-based disc bulge and facet disease resulting in mild-to-moderate right neuroforaminal narrowing     L1-L2: A broad-based disc bulge, posterior osseous lipping and facet disease resulting in moderate left neuroforaminal narrowing.     L2-L3: A broad-based  disc bulge, posterior osteophytes with facet disease resulting in moderate left neuroforaminal narrowing.     L3-L4: A broad-based disc bulge and facet disease resulting in moderate left neuroforaminal narrowing.     L4-L5: There is 3 mm anterolisthesis of L4 and L5 secondary to severe facet disease. There is uncovering of the disc. There is severe left-sided neuroforaminal narrowing and mild spinal stenosis.     L5-S1: A broad-based disc bulge and facet disease result in mild bilateral neuroforaminal narrowing     IMPRESSION:     1. Moderate dextroscoliosis with the apex at L3. There is left lateral translation of L2 on L3.   2. There is 3 mm anterolisthesis of L4 and L5 secondary to facet disease. Uncovering of the disc is resulting in severe left neuroforaminal narrowing and mild spinal stenosis.   3. Mild-to-moderate degenerative spondylosis.   4. At L1-L2 and L2-L3 degenerative spondylosis resulting in moderate left neuroforaminal narrowing     Dictated by: Patricia Mcgee M.D.     Images and Report reviewed and interpreted by: Patricia Mcgee M.D.     <PS><Electronically signed by: Patricia Mcgee M.D.>  04/23/2022 1230     D: 04/23/2022 1225   T: 04/23/2022 1225     The following portions of the patient's history were reviewed and updated as appropriate: allergies, current medications, past family history, past medical history, past social history, past surgical history, and problem list.    Review of Systems   Constitutional:  Positive for fatigue. Negative for activity change and fever.   Cardiovascular:  Negative for chest pain.   Gastrointestinal:  Negative for abdominal pain, constipation and diarrhea.   Genitourinary:  Negative for difficulty urinating and dysuria.   Musculoskeletal:  Positive for back pain.   Neurological:  Positive for weakness. Negative for dizziness, light-headedness, numbness and headaches.   Psychiatric/Behavioral:  Negative for agitation, sleep disturbance and suicidal  "ideas. The patient is not nervous/anxious.      I have reviewed and confirmed the accuracy of the ROS as documented by the MA/LPN/RN LALITHA Best    Vitals:    12/18/23 1350   BP: 142/64   BP Location: Left arm   Patient Position: Sitting   Cuff Size: Large Adult   Pulse: 82   Resp: 20   Temp: 96.9 °F (36.1 °C)   SpO2: 98%   Weight: 74.1 kg (163 lb 6.4 oz)   Height: 162.6 cm (64\")   PainSc:   2   PainLoc: Back     Objective   Physical Exam  Constitutional:       Appearance: Normal appearance.   HENT:      Head: Normocephalic.   Cardiovascular:      Rate and Rhythm: Normal rate and regular rhythm.   Pulmonary:      Effort: Pulmonary effort is normal.      Breath sounds: Normal breath sounds.   Musculoskeletal:      Cervical back: Tenderness (left side) present. Pain with movement and muscular tenderness present. Decreased range of motion.      Lumbar back: No tenderness or bony tenderness. Positive right straight leg raise test and positive left straight leg raise test.      Right lower leg: Edema present.      Left lower leg: Edema present.      Comments: + lumbar facet loading/tenderness   Skin:     General: Skin is warm and dry.      Capillary Refill: Capillary refill takes less than 2 seconds.   Neurological:      General: No focal deficit present.      Mental Status: She is alert and oriented to person, place, and time.      Gait: Gait abnormal (slowed, ambulates with walker).   Psychiatric:         Mood and Affect: Mood normal.         Behavior: Behavior normal.         Thought Content: Thought content normal.         Cognition and Memory: Cognition normal.       Assessment & Plan   Diagnoses and all orders for this visit:    1. Lumbar facet arthropathy (Primary)    2. Left lumbar radiculopathy    3. Chronic pain syndrome    4. Neural foraminal stenosis of lumbar spine    5. Encounter for long-term (current) drug use      Shanique Martinez reports a pain score of 2.  Given her pain assessment as noted, " treatment options were discussed and the following options were decided upon as a follow-up plan to address the patient's pain: continuation of current treatment plan for pain and prescription for opiod analgesics.    --- The urine drug screen confirmation from 10/30/23 has been reviewed and the result is 10/30/23 based on patient history and ELE report  --- The patient signed an updated copy of the controlled substance agreement on 5/2/23  --- Continue Gabapentin. No refills needed at this time.   --- Continue Hydrocodone. No refill needed at this time. Patient appears stable with current regimen. No adverse effects. Regarding continuation of opioids, there is no evidence of aberrant behavior or any red flags.  The patient continues with appropriate response to opioid therapy. ADL's remain intact by self.   --- Repeat Left L3-L5 RFA when needed  --- Follow-up 2 months or sooner if needed     ELE REPORT  As part of the patient's treatment plan, I am prescribing controlled substances. The patient has been made aware of appropriate use of such medications, including potential risk of somnolence, limited ability to drive and/or work safely, and the potential for dependence or overdose. It has also been made clear that these medications are for use by this patient only, without concomitant use of alcohol or other substances unless prescribed.     Patient has completed prescribing agreement detailing terms of continued prescribing of controlled substances, including monitoring ELE reports, urine drug screening, and pill counts if necessary. The patient is aware that inappropriate use will results in cessation of prescribing such medications.    As the clinician, I personally reviewed the ELE from 12/18/23 while the patient was in the office today.    History and physical exam exhibit continued safe and appropriate use of controlled substances.    Dictated utilizing Dragon dictation.

## 2023-12-19 RX ORDER — CYCLOBENZAPRINE HCL 10 MG
10 TABLET ORAL 3 TIMES DAILY
Qty: 90 TABLET | Refills: 1 | Status: SHIPPED | OUTPATIENT
Start: 2023-12-19

## 2023-12-19 NOTE — TELEPHONE ENCOUNTER
Rx Refill Note  Requested Prescriptions     Pending Prescriptions Disp Refills    cyclobenzaprine (FLEXERIL) 10 MG tablet [Pharmacy Med Name: CYCLOBENZAPRINE 10 MG TABLET] 90 tablet 1     Sig: TAKE ONE TABLET BY MOUTH THREE TIMES A DAY      Last office visit with prescribing clinician: 11/13/2023   Next office visit with prescribing clinician: 5/13/2024         Sonja Goff MA  12/19/23, 09:23 EST

## 2024-01-15 DIAGNOSIS — G89.29 CHRONIC LEFT-SIDED LOW BACK PAIN WITH LEFT-SIDED SCIATICA: ICD-10-CM

## 2024-01-15 DIAGNOSIS — M47.816 LUMBAR FACET ARTHROPATHY: ICD-10-CM

## 2024-01-15 DIAGNOSIS — M54.42 CHRONIC LEFT-SIDED LOW BACK PAIN WITH LEFT-SIDED SCIATICA: ICD-10-CM

## 2024-01-15 RX ORDER — HYDROCODONE BITARTRATE AND ACETAMINOPHEN 5; 325 MG/1; MG/1
1 TABLET ORAL 2 TIMES DAILY PRN
Qty: 60 TABLET | Refills: 0 | Status: SHIPPED | OUTPATIENT
Start: 2024-01-15

## 2024-01-15 RX ORDER — ETODOLAC 200 MG/1
200 CAPSULE ORAL 3 TIMES DAILY
Qty: 90 CAPSULE | Refills: 1 | Status: SHIPPED | OUTPATIENT
Start: 2024-01-15

## 2024-01-15 NOTE — TELEPHONE ENCOUNTER
Rx Refill Note  Requested Prescriptions     Pending Prescriptions Disp Refills    etodolac (LODINE) 200 MG capsule [Pharmacy Med Name: ETODOLAC 200 MG CAPSULE] 90 capsule 1     Sig: TAKE ONE CAPSULE BY MOUTH THREE TIMES A DAY      Last office visit with prescribing clinician: 11/13/2023  Next office visit with prescribing clinician: 5/13/2024         Sonja Goff MA  01/15/24, 09:35 EST

## 2024-01-15 NOTE — TELEPHONE ENCOUNTER
Caller: Shanique Martinez    Relationship: Self    Best call back number: [phone] (home)     Requested Prescriptions:   Requested Prescriptions     Pending Prescriptions Disp Refills    HYDROcodone-acetaminophen (NORCO) 5-325 MG per tablet 60 tablet 0     Sig: Take 1 tablet by mouth 2 (Two) Times a Day As Needed (Pain). 30 day supply        Pharmacy where request should be sent: [facility] [phone] [address] [phone] [facility] [phone] FX     Last office visit with prescribing clinician: 12/18/2023   Last telemedicine visit with prescribing clinician: Visit date not found   Next office visit with prescribing clinician: 2/19/2024     Additional details provided by patient: PATIENT HAS 5 PILLS LEFT, TAKING 2 PER DAY. ASKING FOR A REFILL     Does the patient have less than a 3 day supply:  [x] Yes  [] No      Shira Nichols Rep   01/15/24 11:14 EST

## 2024-02-19 ENCOUNTER — OFFICE VISIT (OUTPATIENT)
Dept: PAIN MEDICINE | Facility: CLINIC | Age: 89
End: 2024-02-19
Payer: MEDICARE

## 2024-02-19 VITALS
RESPIRATION RATE: 20 BRPM | SYSTOLIC BLOOD PRESSURE: 155 MMHG | HEART RATE: 80 BPM | OXYGEN SATURATION: 100 % | WEIGHT: 157.4 LBS | BODY MASS INDEX: 26.87 KG/M2 | HEIGHT: 64 IN | TEMPERATURE: 97.3 F | DIASTOLIC BLOOD PRESSURE: 78 MMHG

## 2024-02-19 DIAGNOSIS — M54.42 CHRONIC LEFT-SIDED LOW BACK PAIN WITH LEFT-SIDED SCIATICA: ICD-10-CM

## 2024-02-19 DIAGNOSIS — M62.838 MUSCLE SPASM: ICD-10-CM

## 2024-02-19 DIAGNOSIS — M47.816 LUMBAR FACET ARTHROPATHY: ICD-10-CM

## 2024-02-19 DIAGNOSIS — G89.29 CHRONIC LEFT-SIDED LOW BACK PAIN WITH LEFT-SIDED SCIATICA: ICD-10-CM

## 2024-02-19 DIAGNOSIS — M54.16 LEFT LUMBAR RADICULOPATHY: Primary | ICD-10-CM

## 2024-02-19 DIAGNOSIS — M48.061 NEURAL FORAMINAL STENOSIS OF LUMBAR SPINE: ICD-10-CM

## 2024-02-19 DIAGNOSIS — G89.4 CHRONIC PAIN SYNDROME: ICD-10-CM

## 2024-02-19 DIAGNOSIS — Z79.899 ENCOUNTER FOR LONG-TERM (CURRENT) DRUG USE: ICD-10-CM

## 2024-02-19 PROCEDURE — 1160F RVW MEDS BY RX/DR IN RCRD: CPT

## 2024-02-19 PROCEDURE — 1159F MED LIST DOCD IN RCRD: CPT

## 2024-02-19 PROCEDURE — 1125F AMNT PAIN NOTED PAIN PRSNT: CPT

## 2024-02-19 PROCEDURE — 99213 OFFICE O/P EST LOW 20 MIN: CPT

## 2024-02-19 RX ORDER — CYCLOBENZAPRINE HCL 10 MG
10 TABLET ORAL 3 TIMES DAILY
Qty: 90 TABLET | Refills: 1 | Status: SHIPPED | OUTPATIENT
Start: 2024-02-19

## 2024-02-19 RX ORDER — HYDROCODONE BITARTRATE AND ACETAMINOPHEN 5; 325 MG/1; MG/1
1 TABLET ORAL 2 TIMES DAILY PRN
Qty: 60 TABLET | Refills: 0 | Status: SHIPPED | OUTPATIENT
Start: 2024-02-19

## 2024-02-19 NOTE — PROGRESS NOTES
CHIEF COMPLAINT  Back pain    Subjective   Shanique Martinez is a 88 y.o. female  who presents for follow-up.  She has a history of chronic low back pain. She reports that her pain has slightly improved since her last office visit.     Today pain is 1/10VAS in severity (severity in pain varies based on activity level). Pain is located in her low back, mostly on the left side. She denies radicular pain. Describes this pain as an intermittent ache. Pain is worsened by increased physical activity, household chores  that require twisting or prolonged standing, and walking long distances. Pain improves with rest/reposition, use of a TENS unit, and medications.      She continues with Hydrocodone 5mg 1-2/day, Gabapentin 100mg TID, Etodolac 200mg TID, Tylenol Arthritis PRN and Flexeril 10mg 2-3/day. Denies any side effects from the regimen, including constipation and somnolence. Denies any bowel or bladder changes.      Patient saw Dr. Ludwig St on 8/2/2022 - recommended patient avoid surgery     She gets bilateral knee gel injections with Dr. Clark every 6 months. She received these injections on 7/17/23.      Procedures:  6/15/23 - Left L3-L5 RFA - 50% ongoing relief to back pain  5/25/23 - Left L3-L5 MBB - 80% relief x 2 days  3/23/23 - Left L3-L5 MBB -  80% relief x 4 days  1/5/23 - L4/L5 LESI - 80% ongoing relief  10/27/22 - Left L4 TF LESI - 95% relief x 2 week    Back Pain  This is a chronic problem. The problem occurs intermittently. The problem has been rapidly improving since onset. The pain is present in the lumbar spine (L side > R side). The quality of the pain is described as aching. The pain radiates to the left thigh (left lateral/posterior thigh). The pain is at a severity of 1/10 (severity in pain varies based on activity level). The symptoms are aggravated by standing and position (increased physical activity, prolonged standing, walking long distances). Pertinent negatives include no abdominal  "pain, chest pain, dysuria, fever, headaches, numbness or weakness. Risk factors include sedentary lifestyle. She has tried muscle relaxant and NSAIDs (rest/reposition, PT in the past, TENS unit, Tylenol, Lodine, Flexeril) for the symptoms.      PEG Assessment   What number best describes your pain on average in the past week?2  What number best describes how, during the past week, pain has interfered with your enjoyment of life?0  What number best describes how, during the past week, pain has interfered with your general activity?  0    Review of Pertinent Medical Data ---      The following portions of the patient's history were reviewed and updated as appropriate: allergies, current medications, past family history, past medical history, past social history, past surgical history, and problem list.    Review of Systems   Constitutional:  Negative for activity change, fatigue and fever.   HENT:  Negative for congestion.    Respiratory:  Negative for cough and chest tightness.    Cardiovascular:  Negative for chest pain.   Gastrointestinal:  Negative for abdominal pain, constipation and diarrhea.   Genitourinary:  Negative for difficulty urinating and dysuria.   Musculoskeletal:  Positive for back pain.   Neurological:  Negative for dizziness, weakness, light-headedness, numbness and headaches.   Psychiatric/Behavioral:  Negative for agitation, sleep disturbance and suicidal ideas. The patient is not nervous/anxious.      I have reviewed and confirmed the accuracy of the ROS as documented by the MA/LPN/RN LALITHA Best    Vitals:    02/19/24 0936   BP: 155/78   BP Location: Left arm   Patient Position: Sitting   Cuff Size: Adult   Pulse: 80   Resp: 20   Temp: 97.3 °F (36.3 °C)   TempSrc: Temporal   SpO2: 100%   Weight: 71.4 kg (157 lb 6.4 oz)   Height: 162.6 cm (64\")   PainSc:   1     Objective   Physical Exam  Constitutional:       Appearance: Normal appearance.   HENT:      Head: Normocephalic. "   Cardiovascular:      Rate and Rhythm: Normal rate and regular rhythm.   Pulmonary:      Effort: Pulmonary effort is normal.      Breath sounds: Normal breath sounds.   Musculoskeletal:      Cervical back: Tenderness (left side) present. Pain with movement and muscular tenderness present. Decreased range of motion.      Lumbar back: No tenderness or bony tenderness. Positive right straight leg raise test and positive left straight leg raise test.      Right lower leg: Edema present.      Left lower leg: Edema present.      Comments: + lumbar facet loading/tenderness   Skin:     General: Skin is warm and dry.      Capillary Refill: Capillary refill takes less than 2 seconds.   Neurological:      General: No focal deficit present.      Mental Status: She is alert and oriented to person, place, and time.      Gait: Gait abnormal (slowed, ambulates with walker).   Psychiatric:         Mood and Affect: Mood normal.         Behavior: Behavior normal.         Thought Content: Thought content normal.         Cognition and Memory: Cognition normal.       Assessment & Plan   Diagnoses and all orders for this visit:    1. Left lumbar radiculopathy (Primary)    2. Lumbar facet arthropathy  -     HYDROcodone-acetaminophen (NORCO) 5-325 MG per tablet; Take 1 tablet by mouth 2 (Two) Times a Day As Needed (Pain). 30 day supply  Dispense: 60 tablet; Refill: 0    3. Chronic left-sided low back pain with left-sided sciatica    4. Chronic pain syndrome    5. Neural foraminal stenosis of lumbar spine    6. Encounter for long-term (current) drug use    7. Muscle spasm  -     cyclobenzaprine (FLEXERIL) 10 MG tablet; Take 1 tablet by mouth 3 (Three) Times a Day.  Dispense: 90 tablet; Refill: 1      Shanique Martinez reports a pain score of 1.  Given her pain assessment as noted, treatment options were discussed and the following options were decided upon as a follow-up plan to address the patient's pain: continuation of current treatment  plan for pain and prescription for opiod analgesics.    --- The urine drug screen confirmation from 10/30/23 has been reviewed and the result is appropriate based on patient history and ELE report  --- The patient signed an updated copy of the controlled substance agreement on 5/2/23  --- Continue Flexeril. Refill sent to pharmacy.  --- Continue Hydrocodone. Patient appears stable with current regimen. No adverse effects. Regarding continuation of opioids, there is no evidence of aberrant behavior or any red flags.  The patient continues with appropriate response to opioid therapy. ADL's remain intact by self.   --- Repeat Left L3-L5 RFA when needed  --- Follow-up 2 months or sooner if needed     ELE REPORT  As part of the patient's treatment plan, I am prescribing controlled substances. The patient has been made aware of appropriate use of such medications, including potential risk of somnolence, limited ability to drive and/or work safely, and the potential for dependence or overdose. It has also been made clear that these medications are for use by this patient only, without concomitant use of alcohol or other substances unless prescribed.     Patient has completed prescribing agreement detailing terms of continued prescribing of controlled substances, including monitoring ELE reports, urine drug screening, and pill counts if necessary. The patient is aware that inappropriate use will results in cessation of prescribing such medications.    As the clinician, I personally reviewed the ELE from 2/19/24 while the patient was in the office today.     History and physical exam exhibit continued safe and appropriate use of controlled substances.    Dictated utilizing Dragon dictation.

## 2024-03-07 DIAGNOSIS — M54.16 LEFT LUMBAR RADICULOPATHY: ICD-10-CM

## 2024-03-07 RX ORDER — GABAPENTIN 100 MG/1
100 CAPSULE ORAL 3 TIMES DAILY
Qty: 90 CAPSULE | Refills: 1 | Status: SHIPPED | OUTPATIENT
Start: 2024-03-07

## 2024-03-17 DIAGNOSIS — M54.42 CHRONIC LEFT-SIDED LOW BACK PAIN WITH LEFT-SIDED SCIATICA: ICD-10-CM

## 2024-03-17 DIAGNOSIS — G89.29 CHRONIC LEFT-SIDED LOW BACK PAIN WITH LEFT-SIDED SCIATICA: ICD-10-CM

## 2024-03-18 RX ORDER — ETODOLAC 200 MG/1
200 CAPSULE ORAL 3 TIMES DAILY
Qty: 90 CAPSULE | Refills: 1 | Status: SHIPPED | OUTPATIENT
Start: 2024-03-18

## 2024-03-18 NOTE — TELEPHONE ENCOUNTER
Rx Refill Note  Requested Prescriptions     Pending Prescriptions Disp Refills    etodolac (LODINE) 200 MG capsule [Pharmacy Med Name: ETODOLAC 200 MG CAPSULE] 90 capsule 1     Sig: TAKE ONE CAPSULE BY MOUTH THREE TIMES A DAY      Last office visit with prescribing clinician: 11/13/2023  Next office visit with prescribing clinician: 6/12/2024         Sonja Gfof MA  03/18/24, 07:48 EDT

## 2024-04-02 DIAGNOSIS — E78.5 HYPERLIPIDEMIA LDL GOAL <130: ICD-10-CM

## 2024-04-02 DIAGNOSIS — I87.303 STASIS EDEMA OF BOTH LOWER EXTREMITIES: ICD-10-CM

## 2024-04-02 DIAGNOSIS — M47.816 LUMBAR FACET ARTHROPATHY: ICD-10-CM

## 2024-04-02 RX ORDER — ATORVASTATIN CALCIUM 10 MG/1
TABLET, FILM COATED ORAL
Qty: 90 TABLET | Refills: 4 | Status: SHIPPED | OUTPATIENT
Start: 2024-04-02

## 2024-04-02 RX ORDER — HYDROCODONE BITARTRATE AND ACETAMINOPHEN 5; 325 MG/1; MG/1
1 TABLET ORAL 2 TIMES DAILY PRN
Qty: 60 TABLET | Refills: 0 | Status: SHIPPED | OUTPATIENT
Start: 2024-04-02

## 2024-04-02 RX ORDER — FUROSEMIDE 20 MG/1
TABLET ORAL
Qty: 90 TABLET | Refills: 1 | Status: SHIPPED | OUTPATIENT
Start: 2024-04-02

## 2024-04-02 NOTE — TELEPHONE ENCOUNTER
Caller: Shanique Martinez    Relationship: Self    Best call back number: [phone] (home)     Requested Prescriptions:   Requested Prescriptions     Pending Prescriptions Disp Refills    HYDROcodone-acetaminophen (NORCO) 5-325 MG per tablet 60 tablet 0     Sig: Take 1 tablet by mouth 2 (Two) Times a Day As Needed (Pain). 30 day supply        Pharmacy where request should be sent: [facility] [phone] [address] [phone] [facility] [phone] FX     Last office visit with prescribing clinician: 2/19/2024   Last telemedicine visit with prescribing clinician: Visit date not found   Next office visit with prescribing clinician: 4/22/2024     Additional details provided by patient: HAS 3 DAYS LEFT, ASKING FOR REFILL.     Does the patient have less than a 3 day supply:  [] Yes  [x] No    Shira Nichols Rep   04/02/24 13:08 EDT

## 2024-04-16 ENCOUNTER — TRANSCRIBE ORDERS (OUTPATIENT)
Dept: ADMINISTRATIVE | Facility: HOSPITAL | Age: 89
End: 2024-04-16
Payer: MEDICARE

## 2024-04-16 DIAGNOSIS — Z12.31 SCREENING MAMMOGRAM FOR HIGH-RISK PATIENT: Primary | ICD-10-CM

## 2024-04-17 DIAGNOSIS — I87.303 STASIS EDEMA OF BOTH LOWER EXTREMITIES: ICD-10-CM

## 2024-04-17 RX ORDER — POTASSIUM CHLORIDE 750 MG/1
10 TABLET, EXTENDED RELEASE ORAL DAILY
Qty: 90 TABLET | Refills: 1 | Status: SHIPPED | OUTPATIENT
Start: 2024-04-17

## 2024-04-22 ENCOUNTER — OFFICE VISIT (OUTPATIENT)
Dept: PAIN MEDICINE | Facility: CLINIC | Age: 89
End: 2024-04-22
Payer: MEDICARE

## 2024-04-22 VITALS
HEIGHT: 64 IN | BODY MASS INDEX: 27.76 KG/M2 | DIASTOLIC BLOOD PRESSURE: 78 MMHG | TEMPERATURE: 97.3 F | SYSTOLIC BLOOD PRESSURE: 132 MMHG | OXYGEN SATURATION: 100 % | HEART RATE: 74 BPM | WEIGHT: 162.6 LBS | RESPIRATION RATE: 18 BRPM

## 2024-04-22 DIAGNOSIS — M48.061 NEURAL FORAMINAL STENOSIS OF LUMBAR SPINE: ICD-10-CM

## 2024-04-22 DIAGNOSIS — G89.4 CHRONIC PAIN SYNDROME: ICD-10-CM

## 2024-04-22 DIAGNOSIS — M47.816 LUMBAR FACET ARTHROPATHY: Primary | ICD-10-CM

## 2024-04-22 DIAGNOSIS — Z79.899 ENCOUNTER FOR LONG-TERM (CURRENT) DRUG USE: ICD-10-CM

## 2024-04-22 DIAGNOSIS — M54.16 LEFT LUMBAR RADICULOPATHY: ICD-10-CM

## 2024-04-22 DIAGNOSIS — M54.42 CHRONIC LEFT-SIDED LOW BACK PAIN WITH LEFT-SIDED SCIATICA: ICD-10-CM

## 2024-04-22 DIAGNOSIS — G89.29 CHRONIC LEFT-SIDED LOW BACK PAIN WITH LEFT-SIDED SCIATICA: ICD-10-CM

## 2024-04-22 PROCEDURE — 99213 OFFICE O/P EST LOW 20 MIN: CPT

## 2024-04-22 PROCEDURE — 1125F AMNT PAIN NOTED PAIN PRSNT: CPT

## 2024-04-22 PROCEDURE — 1160F RVW MEDS BY RX/DR IN RCRD: CPT

## 2024-04-22 PROCEDURE — 80305 DRUG TEST PRSMV DIR OPT OBS: CPT

## 2024-04-22 PROCEDURE — 1159F MED LIST DOCD IN RCRD: CPT

## 2024-04-22 RX ORDER — HYDROCODONE BITARTRATE AND ACETAMINOPHEN 5; 325 MG/1; MG/1
1 TABLET ORAL 2 TIMES DAILY PRN
Qty: 60 TABLET | Refills: 0 | Status: SHIPPED | OUTPATIENT
Start: 2024-04-22

## 2024-04-22 NOTE — PROGRESS NOTES
CHIEF COMPLAINT  Back pain    Subjective   Shanique Martinez is a 88 y.o. female  who presents for follow-up.  She has a history of chronic low back pain. She reports that her pain has remained consistent since her last office visit and varies based on activity level.       Today pain is 1/10VAS in severity (severity in pain varies based on activity level). Pain is located in her low back, mostly on the left side. She denies radicular pain. Describes this pain as an intermittent ache. Pain is worsened by increased physical activity, household chores  that require twisting or prolonged standing, and walking long distances. Pain improves with rest/reposition, use of a TENS unit, and medications.      She continues with Hydrocodone 5mg 1-2/day, Gabapentin 100mg TID, Etodolac 200mg TID, Tylenol Arthritis PRN and Flexeril 10mg 2-3/day. Denies any side effects from the regimen, including constipation and somnolence. Denies any bowel or bladder changes.      Patient saw Dr. Ludwig St on 8/2/2022 - recommended patient avoid surgery     She gets bilateral knee gel injections with Dr. Clark every 6 months. She received these injections on 7/17/23.      Procedures:  6/15/23 - Left L3-L5 RFA - 50% ongoing relief to back pain  5/25/23 - Left L3-L5 MBB - 80% relief x 2 days  3/23/23 - Left L3-L5 MBB -  80% relief x 4 days  1/5/23 - L4/L5 LESI - 80% ongoing relief  10/27/22 - Left L4 TF LESI - 95% relief x 2 week    Back Pain  This is a chronic problem. The problem occurs intermittently. The problem is unchanged (unchanged since last office visit). The pain is present in the lumbar spine (L side > R side). The quality of the pain is described as aching. The pain radiates to the left thigh (left lateral/posterior thigh). The pain is at a severity of 1/10 (severity in pain varies based on activity level). The symptoms are aggravated by standing and position (increased physical activity, prolonged standing, walking long  "distances). Associated symptoms include weakness (bilateral knees). Pertinent negatives include no abdominal pain, chest pain, dysuria, fever, headaches or numbness. Risk factors include sedentary lifestyle. She has tried muscle relaxant and NSAIDs (rest/reposition, PT in the past, TENS unit, Tylenol, Lodine, Flexeril) for the symptoms.     PEG Assessment   What number best describes your pain on average in the past week?3  What number best describes how, during the past week, pain has interfered with your enjoyment of life?3  What number best describes how, during the past week, pain has interfered with your general activity?  4    Review of Pertinent Medical Data ---      The following portions of the patient's history were reviewed and updated as appropriate: allergies, current medications, past family history, past medical history, past social history, past surgical history, and problem list.    Review of Systems   Constitutional:  Negative for fever.   Cardiovascular:  Negative for chest pain.   Gastrointestinal:  Negative for abdominal pain, constipation and diarrhea.   Genitourinary:  Negative for difficulty urinating and dysuria.   Musculoskeletal:  Positive for back pain.   Neurological:  Positive for weakness (bilateral knees). Negative for numbness and headaches.   Psychiatric/Behavioral:  Negative for sleep disturbance and suicidal ideas. The patient is not nervous/anxious.      I have reviewed and confirmed the accuracy of the ROS as documented by the MA/HERMELINDON/RN LALITHA Best    Vitals:    04/22/24 1026   BP: 132/78   Pulse: 74   Resp: 18   Temp: 97.3 °F (36.3 °C)   SpO2: 100%   Weight: 73.8 kg (162 lb 9.6 oz)   Height: 162.6 cm (64\")   PainSc:   1   PainLoc: Back     Objective   Physical Exam  Constitutional:       Appearance: Normal appearance.   HENT:      Head: Normocephalic.   Cardiovascular:      Rate and Rhythm: Normal rate and regular rhythm.   Pulmonary:      Effort: Pulmonary effort is " normal.      Breath sounds: Normal breath sounds.   Musculoskeletal:      Cervical back: Tenderness (left side) present. Pain with movement and muscular tenderness present. Decreased range of motion.      Lumbar back: Tenderness and bony tenderness present. Decreased range of motion. Negative right straight leg raise test and negative left straight leg raise test.      Right lower leg: Edema present.      Left lower leg: Edema present.      Comments: + lumbar facet loading/tenderness   Skin:     General: Skin is warm and dry.      Capillary Refill: Capillary refill takes less than 2 seconds.   Neurological:      General: No focal deficit present.      Mental Status: She is alert and oriented to person, place, and time.      Gait: Gait abnormal (slowed, ambulates with walker).   Psychiatric:         Mood and Affect: Mood normal.         Behavior: Behavior normal.         Thought Content: Thought content normal.         Cognition and Memory: Cognition normal.       Assessment & Plan   Diagnoses and all orders for this visit:    1. Lumbar facet arthropathy (Primary)  -     HYDROcodone-acetaminophen (NORCO) 5-325 MG per tablet; Take 1 tablet by mouth 2 (Two) Times a Day As Needed (Pain). 30 day supply. DNF 5/3/24  Dispense: 60 tablet; Refill: 0  -     Urine Drug Screen Confirmation - Urine, Clean Catch; Future  -     POC Urine Drug Screen, Triage    2. Left lumbar radiculopathy  -     HYDROcodone-acetaminophen (NORCO) 5-325 MG per tablet; Take 1 tablet by mouth 2 (Two) Times a Day As Needed (Pain). 30 day supply. DNF 5/3/24  Dispense: 60 tablet; Refill: 0  -     Urine Drug Screen Confirmation - Urine, Clean Catch; Future  -     POC Urine Drug Screen, Triage    3. Chronic left-sided low back pain with left-sided sciatica  -     HYDROcodone-acetaminophen (NORCO) 5-325 MG per tablet; Take 1 tablet by mouth 2 (Two) Times a Day As Needed (Pain). 30 day supply. DNF 5/3/24  Dispense: 60 tablet; Refill: 0  -     Urine Drug  Screen Confirmation - Urine, Clean Catch; Future  -     POC Urine Drug Screen, Triage    4. Chronic pain syndrome  -     Urine Drug Screen Confirmation - Urine, Clean Catch; Future  -     POC Urine Drug Screen, Triage    5. Neural foraminal stenosis of lumbar spine  -     Urine Drug Screen Confirmation - Urine, Clean Catch; Future  -     POC Urine Drug Screen, Triage    6. Encounter for long-term (current) drug use  -     Urine Drug Screen Confirmation - Urine, Clean Catch; Future  -     POC Urine Drug Screen, Triage      Shanique Martinez reports a pain score of 1.  Given her pain assessment as noted, treatment options were discussed and the following options were decided upon as a follow-up plan to address the patient's pain: continuation of current treatment plan for pain and prescription for opiod analgesics.    --- Routine UDS in office today as part of monitoring requirements for controlled substances.  The specimen was viewed and the immunoassay result reviewed and is +OPI.  This specimen will be sent to [facility] laboratory for confirmation.     --- The patient signed an updated copy of the controlled substance agreement on 4/22/24   --- Continue Flexeril and Gabapentin. Refill sent to pharmacy.  --- Continue Hydrocodone. DNF 5/3/24. Patient appears stable with current regimen. No adverse effects. Regarding continuation of opioids, there is no evidence of aberrant behavior or any red flags.  The patient continues with appropriate response to opioid therapy. ADL's remain intact by self.   --- Repeat Left L3-L5 RFA when needed  --- Follow-up 2 months or sooner if needed     ELE REPORT  As part of the patient's treatment plan, I am prescribing controlled substances. The patient has been made aware of appropriate use of such medications, including potential risk of somnolence, limited ability to drive and/or work safely, and the potential for dependence or overdose. It has also been made clear that these  medications are for use by this patient only, without concomitant use of alcohol or other substances unless prescribed.     Patient has completed prescribing agreement detailing terms of continued prescribing of controlled substances, including monitoring ELE reports, urine drug screening, and pill counts if necessary. The patient is aware that inappropriate use will results in cessation of prescribing such medications.    As the clinician, I personally reviewed the ELE from 4/22/24 while the patient was in the office today.    History and physical exam exhibit continued safe and appropriate use of controlled substances.    Dictated utilizing Dragon dictation.

## 2024-05-01 DIAGNOSIS — M47.816 LUMBAR FACET ARTHROPATHY: ICD-10-CM

## 2024-05-01 DIAGNOSIS — M54.42 CHRONIC LEFT-SIDED LOW BACK PAIN WITH LEFT-SIDED SCIATICA: ICD-10-CM

## 2024-05-01 DIAGNOSIS — G89.29 CHRONIC LEFT-SIDED LOW BACK PAIN WITH LEFT-SIDED SCIATICA: ICD-10-CM

## 2024-05-01 DIAGNOSIS — M54.16 LEFT LUMBAR RADICULOPATHY: ICD-10-CM

## 2024-05-01 RX ORDER — HYDROCODONE BITARTRATE AND ACETAMINOPHEN 5; 325 MG/1; MG/1
1 TABLET ORAL 2 TIMES DAILY PRN
Qty: 60 TABLET | Refills: 0 | Status: CANCELLED | OUTPATIENT
Start: 2024-05-01

## 2024-05-01 NOTE — TELEPHONE ENCOUNTER
Caller: Shanique Martinez    Relationship: Self    Best call back number: [phone]    Requested Prescriptions:   Requested Prescriptions     Pending Prescriptions Disp Refills    HYDROcodone-acetaminophen (NORCO) 5-325 MG per tablet 60 tablet 0     Sig: Take 1 tablet by mouth 2 (Two) Times a Day As Needed (Pain). 30 day supply. DNF 5/3/24        Pharmacy where request should be sent: [facility] [id number] [address] [phone] [facility] [phone]      Last office visit with prescribing clinician: 4/22/2024   Last telemedicine visit with prescribing clinician: Visit date not found   Next office visit with prescribing clinician: 6/24/2024     Additional details provided by patient: NA     Does the patient have less than a 3 day supply:  [x] Yes  [] No    Would you like a call back once the refill request has been completed: [x] Yes [] No    If the office needs to give you a call back, can they leave a voicemail: [x] Yes [] No    Shira Peraza Rep   05/01/24 10:15 EDT

## 2024-05-01 NOTE — TELEPHONE ENCOUNTER
I spoke with Ms. Juan's pharmacy and they have the Rx there. She can't get the Rx until 5/6/2024 because she picked up her Rx last month on 4/7/2024. I will let her know.

## 2024-05-01 NOTE — TELEPHONE ENCOUNTER
Oanh already sent this with a DNF of 5/3/2024. Please check and make sure that the pharmacy has this.

## 2024-05-03 NOTE — TELEPHONE ENCOUNTER
I left a detailed message on Ms. Martinez's daughter's voicemail letting her know that her mother's pain medication has been sent to the pharmacy already and can be picked up on 5/6/2024.

## 2024-05-15 DIAGNOSIS — M54.16 LEFT LUMBAR RADICULOPATHY: ICD-10-CM

## 2024-05-16 RX ORDER — GABAPENTIN 100 MG/1
100 CAPSULE ORAL 3 TIMES DAILY
Qty: 90 CAPSULE | Refills: 2 | Status: SHIPPED | OUTPATIENT
Start: 2024-05-16

## 2024-05-22 DIAGNOSIS — G89.29 CHRONIC LEFT-SIDED LOW BACK PAIN WITH LEFT-SIDED SCIATICA: ICD-10-CM

## 2024-05-22 DIAGNOSIS — M54.42 CHRONIC LEFT-SIDED LOW BACK PAIN WITH LEFT-SIDED SCIATICA: ICD-10-CM

## 2024-05-23 DIAGNOSIS — M62.838 MUSCLE SPASM: ICD-10-CM

## 2024-05-23 RX ORDER — CYCLOBENZAPRINE HCL 10 MG
10 TABLET ORAL 3 TIMES DAILY
Qty: 90 TABLET | Refills: 1 | Status: SHIPPED | OUTPATIENT
Start: 2024-05-23

## 2024-05-23 RX ORDER — ETODOLAC 200 MG/1
200 CAPSULE ORAL 3 TIMES DAILY
Qty: 90 CAPSULE | Refills: 1 | Status: SHIPPED | OUTPATIENT
Start: 2024-05-23

## 2024-05-23 NOTE — TELEPHONE ENCOUNTER
Rx Refill Note  Requested Prescriptions     Pending Prescriptions Disp Refills    etodolac (LODINE) 200 MG capsule [Pharmacy Med Name: ETODOLAC 200 MG CAPSULE] 90 capsule 1     Sig: TAKE 1 CAPSULE BY MOUTH 3 TIMES A DAY      Last office visit with prescribing clinician: 11/13/2023  Next office visit with prescribing clinician: 6/12/2024         Sonja Goff MA  05/23/24, 11:40 EDT

## 2024-05-23 NOTE — TELEPHONE ENCOUNTER
Rx Refill Note  Requested Prescriptions     Pending Prescriptions Disp Refills    cyclobenzaprine (FLEXERIL) 10 MG tablet 90 tablet 1     Sig: Take 1 tablet by mouth 3 (Three) Times a Day.      Last office visit with prescribing clinician: 4/22/2024   Last telemedicine visit with prescribing clinician: Visit date not found   Next office visit with prescribing clinician: 6/24/2024                         Would you like a call back once the refill request has been completed: [] Yes [] No    If the office needs to give you a call back, can they leave a voicemail: [] Yes [] No    Cosme Hess CMA  05/23/24, 08:31 EDT

## 2024-06-06 DIAGNOSIS — M47.816 LUMBAR FACET ARTHROPATHY: ICD-10-CM

## 2024-06-06 DIAGNOSIS — G89.29 CHRONIC LEFT-SIDED LOW BACK PAIN WITH LEFT-SIDED SCIATICA: ICD-10-CM

## 2024-06-06 DIAGNOSIS — M54.16 LEFT LUMBAR RADICULOPATHY: ICD-10-CM

## 2024-06-06 DIAGNOSIS — M54.42 CHRONIC LEFT-SIDED LOW BACK PAIN WITH LEFT-SIDED SCIATICA: ICD-10-CM

## 2024-06-06 NOTE — TELEPHONE ENCOUNTER
Caller: Shanique Martinez    Relationship: Self    Best call back number: [phone]    Requested Prescriptions:   Requested Prescriptions     Pending Prescriptions Disp Refills    HYDROcodone-acetaminophen (NORCO) 5-325 MG per tablet 60 tablet 0     Sig: Take 1 tablet by mouth 2 (Two) Times a Day As Needed (Pain). 30 day supply. DNF 5/3/24        Pharmacy where request should be sent: [facility] [id number] [address] [phone] [facility] [phone] FX     Last office visit with prescribing clinician: 4/22/2024   Last telemedicine visit with prescribing clinician: Visit date not found   Next office visit with prescribing clinician: 6/24/2024     Additional details provided by patient: NA     Does the patient have less than a 3 day supply:  [] Yes  [x] No    Would you like a call back once the refill request has been completed: [] Yes [] No    If the office needs to give you a call back, can they leave a voicemail: [] Yes [] No    Shira Peraza   06/06/24 14:39 EDT

## 2024-06-07 RX ORDER — HYDROCODONE BITARTRATE AND ACETAMINOPHEN 5; 325 MG/1; MG/1
1 TABLET ORAL 2 TIMES DAILY PRN
Qty: 60 TABLET | Refills: 0 | Status: SHIPPED | OUTPATIENT
Start: 2024-06-07

## 2024-06-07 NOTE — TELEPHONE ENCOUNTER
Reviewed last office visit on 4/22/2024. UDS and ELE reviewed and are appropriate. Due to provider being out of office, will refill appropriately.    Covering for Melva Mehta

## 2024-06-12 ENCOUNTER — OFFICE VISIT (OUTPATIENT)
Dept: INTERNAL MEDICINE | Facility: CLINIC | Age: 89
End: 2024-06-12
Payer: MEDICARE

## 2024-06-12 ENCOUNTER — HOSPITAL ENCOUNTER (OUTPATIENT)
Dept: MAMMOGRAPHY | Facility: HOSPITAL | Age: 89
Discharge: HOME | End: 2024-06-12
Admitting: NURSE PRACTITIONER
Payer: MEDICARE

## 2024-06-12 VITALS
WEIGHT: 165.5 LBS | BODY MASS INDEX: 28.25 KG/M2 | HEART RATE: 69 BPM | OXYGEN SATURATION: 96 % | HEIGHT: 64 IN | DIASTOLIC BLOOD PRESSURE: 72 MMHG | SYSTOLIC BLOOD PRESSURE: 124 MMHG

## 2024-06-12 DIAGNOSIS — I87.303 STASIS EDEMA OF BOTH LOWER EXTREMITIES: Chronic | ICD-10-CM

## 2024-06-12 DIAGNOSIS — G89.29 CHRONIC LEFT-SIDED LOW BACK PAIN WITH LEFT-SIDED SCIATICA: Chronic | ICD-10-CM

## 2024-06-12 DIAGNOSIS — E78.5 HYPERLIPIDEMIA LDL GOAL <130: Chronic | ICD-10-CM

## 2024-06-12 DIAGNOSIS — Z12.31 SCREENING MAMMOGRAM FOR HIGH-RISK PATIENT: ICD-10-CM

## 2024-06-12 DIAGNOSIS — Z00.00 MEDICARE ANNUAL WELLNESS VISIT, SUBSEQUENT: Primary | ICD-10-CM

## 2024-06-12 DIAGNOSIS — M54.42 CHRONIC LEFT-SIDED LOW BACK PAIN WITH LEFT-SIDED SCIATICA: Chronic | ICD-10-CM

## 2024-06-12 DIAGNOSIS — M19.90 ARTHRITIS: Chronic | ICD-10-CM

## 2024-06-12 LAB
ALBUMIN SERPL-MCNC: 4.3 G/DL (ref 3.5–5.2)
ALBUMIN/GLOB SERPL: 1.8 G/DL
ALP SERPL-CCNC: 138 U/L (ref 39–117)
ALT SERPL-CCNC: 15 U/L (ref 1–33)
AST SERPL-CCNC: 24 U/L (ref 1–32)
BILIRUB SERPL-MCNC: 0.5 MG/DL (ref 0–1.2)
BUN SERPL-MCNC: 19 MG/DL (ref 8–23)
BUN/CREAT SERPL: 16.8 (ref 7–25)
CALCIUM SERPL-MCNC: 9.8 MG/DL (ref 8.6–10.5)
CHLORIDE SERPL-SCNC: 106 MMOL/L (ref 98–107)
CHOLEST SERPL-MCNC: 165 MG/DL (ref 0–200)
CO2 SERPL-SCNC: 26.7 MMOL/L (ref 22–29)
CREAT SERPL-MCNC: 1.13 MG/DL (ref 0.57–1)
EGFRCR SERPLBLD CKD-EPI 2021: 47.2 ML/MIN/1.73
ERYTHROCYTE [DISTWIDTH] IN BLOOD BY AUTOMATED COUNT: 11.5 % (ref 12.3–15.4)
GLOBULIN SER CALC-MCNC: 2.4 GM/DL
GLUCOSE SERPL-MCNC: 83 MG/DL (ref 65–99)
HCT VFR BLD AUTO: 34.6 % (ref 34–46.6)
HDLC SERPL-MCNC: 64 MG/DL (ref 40–60)
HGB BLD-MCNC: 11.2 G/DL (ref 12–15.9)
LDLC SERPL CALC-MCNC: 85 MG/DL (ref 0–100)
LDLC/HDLC SERPL: 1.32 {RATIO}
MCH RBC QN AUTO: 29.4 PG (ref 26.6–33)
MCHC RBC AUTO-ENTMCNC: 32.4 G/DL (ref 31.5–35.7)
MCV RBC AUTO: 90.8 FL (ref 79–97)
PLATELET # BLD AUTO: 336 10*3/MM3 (ref 140–450)
POTASSIUM SERPL-SCNC: 4.7 MMOL/L (ref 3.5–5.2)
PROT SERPL-MCNC: 6.7 G/DL (ref 6–8.5)
RBC # BLD AUTO: 3.81 10*6/MM3 (ref 3.77–5.28)
SODIUM SERPL-SCNC: 143 MMOL/L (ref 136–145)
T4 FREE SERPL-MCNC: 0.89 NG/DL (ref 0.93–1.7)
TRIGL SERPL-MCNC: 84 MG/DL (ref 0–150)
TSH SERPL DL<=0.005 MIU/L-ACNC: 5.61 UIU/ML (ref 0.27–4.2)
VLDLC SERPL CALC-MCNC: 16 MG/DL (ref 5–40)
WBC # BLD AUTO: 4.8 10*3/MM3 (ref 3.4–10.8)

## 2024-06-12 PROCEDURE — 1160F RVW MEDS BY RX/DR IN RCRD: CPT | Performed by: NURSE PRACTITIONER

## 2024-06-12 PROCEDURE — 77067 SCR MAMMO BI INCL CAD: CPT

## 2024-06-12 PROCEDURE — 99214 OFFICE O/P EST MOD 30 MIN: CPT | Performed by: NURSE PRACTITIONER

## 2024-06-12 PROCEDURE — 1159F MED LIST DOCD IN RCRD: CPT | Performed by: NURSE PRACTITIONER

## 2024-06-12 PROCEDURE — 1126F AMNT PAIN NOTED NONE PRSNT: CPT | Performed by: NURSE PRACTITIONER

## 2024-06-12 PROCEDURE — G0439 PPPS, SUBSEQ VISIT: HCPCS | Performed by: NURSE PRACTITIONER

## 2024-06-12 PROCEDURE — 77063 BREAST TOMOSYNTHESIS BI: CPT

## 2024-06-12 PROCEDURE — 1170F FXNL STATUS ASSESSED: CPT | Performed by: NURSE PRACTITIONER

## 2024-06-12 NOTE — PATIENT INSTRUCTIONS
Medicare Wellness  Personal Prevention Plan of Service     Date of Office Visit:    Encounter Provider:  Alvarez Ron III, NP-C  Place of Service:  [facility]  Patient Name: Shanique Martinez  :  1935    As part of the Medicare Wellness portion of your visit today, we are providing you with this personalized preventive plan of services (PPPS). This plan is based upon recommendations of the [place] Preventive Services Task Force (USPSTF) and the Advisory Committee on Immunization Practices (ACIP).    This lists the preventive care services that should be considered, and provides dates of when you are due. Items listed as completed are up-to-date and do not require any further intervention.    Health Maintenance   Topic Date Due    RSV Vaccine - Adults (1 - 1-dose 60+ series) Never done    ZOSTER VACCINE (2 of 3) 2013    COVID-19 Vaccine (6 - - season) 2024    DXA SCAN  2024    ANNUAL WELLNESS VISIT  2024    LIPID PANEL  2024    INFLUENZA VACCINE  2024    BMI FOLLOWUP  2025    TDAP/TD VACCINES (2 - Tdap) 2032    Pneumococcal Vaccine 65+  Completed       No orders of the defined types were placed in this encounter.      Return in about 6 months (around 2024).

## 2024-06-12 NOTE — PROGRESS NOTES
The ABCs of the Annual Wellness Visit  Subsequent Medicare Wellness Visit    Subjective    Shanique Martinez is a 88 y.o. female who presents for a Subsequent Medicare Wellness Visit.    The following portions of the patient's history were reviewed and   updated as appropriate: allergies, current medications, past family history, past medical history, past social history, past surgical history, and problem list.    Wt Readings from Last 4 Encounters:   06/12/24 75.1 kg (165 lb 8 oz)   04/22/24 73.8 kg (162 lb 9.6 oz)   02/19/24 71.4 kg (157 lb 6.4 oz)   12/18/23 74.1 kg (163 lb 6.4 oz)       Weight trend is stable.    Her cardiovascular risks are:    [] No Known risk factors  [] Known CAD and being treated     [] Hypertension    [] Hyperlipidemia  [] Diabetes     [] Obesity  [] Family history    [] Current or hx tobacco use  [] Sedentary lifestyle       Male:   [] Testosterone use     Mobility    [] Ambulates independently  [] Ambulates with cane   [] Ambulates with quad cane  [x] Ambulates with rollator   [] Ambulates with walker   [] Mobile with wheelchair   [] Mobile with electric wheelchair     Continence    [x] Continent of bowel and bladder  [] Incontinent bowel and bladder   [] Incontinent bladder   [] Incontinent bowel      She will get mammogram today.     Social   Single story    Has falls alert device   Recently got new glasses.     Compared to one year ago, the patient feels her physical   health is the same.    Compared to one year ago, the patient feels her mental   health is the same.    Recent Hospitalizations:  She was not admitted to the hospital during the last year.       Current Medical Providers:  Patient Care Team:  Alvarez Ron III, NP-C as PCP - General (Family Medicine)  Sigrid Clark MD as Consulting Physician (Orthopedic Surgery)  Kanika Montoya PA (Physician Assistant)    Outpatient Medications Prior to Visit   Medication Sig Dispense Refill    atorvastatin (LIPITOR) 10  MG tablet TAKE ONE TABLET BY MOUTH DAILY 90 tablet 4    CALCIUM PO Take 1 tablet by mouth Daily.      cyclobenzaprine (FLEXERIL) 10 MG tablet Take 1 tablet by mouth 3 (Three) Times a Day. 90 tablet 1    etodolac (LODINE) 200 MG capsule TAKE 1 CAPSULE BY MOUTH 3 TIMES A DAY 90 capsule 1    furosemide (LASIX) 20 MG tablet TAKE ONE TABLET BY MOUTH EVERY MORNING 90 tablet 1    gabapentin (NEURONTIN) 100 MG capsule TAKE 1 CAPSULE BY MOUTH 3 TIMES A DAY 90 capsule 2    HYDROcodone-acetaminophen (NORCO) 5-325 MG per tablet Take 1 tablet by mouth 2 (Two) Times a Day As Needed (Pain). 60 tablet 0    potassium chloride (KLOR-CON M10) 10 MEQ CR tablet TAKE 1 TABLET BY MOUTH DAILY 90 tablet 1    prednisoLONE acetate (PRED FORTE) 1 % ophthalmic suspension        No facility-administered medications prior to visit.       Opioid medication/s are on active medication list.  and I have evaluated her active treatment plan and pain score trends (see table).  Vitals:    06/12/24 0903   PainSc: 0-No pain     I have reviewed the chart for potential of high risk medication and harmful drug interactions in the elderly.          Aspirin is not on active medication list.  Aspirin use is not indicated based on review of current medical condition/s. Risk of harm outweighs potential benefits.  .    Patient Active Problem List   Diagnosis    Hyperlipidemia LDL goal <130    SI joint arthritis    Arthritis    Encounter for long-term (current) drug use    H/O hysterectomy for benign disease    Menopause    Dizziness    Chronic left-sided low back pain with left-sided sciatica    Stasis edema of both lower extremities    Left lumbar radiculopathy    Lumbar facet arthropathy     Advance Care Planning  Advance Directive is on file.  ACP discussion was held with the patient during this visit. Patient has an advance directive in EMR which is still valid.      Objective    Vitals:    06/12/24 0903   BP: 124/72   BP Location: Left arm   Patient Position:  "Sitting   Cuff Size: Adult   Pulse: 69   SpO2: 96%   Weight: 75.1 kg (165 lb 8 oz)   Height: 162.6 cm (64\")   PainSc: 0-No pain     Estimated body mass index is 28.41 kg/m² as calculated from the following:    Height as of this encounter: 162.6 cm (64\").    Weight as of this encounter: 75.1 kg (165 lb 8 oz).    BMI is >= 25 and <30. (Overweight) The following options were offered after discussion;: exercise counseling/recommendations      Does the patient have evidence of cognitive impairment? No          HEALTH RISK ASSESSMENT    Smoking Status:  Social History     Tobacco Use   Smoking Status Never   Smokeless Tobacco Never     Alcohol Consumption:  Social History     Substance and Sexual Activity   Alcohol Use No     Fall Risk Screen:    STEADI Fall Risk Assessment was completed, and patient is at LOW risk for falls.Assessment completed on:2024    Depression Screenin/12/2024     9:04 AM   PHQ-2/PHQ-9 Depression Screening   Little Interest or Pleasure in Doing Things 0-->not at all   Feeling Down, Depressed or Hopeless 0-->not at all   PHQ-9: Brief Depression Severity Measure Score 0       Health Habits and Functional and Cognitive Screenin/12/2024     9:04 AM   Functional & Cognitive Status   Do you have difficulty preparing food and eating? No   Do you have difficulty bathing yourself, getting dressed or grooming yourself? No   Do you have difficulty using the toilet? No   Do you have difficulty moving around from place to place? No   Do you have trouble with steps or getting out of a bed or a chair? No   Current Diet Well Balanced Diet   Dental Exam Up to date   Eye Exam Up to date   Exercise (times per week) 4 times per week   Current Exercises Include Walking   Do you need help using the phone?  No   Are you deaf or do you have serious difficulty hearing?  No   Do you need help to go to places out of walking distance? No   Do you need help shopping? No   Do you need help preparing " meals?  No   Do you need help with housework?  Yes   Do you need help with laundry? Yes   Do you need help taking your medications? No   Do you need help managing money? No   Do you ever drive or ride in a car without wearing a seat belt? No   Have you felt unusual stress, anger or loneliness in the last month? No   Who do you live with? Alone   If you need help, do you have trouble finding someone available to you? Yes   Have you been bothered in the last four weeks by sexual problems? No   Do you have difficulty concentrating, remembering or making decisions? Yes       Age-appropriate Screening Schedule:  Refer to the list below for future screening recommendations based on patient's age, sex and/or medical conditions. Orders for these recommended tests are listed in the plan section. The patient has been provided with a written plan.    Health Maintenance   Topic Date Due    RSV Vaccine - Adults (1 - 1-dose 60+ series) Never done    ZOSTER VACCINE (2 of 3) 04/03/2013    COVID-19 Vaccine (6 - 2023-24 season) 01/23/2024    DXA SCAN  02/09/2024    ANNUAL WELLNESS VISIT  05/17/2024    LIPID PANEL  05/17/2024    INFLUENZA VACCINE  08/01/2024    BMI FOLLOWUP  06/12/2025    TDAP/TD VACCINES (2 - Tdap) 03/02/2032    Pneumococcal Vaccine 65+  Completed                CMS Preventative Services Quick Reference  Risk Factors Identified During Encounter  Flu and covid vaccine in the fall.         The above risks/problems have been discussed with the patient.  Pertinent information has been shared with the patient in the After Visit Summary.  An After Visit Summary and PPPS were made available to the patient.    Follow Up:   Next Medicare Wellness visit to be scheduled in 1 year.       Additional E&M Note during same encounter follows:  Patient has multiple medical problems which are significant and separately identifiable that require additional work above and beyond the Medicare Wellness Visit.      Chief Complaint  Medicare  "Wellness-subsequent    Subjective        Shanique Martinez is also being seen today for AWV and follow up chronic conditions:     She is here with daughter today.      Hyperlipidemia  This is a chronic problem. The current episode started more than 1 year ago. The problem is controlled. She has no history of diabetes or hypothyroidism. There are no known factors aggravating her hyperlipidemia. Pertinent negatives include no chest pain or shortness of breath. Current antihyperlipidemic treatment includes statins.    Risk factors for coronary artery disease include a sedentary lifestyle.       Back Pain  This is a chronic problem. The current episode started more than 1 year ago. The problem occurs constantly. The problem is improved. The pain is present in the lumbar spine. The quality of the pain is described as aching. The symptoms are aggravated by position. Stiffness is present all day. Associated symptoms include weakness. Pertinent negatives include no chest pain, fever, perianal numbness or tingling. Risk factors include sedentary lifestyle, lack of exercise and menopause. She has tried home exercises, heat, ice, muscle relaxant, NSAIDs and walking for the symptoms. The treatment provided mild relief.      Chronic LBP: followed by pain management   Office Visit with Melva Mehta APRN (04/22/2024)   States after ablation better - ronel helps to control now.   Norco: continues twice a day.       OA: knees  Dr Clark injects every 6 months.  Due soon.   Etodolac routinely and takes with food.         Objective   Vital Signs:  /72 (BP Location: Left arm, Patient Position: Sitting, Cuff Size: Adult)   Pulse 69   Ht 162.6 cm (64\")   Wt 75.1 kg (165 lb 8 oz)   SpO2 96%   BMI 28.41 kg/m²     Physical Exam  Constitutional:       Appearance: Normal appearance.   Cardiovascular:      Rate and Rhythm: Normal rate and regular rhythm.      Pulses: Normal pulses.   Pulmonary:      Effort: Pulmonary effort is " normal.      Breath sounds: Normal breath sounds.   Abdominal:      General: Bowel sounds are normal.   Neurological:      Mental Status: She is oriented to person, place, and time.          The following data was reviewed by: Alvarez Ron III, NP-C on 06/12/2024:  Common labs          11/13/2023    09:40   Common Labs   Glucose 84    BUN 19    Creatinine 1.05    Sodium 141    Potassium 4.2    Chloride 103    Calcium 10.0                 Assessment and Plan     Problem List Items Addressed This Visit          Cardiac and Vasculature    Hyperlipidemia LDL goal <130 (Chronic)    Overview     Current medication: lipitor 10 mg daily          Current Assessment & Plan     Lipid abnormalities are improving with treatment.  Pharmacotherapy as ordered.  Lipids will be reassessed in 6 months.    Lab Results   Component Value Date    CHOL 175 05/03/2017    CHLPL 171 05/17/2023    TRIG 89 05/17/2023    HDL 63 (H) 05/17/2023    LDL 92 05/17/2023             Relevant Orders    Comprehensive Metabolic Panel    Lipid Panel With LDL / HDL Ratio    CBC (No Diff)    TSH Rfx On Abnormal To Free T4    Stasis edema of both lower extremities (Chronic)    Current Assessment & Plan     Continue lasix   Labs to monitor             Musculoskeletal and Injuries    Arthritis (Chronic)    Current Assessment & Plan     Continues etodolac.     Always take with food and not when dehydrated (not eating or drinking)          Chronic left-sided low back pain with left-sided sciatica (Chronic)    Current Assessment & Plan     States much improved on current regimen: continue follow up with pain mgt.           Other Visit Diagnoses       Medicare annual wellness visit, subsequent    -  Primary                      Follow Up     Return in about 6 months (around 12/12/2024).    Patient was given instructions and counseling regarding her condition or for health maintenance advice. Please see specific information pulled into the AVS if  appropriate.

## 2024-06-24 ENCOUNTER — OFFICE VISIT (OUTPATIENT)
Dept: PAIN MEDICINE | Facility: CLINIC | Age: 89
End: 2024-06-24
Payer: MEDICARE

## 2024-06-24 VITALS
WEIGHT: 165.6 LBS | DIASTOLIC BLOOD PRESSURE: 68 MMHG | BODY MASS INDEX: 28.27 KG/M2 | SYSTOLIC BLOOD PRESSURE: 112 MMHG | HEART RATE: 77 BPM | HEIGHT: 64 IN | TEMPERATURE: 97.8 F | OXYGEN SATURATION: 96 %

## 2024-06-24 DIAGNOSIS — M48.061 NEURAL FORAMINAL STENOSIS OF LUMBAR SPINE: ICD-10-CM

## 2024-06-24 DIAGNOSIS — M54.16 LEFT LUMBAR RADICULOPATHY: ICD-10-CM

## 2024-06-24 DIAGNOSIS — G89.29 CHRONIC LEFT-SIDED LOW BACK PAIN WITH LEFT-SIDED SCIATICA: ICD-10-CM

## 2024-06-24 DIAGNOSIS — G89.4 CHRONIC PAIN SYNDROME: ICD-10-CM

## 2024-06-24 DIAGNOSIS — M47.816 LUMBAR FACET ARTHROPATHY: Primary | ICD-10-CM

## 2024-06-24 DIAGNOSIS — Z79.899 ENCOUNTER FOR LONG-TERM (CURRENT) DRUG USE: ICD-10-CM

## 2024-06-24 DIAGNOSIS — M62.838 MUSCLE SPASM: ICD-10-CM

## 2024-06-24 DIAGNOSIS — M54.42 CHRONIC LEFT-SIDED LOW BACK PAIN WITH LEFT-SIDED SCIATICA: ICD-10-CM

## 2024-06-24 PROCEDURE — 1159F MED LIST DOCD IN RCRD: CPT

## 2024-06-24 PROCEDURE — 1160F RVW MEDS BY RX/DR IN RCRD: CPT

## 2024-06-24 PROCEDURE — 99213 OFFICE O/P EST LOW 20 MIN: CPT

## 2024-06-24 PROCEDURE — 1125F AMNT PAIN NOTED PAIN PRSNT: CPT

## 2024-06-24 RX ORDER — HYDROCODONE BITARTRATE AND ACETAMINOPHEN 5; 325 MG/1; MG/1
1 TABLET ORAL 2 TIMES DAILY PRN
Qty: 60 TABLET | Refills: 0 | Status: SHIPPED | OUTPATIENT
Start: 2024-06-24

## 2024-06-24 NOTE — PROGRESS NOTES
CHIEF COMPLAINT  Back pain    Subjective   Shanique Martinez is a 88 y.o. female  who presents for follow-up.  She has a history of chronic low back pain. She reports that her pain level has fluctuated since her last office visit and varies based on activity level.    Today pain is 1/10VAS in severity. Pain is located in her low back, mostly on the left side. She denies radicular pain. Describes this pain as an intermittent ache. Pain is worsened by increased physical activity, household chores  that require twisting or prolonged standing, and walking long distances. Pain improves with rest/reposition, use of a TENS unit, and medications.      She continues with Hydrocodone 5mg 1-2/day, Gabapentin 100mg TID, Etodolac 200mg TID, Tylenol Arthritis PRN and Flexeril 10mg 2-3/day. Denies any side effects from the regimen, including constipation and somnolence. Denies any bowel or bladder changes.      Patient saw Dr. Ludwig St on 8/2/2022 - recommended patient avoid surgery     She gets bilateral knee gel injections with Dr. Clark every 6 months. She is scheduled for gel injections next month.      Procedures:  6/15/23 - Left L3-L5 RFA - 50% ongoing relief to back pain  5/25/23 - Left L3-L5 MBB - 80% relief x 2 days  3/23/23 - Left L3-L5 MBB -  80% relief x 4 days  1/5/23 - L4/L5 LESI - 80% ongoing relief  10/27/22 - Left L4 TF LESI - 95% relief x 2 week    Back Pain  This is a chronic problem. The problem occurs intermittently. The problem has been comes and goes since onset. The pain is present in the lumbar spine (L side > R side). The quality of the pain is described as aching. The pain radiates to the left thigh (left lateral/posterior thigh). The pain is at a severity of 1/10 (severity in pain varies based on activity level). The symptoms are aggravated by standing and position (increased physical activity, prolonged standing, walking long distances). Pertinent negatives include no abdominal pain, chest pain,  "dysuria, fever, headaches, numbness or weakness. Risk factors include sedentary lifestyle. She has tried muscle relaxant and NSAIDs (rest/reposition, PT in the past, TENS unit, Tylenol, Lodine, Flexeril) for the symptoms.      PEG Assessment   What number best describes your pain on average in the past week?3  What number best describes how, during the past week, pain has interfered with your enjoyment of life?3  What number best describes how, during the past week, pain has interfered with your general activity?  3    Review of Pertinent Medical Data ---      The following portions of the patient's history were reviewed and updated as appropriate: allergies, current medications, past family history, past medical history, past social history, past surgical history, and problem list.    Review of Systems   Constitutional:  Negative for activity change, fatigue and fever.   Cardiovascular:  Negative for chest pain.   Gastrointestinal:  Negative for abdominal pain, constipation and diarrhea.   Genitourinary:  Negative for difficulty urinating and dysuria.   Musculoskeletal:  Positive for back pain.   Neurological:  Negative for dizziness, weakness, light-headedness, numbness and headaches.   Psychiatric/Behavioral:  Negative for agitation, sleep disturbance and suicidal ideas. The patient is not nervous/anxious.      I have reviewed and confirmed the accuracy of the ROS as documented by the MA/LPN/RN LALITHA Best    Vitals:    06/24/24 1048   BP: 112/68   BP Location: Left arm   Patient Position: Sitting   Cuff Size: Large Adult   Pulse: 77   Temp: 97.8 °F (36.6 °C)   SpO2: 96%   Weight: 75.1 kg (165 lb 9.6 oz)   Height: 162.6 cm (64\")   PainSc:   1   PainLoc: Back     Objective   Physical Exam  Constitutional:       Appearance: Normal appearance.   HENT:      Head: Normocephalic.   Cardiovascular:      Rate and Rhythm: Normal rate and regular rhythm.   Pulmonary:      Effort: Pulmonary effort is normal.      " Breath sounds: Normal breath sounds.   Musculoskeletal:      Cervical back: Tenderness: left side. Muscular tenderness present.      Lumbar back: Tenderness and bony tenderness present. Decreased range of motion. Negative right straight leg raise test and negative left straight leg raise test.      Right lower leg: Edema present.      Left lower leg: Edema present.      Comments: + lumbar facet loading/tenderness   Skin:     General: Skin is warm and dry.      Capillary Refill: Capillary refill takes less than 2 seconds.   Neurological:      General: No focal deficit present.      Mental Status: She is alert and oriented to person, place, and time.      Gait: Gait abnormal (slowed, ambulates with walker).   Psychiatric:         Mood and Affect: Mood normal.         Behavior: Behavior normal.         Thought Content: Thought content normal.         Cognition and Memory: Cognition normal.       Assessment & Plan   Diagnoses and all orders for this visit:    1. Lumbar facet arthropathy (Primary)  -     HYDROcodone-acetaminophen (NORCO) 5-325 MG per tablet; Take 1 tablet by mouth 2 (Two) Times a Day As Needed (Pain). 30 day supply. DNF 7/7/24  Dispense: 60 tablet; Refill: 0    2. Left lumbar radiculopathy  -     HYDROcodone-acetaminophen (NORCO) 5-325 MG per tablet; Take 1 tablet by mouth 2 (Two) Times a Day As Needed (Pain). 30 day supply. DNF 7/7/24  Dispense: 60 tablet; Refill: 0    3. Chronic left-sided low back pain with left-sided sciatica  -     HYDROcodone-acetaminophen (NORCO) 5-325 MG per tablet; Take 1 tablet by mouth 2 (Two) Times a Day As Needed (Pain). 30 day supply. DNF 7/7/24  Dispense: 60 tablet; Refill: 0    4. Muscle spasm    5. Chronic pain syndrome    6. Neural foraminal stenosis of lumbar spine    7. Encounter for long-term (current) drug use      Shanique Martinez reports a pain score of 1.  Given her pain assessment as noted, treatment options were discussed and the following options were decided  upon as a follow-up plan to address the patient's pain: continuation of current treatment plan for pain and prescription for opiod analgesics.    --- The urine drug screen confirmation from 4/22/24 has been reviewed and the result is appropriate based on patient history and ELE report  --- The patient signed an updated copy of the controlled substance agreement on 4/22/24   --- Continue Flexeril and Gabapentin. Refill sent to pharmacy.  --- Continue Hydrocodone.  DNF 7/7/24. Patient appears stable with current regimen. No adverse effects. Regarding continuation of opioids, there is no evidence of aberrant behavior or any red flags.  The patient continues with appropriate response to opioid therapy. ADL's remain intact by self.   --- Repeat Left L3-L5 RFA when needed  --- Follow-up 2 months or sooner if needed     ELE REPORT  As part of the patient's treatment plan, I am prescribing controlled substances. The patient has been made aware of appropriate use of such medications, including potential risk of somnolence, limited ability to drive and/or work safely, and the potential for dependence or overdose. It has also been made clear that these medications are for use by this patient only, without concomitant use of alcohol or other substances unless prescribed.     Patient has completed prescribing agreement detailing terms of continued prescribing of controlled substances, including monitoring ELE reports, urine drug screening, and pill counts if necessary. The patient is aware that inappropriate use will results in cessation of prescribing such medications.    As the clinician, I personally reviewed the ELE from 6/24/24 while the patient was in the office today.    History and physical exam exhibit continued safe and appropriate use of controlled substances.    Dictated utilizing Dragon dictation.

## 2024-07-08 DIAGNOSIS — M54.42 CHRONIC LEFT-SIDED LOW BACK PAIN WITH LEFT-SIDED SCIATICA: ICD-10-CM

## 2024-07-08 DIAGNOSIS — G89.29 CHRONIC LEFT-SIDED LOW BACK PAIN WITH LEFT-SIDED SCIATICA: ICD-10-CM

## 2024-07-08 DIAGNOSIS — M47.816 LUMBAR FACET ARTHROPATHY: ICD-10-CM

## 2024-07-08 DIAGNOSIS — M54.16 LEFT LUMBAR RADICULOPATHY: ICD-10-CM

## 2024-07-08 RX ORDER — HYDROCODONE BITARTRATE AND ACETAMINOPHEN 5; 325 MG/1; MG/1
1 TABLET ORAL 2 TIMES DAILY PRN
Qty: 60 TABLET | Refills: 0 | OUTPATIENT
Start: 2024-07-08

## 2024-07-08 NOTE — TELEPHONE ENCOUNTER
Caller: CATHLEEN GALE    Relationship: PATIENT    Best call back number: [phone]    Requested Prescriptions:   Requested Prescriptions     Pending Prescriptions Disp Refills    HYDROcodone-acetaminophen (NORCO) 5-325 MG per tablet 60 tablet 0     Sig: Take 1 tablet by mouth 2 (Two) Times a Day As Needed (Pain). 30 day supply. DNF 7/7/24        Pharmacy where request should be sent:  [facility] [phone]    Last office visit with prescribing clinician: 6/24/2024   Last telemedicine visit with prescribing clinician: Visit date not found   Next office visit with prescribing clinician: 8/19/2024     Additional details provided by patient:  PATIENT WILL RUN OUT OF RX ON THURSDAY 7/11/24    Does the patient have less than a 3 day supply:  [x] Yes  [] No    Would you like a call back once the refill request has been completed: [] Yes [] No    If the office needs to give you a call back, can they leave a voicemail: [] Yes [] No    Kimberli Morales   07/08/24 10:27 EDT

## 2024-07-18 DIAGNOSIS — G89.29 CHRONIC LEFT-SIDED LOW BACK PAIN WITH LEFT-SIDED SCIATICA: ICD-10-CM

## 2024-07-18 DIAGNOSIS — M62.838 MUSCLE SPASM: ICD-10-CM

## 2024-07-18 DIAGNOSIS — M54.42 CHRONIC LEFT-SIDED LOW BACK PAIN WITH LEFT-SIDED SCIATICA: ICD-10-CM

## 2024-07-18 RX ORDER — CYCLOBENZAPRINE HCL 10 MG
10 TABLET ORAL 3 TIMES DAILY
Qty: 90 TABLET | Refills: 1 | Status: SHIPPED | OUTPATIENT
Start: 2024-07-18

## 2024-07-18 RX ORDER — ETODOLAC 200 MG/1
200 CAPSULE ORAL 3 TIMES DAILY
Qty: 90 CAPSULE | Refills: 1 | Status: SHIPPED | OUTPATIENT
Start: 2024-07-18

## 2024-07-18 NOTE — TELEPHONE ENCOUNTER
Rx Refill Note  Requested Prescriptions     Pending Prescriptions Disp Refills    etodolac (LODINE) 200 MG capsule [Pharmacy Med Name: ETODOLAC 200 MG CAPSULE] 90 capsule 1     Sig: TAKE 1 CAPSULE BY MOUTH 3 TIMES A DAY      Last office visit with prescribing clinician: 6/12/2024  Next office visit with prescribing clinician: 12/16/2024         Sonja Goff MA  07/18/24, 12:35 EDT

## 2024-08-09 ENCOUNTER — TELEPHONE (OUTPATIENT)
Dept: PAIN MEDICINE | Facility: CLINIC | Age: 89
End: 2024-08-09
Payer: MEDICARE

## 2024-08-09 DIAGNOSIS — G89.29 CHRONIC LEFT-SIDED LOW BACK PAIN WITH LEFT-SIDED SCIATICA: ICD-10-CM

## 2024-08-09 DIAGNOSIS — M54.16 LEFT LUMBAR RADICULOPATHY: ICD-10-CM

## 2024-08-09 DIAGNOSIS — M54.42 CHRONIC LEFT-SIDED LOW BACK PAIN WITH LEFT-SIDED SCIATICA: ICD-10-CM

## 2024-08-09 DIAGNOSIS — M47.816 LUMBAR FACET ARTHROPATHY: ICD-10-CM

## 2024-08-09 RX ORDER — HYDROCODONE BITARTRATE AND ACETAMINOPHEN 5; 325 MG/1; MG/1
1 TABLET ORAL 2 TIMES DAILY PRN
Qty: 60 TABLET | Refills: 0 | Status: SHIPPED | OUTPATIENT
Start: 2024-08-09

## 2024-08-09 NOTE — TELEPHONE ENCOUNTER
Caller: Shanique Martinez    Relationship: Self    Best call back number: [phone]*    Requested Prescriptions: HYDROCODONE  5-325 MG    Pharmacy where request should be sent:   [facility] [id number] - [address] - [phone]  - [phone]        Last office visit with prescribing clinician: 6/24/2024   Last telemedicine visit with prescribing clinician: Visit date not found   Next office visit with prescribing clinician: 8/19/2024     Additional details provided by patient: N/A    Does the patient have less than a 3 day supply:  [] Yes  [x] No    Would you like a call back once the refill request has been completed: [x] Yes [] No    If the office needs to give you a call back, can they leave a voicemail: [x] Yes [] No    Dung Mckeon   08/09/24 11:11 EDT

## 2024-08-09 NOTE — TELEPHONE ENCOUNTER
Medication Refill Request    Date of phone call: 8-9-24    Medication being requested: norco 5-325 mg sig: Take 1 tablet by mouth 2 (Two) Times a Day As Needed (Pain). 30 day supply   Qty: 60    Date of last visit: 6-24-24    Date of last refill:     ELE up to date?:     Next Follow up?: 8-19-24    Any new pertinent information? (i.e, new medication allergies, new use of medications, change in patient's health or condition, non-compliance or inconsistency with prescribing agreement?):

## 2024-08-09 NOTE — TELEPHONE ENCOUNTER
Reviewed last office visit on 6/24/2024. UDS and ELE reviewed and are appropriate. Due to provider being out of office, will refill appropriately.    Covering for LALITHA Walker

## 2024-08-14 DIAGNOSIS — M54.16 LEFT LUMBAR RADICULOPATHY: ICD-10-CM

## 2024-08-15 RX ORDER — GABAPENTIN 100 MG/1
100 CAPSULE ORAL 3 TIMES DAILY
Qty: 90 CAPSULE | Refills: 2 | Status: SHIPPED | OUTPATIENT
Start: 2024-08-15

## 2024-08-19 ENCOUNTER — OFFICE VISIT (OUTPATIENT)
Dept: PAIN MEDICINE | Facility: CLINIC | Age: 89
End: 2024-08-19
Payer: MEDICARE

## 2024-08-19 VITALS
BODY MASS INDEX: 28.07 KG/M2 | OXYGEN SATURATION: 93 % | TEMPERATURE: 98.3 F | WEIGHT: 164.4 LBS | SYSTOLIC BLOOD PRESSURE: 160 MMHG | HEIGHT: 64 IN | RESPIRATION RATE: 12 BRPM | DIASTOLIC BLOOD PRESSURE: 70 MMHG | HEART RATE: 76 BPM

## 2024-08-19 DIAGNOSIS — Z79.899 ENCOUNTER FOR LONG-TERM (CURRENT) DRUG USE: ICD-10-CM

## 2024-08-19 DIAGNOSIS — G89.29 CHRONIC LEFT-SIDED LOW BACK PAIN WITH LEFT-SIDED SCIATICA: ICD-10-CM

## 2024-08-19 DIAGNOSIS — M54.16 LEFT LUMBAR RADICULOPATHY: Primary | ICD-10-CM

## 2024-08-19 DIAGNOSIS — G89.4 CHRONIC PAIN SYNDROME: ICD-10-CM

## 2024-08-19 DIAGNOSIS — M47.816 LUMBAR FACET ARTHROPATHY: ICD-10-CM

## 2024-08-19 DIAGNOSIS — M62.838 MUSCLE SPASM: ICD-10-CM

## 2024-08-19 DIAGNOSIS — M54.42 CHRONIC LEFT-SIDED LOW BACK PAIN WITH LEFT-SIDED SCIATICA: ICD-10-CM

## 2024-08-19 PROCEDURE — 1159F MED LIST DOCD IN RCRD: CPT

## 2024-08-19 PROCEDURE — 99213 OFFICE O/P EST LOW 20 MIN: CPT

## 2024-08-19 PROCEDURE — 1125F AMNT PAIN NOTED PAIN PRSNT: CPT

## 2024-08-19 PROCEDURE — 1160F RVW MEDS BY RX/DR IN RCRD: CPT

## 2024-08-19 NOTE — PROGRESS NOTES
CHIEF COMPLAINT  Back pain    Subjective   Shanique Martinez is a 88 y.o. female  who presents for follow-up.  She has a history of chronic low back pain. Today pain is 2/10VAS in severity. Pain is located in her low back, mostly on the left side. She denies radicular pain. Describes this pain as an intermittent ache. Pain is worsened by increased physical activity, household chores  that require twisting or prolonged standing, and walking long distances. Pain improves with rest/reposition, use of a TENS unit, and medications.      She continues with Hydrocodone 5mg 1-2/day, Gabapentin 100mg TID, Etodolac 200mg TID, Tylenol Arthritis PRN and Flexeril 10mg 2-3/day. Denies any side effects from the regimen, including constipation and somnolence. Denies any bowel or bladder changes.      Patient saw Dr. Ludwig St on 8/2/2022 - recommended patient avoid surgery     She gets bilateral knee gel injections with Dr. Clark every 6 months. She is scheduled for gel injections next month.      Procedures:  6/15/23 - Left L3-L5 RFA - 50% ongoing relief to back pain  5/25/23 - Left L3-L5 MBB - 80% relief x 2 days  3/23/23 - Left L3-L5 MBB -  80% relief x 4 days  1/5/23 - L4/L5 LESI - 80% ongoing relief  10/27/22 - Left L4 TF LESI - 95% relief x 2 week    Back Pain  This is a chronic problem. The problem occurs intermittently. The problem is unchanged. The pain is present in the lumbar spine (L side > R side). The quality of the pain is described as aching. The pain radiates to the left thigh (left lateral/posterior thigh). The pain is at a severity of 2/10 (severity in pain varies based on activity level). The symptoms are aggravated by standing and position (increased physical activity, prolonged standing, walking long distances). Pertinent negatives include no abdominal pain, chest pain, dysuria, fever, headaches, numbness or weakness. Risk factors include sedentary lifestyle. She has tried muscle relaxant and NSAIDs  "(rest/reposition, PT in the past, TENS unit, Tylenol, Lodine, Flexeril) for the symptoms.      PEG Assessment   What number best describes your pain on average in the past week?3  What number best describes how, during the past week, pain has interfered with your enjoyment of life?0  What number best describes how, during the past week, pain has interfered with your general activity?  0    Review of Pertinent Medical Data ---        The following portions of the patient's history were reviewed and updated as appropriate: allergies, current medications, past family history, past medical history, past social history, past surgical history, and problem list.    Review of Systems   Constitutional:  Negative for activity change, fatigue and fever.   Respiratory:  Negative for cough and chest tightness.    Cardiovascular:  Negative for chest pain.   Gastrointestinal:  Negative for abdominal pain, constipation and diarrhea.   Genitourinary:  Negative for difficulty urinating and dysuria.   Musculoskeletal:  Positive for back pain.   Neurological:  Negative for dizziness, weakness, light-headedness, numbness and headaches.   Psychiatric/Behavioral:  Negative for agitation, sleep disturbance and suicidal ideas. The patient is not nervous/anxious.      I have reviewed and confirmed the accuracy of the ROS as documented by the MA/LPN/RN LALITHA Best    Vitals:    08/19/24 1244   BP: 160/70   BP Location: Left arm   Patient Position: Sitting   Cuff Size: Adult   Pulse: 76   Resp: 12   Temp: 98.3 °F (36.8 °C)   TempSrc: Temporal   SpO2: 93%   Weight: 74.6 kg (164 lb 6.4 oz)   Height: 162.6 cm (64\")   PainSc:   2     Objective   Physical Exam  Constitutional:       Appearance: Normal appearance.   HENT:      Head: Normocephalic.   Cardiovascular:      Rate and Rhythm: Normal rate and regular rhythm.   Pulmonary:      Effort: Pulmonary effort is normal.      Breath sounds: Normal breath sounds.   Musculoskeletal:      " Cervical back: Tenderness: left side. Muscular tenderness present.      Lumbar back: Tenderness and bony tenderness present. Decreased range of motion. Negative right straight leg raise test and negative left straight leg raise test.      Right lower leg: Edema present.      Left lower leg: Edema present.      Comments: + lumbar facet loading/tenderness  + SI joint tenderness bilaterally  + SI ISAAC's test bilaterally   Skin:     General: Skin is warm and dry.      Capillary Refill: Capillary refill takes less than 2 seconds.   Neurological:      General: No focal deficit present.      Mental Status: She is alert and oriented to person, place, and time.      Gait: Gait abnormal (slowed, ambulates with walker).   Psychiatric:         Mood and Affect: Mood normal.         Behavior: Behavior normal.         Thought Content: Thought content normal.         Cognition and Memory: Cognition normal.       Assessment & Plan   Diagnoses and all orders for this visit:    1. Left lumbar radiculopathy (Primary)    2. Lumbar facet arthropathy    3. Chronic left-sided low back pain with left-sided sciatica    4. Muscle spasm    5. Chronic pain syndrome    6. Encounter for long-term (current) drug use      Shanique Martinez reports a pain score of 2.  Given her pain assessment as noted, treatment options were discussed and the following options were decided upon as a follow-up plan to address the patient's pain: continuation of current treatment plan for pain.    --- The urine drug screen confirmation from 4/22/24 has been reviewed and the result is appropriate based on patient history and ELE report  --- The patient signed an updated copy of the controlled substance agreement on 4/22/24   --- Continue Flexeril and Gabapentin. No refills needed at this time.   --- Continue Hydrocodone.  No refill needed at this time. Patient appears stable with current regimen. No adverse effects. Regarding continuation of opioids, there is no  evidence of aberrant behavior or any red flags.  The patient continues with appropriate response to opioid therapy. ADL's remain intact by self.   --- Repeat Left L3-L5 RFA when needed  --- Brief discussion on SI joint injections. Patient states she had this injection prior to establishing care with our clinic which offered long term relief. May repeat injection if needed.   --- Follow-up 2 months or sooner if needed    Pain / Disability Scale  The scale used for measurement of pain and/or disability for this patient was the [place] back pain disability scale.  The score was 1 on 08/19/2024     ELE REPORT  As part of the patient's treatment plan, I am prescribing controlled substances. The patient has been made aware of appropriate use of such medications, including potential risk of somnolence, limited ability to drive and/or work safely, and the potential for dependence or overdose. It has also been made clear that these medications are for use by this patient only, without concomitant use of alcohol or other substances unless prescribed.     Patient has completed prescribing agreement detailing terms of continued prescribing of controlled substances, including monitoring ELE reports, urine drug screening, and pill counts if necessary. The patient is aware that inappropriate use will results in cessation of prescribing such medications.    As the clinician, I personally reviewed the ELE from 8/19/24 while the patient was in the office today.    History and physical exam exhibit continued safe and appropriate use of controlled substances.    Dictated utilizing Dragon dictation.

## 2024-09-12 DIAGNOSIS — M54.16 LEFT LUMBAR RADICULOPATHY: ICD-10-CM

## 2024-09-12 DIAGNOSIS — M54.42 CHRONIC LEFT-SIDED LOW BACK PAIN WITH LEFT-SIDED SCIATICA: ICD-10-CM

## 2024-09-12 DIAGNOSIS — M47.816 LUMBAR FACET ARTHROPATHY: ICD-10-CM

## 2024-09-12 DIAGNOSIS — G89.29 CHRONIC LEFT-SIDED LOW BACK PAIN WITH LEFT-SIDED SCIATICA: ICD-10-CM

## 2024-09-12 RX ORDER — HYDROCODONE BITARTRATE AND ACETAMINOPHEN 5; 325 MG/1; MG/1
1 TABLET ORAL 2 TIMES DAILY PRN
Qty: 60 TABLET | Refills: 0 | Status: SHIPPED | OUTPATIENT
Start: 2024-09-12

## 2024-09-12 NOTE — TELEPHONE ENCOUNTER
Caller: Shanique Martinez    Relationship to patient: Self    Patient is needing: SHANIQUE CALLED BACK AND CONFIRMED IT IS THE HYDROCODONE THAT NEEDS TO BE REFILLED

## 2024-09-12 NOTE — TELEPHONE ENCOUNTER
Caller: Juan Shanique SIMMONS    Relationship: Self    Best call back number: [phone]    Requested Prescriptions:   HYDROCODONE 5-325MG     Pharmacy where request should be sent: [facility] [id number] - [address] AT [facility] - [phone]  - [phone] FX     Last office visit with prescribing clinician: 8/19/2024   Last telemedicine visit with prescribing clinician: Visit date not found   Next office visit with prescribing clinician: 10/28/2024     Additional details provided by patient: CALLED DROPPED AFTER CONFIRMING THE MEDICATION NEEDING TO BE REFILLED. UNABLE TO ANSWER CALL BACK QUESTIONS. ATTEMPTED TO CALL PT, UNABLE TO CONNECT.     Does the patient have less than a 3 day supply:  [] Yes  [x] No    Would you like a call back once the refill request has been completed: [] Yes [] No    If the office needs to give you a call back, can they leave a voicemail: [] Yes [] No    Kris Champagne   09/12/24 10:18 EDT

## 2024-09-18 DIAGNOSIS — M47.816 LUMBAR FACET ARTHROPATHY: ICD-10-CM

## 2024-09-18 DIAGNOSIS — M54.42 CHRONIC LEFT-SIDED LOW BACK PAIN WITH LEFT-SIDED SCIATICA: ICD-10-CM

## 2024-09-18 DIAGNOSIS — M54.16 LEFT LUMBAR RADICULOPATHY: ICD-10-CM

## 2024-09-18 DIAGNOSIS — G89.29 CHRONIC LEFT-SIDED LOW BACK PAIN WITH LEFT-SIDED SCIATICA: ICD-10-CM

## 2024-09-18 RX ORDER — HYDROCODONE BITARTRATE AND ACETAMINOPHEN 5; 325 MG/1; MG/1
1 TABLET ORAL 2 TIMES DAILY PRN
Qty: 60 TABLET | Refills: 0 | Status: SHIPPED | OUTPATIENT
Start: 2024-09-18

## 2024-09-25 DIAGNOSIS — M62.838 MUSCLE SPASM: ICD-10-CM

## 2024-09-25 DIAGNOSIS — M54.42 CHRONIC LEFT-SIDED LOW BACK PAIN WITH LEFT-SIDED SCIATICA: ICD-10-CM

## 2024-09-25 DIAGNOSIS — G89.29 CHRONIC LEFT-SIDED LOW BACK PAIN WITH LEFT-SIDED SCIATICA: ICD-10-CM

## 2024-09-25 RX ORDER — ETODOLAC 200 MG/1
200 CAPSULE ORAL 3 TIMES DAILY
Qty: 90 CAPSULE | Refills: 1 | Status: SHIPPED | OUTPATIENT
Start: 2024-09-25

## 2024-09-26 RX ORDER — CYCLOBENZAPRINE HCL 10 MG
10 TABLET ORAL 3 TIMES DAILY
Qty: 90 TABLET | Refills: 1 | Status: SHIPPED | OUTPATIENT
Start: 2024-09-26

## 2024-10-04 DIAGNOSIS — I87.303 STASIS EDEMA OF BOTH LOWER EXTREMITIES: ICD-10-CM

## 2024-10-04 RX ORDER — FUROSEMIDE 20 MG
20 TABLET ORAL EVERY MORNING
Qty: 90 TABLET | Refills: 1 | Status: SHIPPED | OUTPATIENT
Start: 2024-10-04

## 2024-10-07 DIAGNOSIS — I87.303 STASIS EDEMA OF BOTH LOWER EXTREMITIES: ICD-10-CM

## 2024-10-07 RX ORDER — FUROSEMIDE 20 MG
20 TABLET ORAL EVERY MORNING
Qty: 90 TABLET | Refills: 1 | OUTPATIENT
Start: 2024-10-07

## 2024-10-15 DIAGNOSIS — I87.303 STASIS EDEMA OF BOTH LOWER EXTREMITIES: ICD-10-CM

## 2024-10-15 RX ORDER — POTASSIUM CHLORIDE 750 MG/1
10 TABLET, EXTENDED RELEASE ORAL DAILY
Qty: 90 TABLET | Refills: 1 | Status: SHIPPED | OUTPATIENT
Start: 2024-10-15

## 2024-10-18 DIAGNOSIS — G89.29 CHRONIC LEFT-SIDED LOW BACK PAIN WITH LEFT-SIDED SCIATICA: ICD-10-CM

## 2024-10-18 DIAGNOSIS — M47.816 LUMBAR FACET ARTHROPATHY: ICD-10-CM

## 2024-10-18 DIAGNOSIS — M54.42 CHRONIC LEFT-SIDED LOW BACK PAIN WITH LEFT-SIDED SCIATICA: ICD-10-CM

## 2024-10-18 DIAGNOSIS — M54.16 LEFT LUMBAR RADICULOPATHY: ICD-10-CM

## 2024-10-18 RX ORDER — HYDROCODONE BITARTRATE AND ACETAMINOPHEN 5; 325 MG/1; MG/1
1 TABLET ORAL 2 TIMES DAILY PRN
Qty: 60 TABLET | Refills: 0 | Status: SHIPPED | OUTPATIENT
Start: 2024-10-18

## 2024-10-28 ENCOUNTER — OFFICE VISIT (OUTPATIENT)
Dept: PAIN MEDICINE | Facility: CLINIC | Age: 89
End: 2024-10-28
Payer: MEDICARE

## 2024-10-28 VITALS
WEIGHT: 165.6 LBS | TEMPERATURE: 96 F | DIASTOLIC BLOOD PRESSURE: 90 MMHG | HEART RATE: 72 BPM | BODY MASS INDEX: 28.27 KG/M2 | OXYGEN SATURATION: 95 % | SYSTOLIC BLOOD PRESSURE: 132 MMHG | HEIGHT: 64 IN

## 2024-10-28 DIAGNOSIS — M62.838 MUSCLE SPASM: ICD-10-CM

## 2024-10-28 DIAGNOSIS — G89.4 CHRONIC PAIN SYNDROME: ICD-10-CM

## 2024-10-28 DIAGNOSIS — Z79.899 ENCOUNTER FOR LONG-TERM (CURRENT) DRUG USE: ICD-10-CM

## 2024-10-28 DIAGNOSIS — M54.16 LEFT LUMBAR RADICULOPATHY: ICD-10-CM

## 2024-10-28 DIAGNOSIS — M54.42 CHRONIC LEFT-SIDED LOW BACK PAIN WITH LEFT-SIDED SCIATICA: ICD-10-CM

## 2024-10-28 DIAGNOSIS — G89.29 CHRONIC LEFT-SIDED LOW BACK PAIN WITH LEFT-SIDED SCIATICA: ICD-10-CM

## 2024-10-28 DIAGNOSIS — M47.816 LUMBAR FACET ARTHROPATHY: Primary | ICD-10-CM

## 2024-10-28 PROCEDURE — 1160F RVW MEDS BY RX/DR IN RCRD: CPT

## 2024-10-28 PROCEDURE — 1125F AMNT PAIN NOTED PAIN PRSNT: CPT

## 2024-10-28 PROCEDURE — 99213 OFFICE O/P EST LOW 20 MIN: CPT

## 2024-10-28 PROCEDURE — 80305 DRUG TEST PRSMV DIR OPT OBS: CPT

## 2024-10-28 PROCEDURE — 1159F MED LIST DOCD IN RCRD: CPT

## 2024-10-28 NOTE — PROGRESS NOTES
CHIEF COMPLAINT  Back pain    Subjective   Shanique Martinez is a 88 y.o. female  who presents for follow-up.  She has a history of  chronic low back pain. Today pain is 1/10VAS in severity. Pain is located in her low back, mostly on the left side and bilateral knees. She denies radicular pain. Describes this pain as an intermittent ache. Pain is worsened by increased physical activity, household chores  that require twisting or prolonged standing, and walking long distances. Pain improves with rest/reposition, use of a TENS unit, and medications.      She continues with Hydrocodone 5mg 1-2/day, Gabapentin 100mg TID, Etodolac 200mg TID, Tylenol Arthritis PRN and Flexeril 10mg 2-3/day. Denies any side effects from the regimen, including constipation and somnolence. Denies any bowel or bladder changes.      Patient saw Dr. Ludwig St on 8/2/2022 - recommended patient avoid surgery     She gets bilateral knee gel injections with Dr. Clark every 6 months. Next injections are in January.      Procedures:  6/15/23 - Left L3-L5 RFA - 50% ongoing relief to back pain  5/25/23 - Left L3-L5 MBB - 80% relief x 2 days  3/23/23 - Left L3-L5 MBB -  80% relief x 4 days  1/5/23 - L4/L5 LESI - 80% ongoing relief  10/27/22 - Left L4 TF LESI - 95% relief x 2 week    Back Pain  This is a chronic problem. The problem occurs intermittently. The problem has been comes and goes since onset. The pain is present in the lumbar spine (L side > R side). The quality of the pain is described as aching. The pain radiates to the left thigh (left lateral/posterior thigh). The pain is at a severity of 1/10 (severity in pain varies based on activity level). The symptoms are aggravated by standing and position (increased physical activity, prolonged standing, walking long distances). Pertinent negatives include no abdominal pain, chest pain, dysuria, fever, headaches, numbness or weakness. Risk factors include sedentary lifestyle. She has tried  "muscle relaxant and NSAIDs (rest/reposition, PT in the past, TENS unit, Tylenol, Lodine, Flexeril) for the symptoms.     PEG Assessment   What number best describes your pain on average in the past week?2  What number best describes how, during the past week, pain has interfered with your enjoyment of life?0  What number best describes how, during the past week, pain has interfered with your general activity?  6    Review of Pertinent Medical Data ---      The following portions of the patient's history were reviewed and updated as appropriate: allergies, current medications, past family history, past medical history, past social history, past surgical history, and problem list.    Review of Systems   Constitutional:  Negative for fever.   Cardiovascular:  Negative for chest pain.   Gastrointestinal:  Negative for abdominal pain.   Genitourinary:  Negative for dysuria.   Musculoskeletal:  Positive for back pain.   Neurological:  Negative for weakness, numbness and headaches.     I have reviewed and confirmed the accuracy of the ROS as documented by the MA/LPN/RN LALITHA Best    Vitals:    10/28/24 0915   BP: 132/90   Patient Position: Sitting   Pulse: 72   Temp: 96 °F (35.6 °C)   SpO2: 95%   Weight: 75.1 kg (165 lb 9.6 oz)   Height: 162.6 cm (64\")   PainSc:   1   PainLoc: Back     Objective   Physical Exam  Constitutional:       Appearance: Normal appearance.   HENT:      Head: Normocephalic.   Cardiovascular:      Rate and Rhythm: Normal rate and regular rhythm.   Pulmonary:      Effort: Pulmonary effort is normal.      Breath sounds: Normal breath sounds.   Musculoskeletal:      Cervical back: Tenderness: left side. Muscular tenderness present.      Lumbar back: Tenderness and bony tenderness present. Decreased range of motion. Negative right straight leg raise test and negative left straight leg raise test.      Right knee: Decreased range of motion. Tenderness present.      Left knee: Decreased range " of motion. Tenderness present.      Right lower leg: Edema present.      Left lower leg: Edema present.      Comments: + lumbar facet loading/tenderness  + SI joint tenderness bilaterally     Skin:     General: Skin is warm and dry.      Capillary Refill: Capillary refill takes less than 2 seconds.   Neurological:      General: No focal deficit present.      Mental Status: She is alert and oriented to person, place, and time.      Gait: Gait abnormal (slowed, ambulates with walker).   Psychiatric:         Mood and Affect: Mood normal.         Behavior: Behavior normal.         Thought Content: Thought content normal.         Cognition and Memory: Cognition normal.       Assessment & Plan   Diagnoses and all orders for this visit:    1. Lumbar facet arthropathy (Primary)    2. Left lumbar radiculopathy    3. Chronic left-sided low back pain with left-sided sciatica    4. Muscle spasm    5. Chronic pain syndrome    6. Encounter for long-term (current) drug use      Shanique Martinez reports a pain score of 1.  Given her pain assessment as noted, treatment options were discussed and the following options were decided upon as a follow-up plan to address the patient's pain: continuation of current treatment plan for pain.    --- Routine UDS in office today as part of monitoring requirements for controlled substances.  The specimen was viewed and the immunoassay result reviewed and is +OPI.  This specimen will be sent to [facility] for confirmation.     --- The patient signed an updated copy of the controlled substance agreement on 4/22/24   --- Continue Flexeril and Gabapentin. No refills needed at this time.   --- Continue Hydrocodone.  No refill needed at this time. Patient appears stable with current regimen. No adverse effects. Regarding continuation of opioids, there is no evidence of aberrant behavior or any red flags.  The patient continues with appropriate response to opioid therapy. ADL's remain intact  by self.   --- Repeat Left L3-L5 RFA when needed  --- Brief discussion on genicular nerve blocks - may consider if knee pain were to worsen.   --- Follow-up 2 months or sooner if needed     ELE REPORT  As part of the patient's treatment plan, I am prescribing controlled substances. The patient has been made aware of appropriate use of such medications, including potential risk of somnolence, limited ability to drive and/or work safely, and the potential for dependence or overdose. It has also been made clear that these medications are for use by this patient only, without concomitant use of alcohol or other substances unless prescribed.     Patient has completed prescribing agreement detailing terms of continued prescribing of controlled substances, including monitoring ELE reports, urine drug screening, and pill counts if necessary. The patient is aware that inappropriate use will results in cessation of prescribing such medications.    As the clinician, I personally reviewed the ELE from 10/28/24 while the patient was in the office today.    History and physical exam exhibit continued safe and appropriate use of controlled substances.    Dictated utilizing Dragon dictation.

## 2024-11-19 DIAGNOSIS — G89.29 CHRONIC LEFT-SIDED LOW BACK PAIN WITH LEFT-SIDED SCIATICA: ICD-10-CM

## 2024-11-19 DIAGNOSIS — M54.16 LEFT LUMBAR RADICULOPATHY: ICD-10-CM

## 2024-11-19 DIAGNOSIS — M54.42 CHRONIC LEFT-SIDED LOW BACK PAIN WITH LEFT-SIDED SCIATICA: ICD-10-CM

## 2024-11-19 DIAGNOSIS — M47.816 LUMBAR FACET ARTHROPATHY: ICD-10-CM

## 2024-11-19 RX ORDER — HYDROCODONE BITARTRATE AND ACETAMINOPHEN 5; 325 MG/1; MG/1
1 TABLET ORAL 2 TIMES DAILY PRN
Qty: 60 TABLET | Refills: 0 | Status: SHIPPED | OUTPATIENT
Start: 2024-11-19

## 2024-11-19 RX ORDER — GABAPENTIN 100 MG/1
100 CAPSULE ORAL 3 TIMES DAILY
Qty: 90 CAPSULE | OUTPATIENT
Start: 2024-11-19

## 2024-11-19 RX ORDER — GABAPENTIN 100 MG/1
100 CAPSULE ORAL 3 TIMES DAILY
Qty: 90 CAPSULE | Refills: 2 | Status: SHIPPED | OUTPATIENT
Start: 2024-11-19

## 2024-11-26 DIAGNOSIS — G89.29 CHRONIC LEFT-SIDED LOW BACK PAIN WITH LEFT-SIDED SCIATICA: ICD-10-CM

## 2024-11-26 DIAGNOSIS — M62.838 MUSCLE SPASM: ICD-10-CM

## 2024-11-26 DIAGNOSIS — M54.42 CHRONIC LEFT-SIDED LOW BACK PAIN WITH LEFT-SIDED SCIATICA: ICD-10-CM

## 2024-11-26 RX ORDER — CYCLOBENZAPRINE HCL 10 MG
10 TABLET ORAL 3 TIMES DAILY
Qty: 90 TABLET | Refills: 1 | Status: SHIPPED | OUTPATIENT
Start: 2024-11-26

## 2024-11-26 RX ORDER — ETODOLAC 200 MG/1
200 CAPSULE ORAL 3 TIMES DAILY
Qty: 90 CAPSULE | Refills: 1 | Status: SHIPPED | OUTPATIENT
Start: 2024-11-26

## 2024-11-26 NOTE — TELEPHONE ENCOUNTER
Rx Refill Note  Requested Prescriptions     Pending Prescriptions Disp Refills    etodolac (LODINE) 200 MG capsule [Pharmacy Med Name: ETODOLAC 200 MG CAPSULE] 90 capsule 1     Sig: TAKE 1 CAPSULE BY MOUTH 3 TIMES A DAY      Last office visit with prescribing clinician: 6/12/2024  Next office visit with prescribing clinician: 12/16/2024         Sonja Goff MA  11/26/24, 16:50 EST

## 2024-12-06 NOTE — CASE MANAGEMENT/SOCIAL WORK
Discharge Planning Assessment  [facility]     Patient Name: Shanique Martinez  MRN: [id number]  Today's Date: 3/13/2022    Admit Date: 3/13/2022     Discharge Needs Assessment     Row Name 03/13/22 1347       Living Environment    People in Home alone    Current Living Arrangements home    Primary Care Provided by self    Provides Primary Care For no one    Family Caregiver if Needed child(david), adult    Family Caregiver Names Hermila Farias [phone]    Quality of Family Relationships involved    Able to Return to Prior Arrangements yes       Resource/Environmental Concerns    Resource/Environmental Concerns none    Transportation Concerns none       Transition Planning    Patient/Family Anticipates Transition to home    Patient/Family Anticipated Services at Transition none    Transportation Anticipated car, drives self;family or friend will provide       Discharge Needs Assessment    Readmission Within the Last 30 Days no previous admission in last 30 days    Equipment Currently Used at Home cane, quad tip  Pt has a walker and a wheelchair that is available for use    Concerns to be Addressed no discharge needs identified;denies needs/concerns at this time    Anticipated Changes Related to Illness none    Equipment Needed After Discharge none    Provided Post Acute Provider List? N/A    Provided Post Acute Provider Quality & Resource List? N/A               Discharge Plan     Row Name 03/13/22 1348       Plan    Plan Pt intends to return home upon discharge    Provided Post Acute Provider List? N/A    Provided Post Acute Provider Quality & Resource List? N/A    Plan Comments Face sheet verified, role of CCP explained. Pt is independent in ADL's, uses a cane if needed. Has available a walker and a wheelchair. Daughter can provide any care needs if needed. Denies any difficulty paying for medications              Continued Care and Services - Admitted Since 3/13/2022    Coordination has not been started for this  Goal Outcome Evaluation:  Plan of Care Reviewed With: patient        Progress: improving  Outcome Evaluation: Patient still alert to self. Bilateral abdomen wound vac remain in place. Dressings changed per orders. Q2 turns. Calix in place with clear, yellow output. No acute changes overnight.                              encounter.          Demographic Summary    No documentation.                Functional Status    No documentation.                Psychosocial    No documentation.                Abuse/Neglect    No documentation.                Legal    No documentation.                Substance Abuse    No documentation.                Patient Forms    No documentation.                   Sherry Hinojosa RN

## 2024-12-16 ENCOUNTER — OFFICE VISIT (OUTPATIENT)
Dept: PAIN MEDICINE | Facility: CLINIC | Age: 89
End: 2024-12-16
Payer: MEDICARE

## 2024-12-16 ENCOUNTER — OFFICE VISIT (OUTPATIENT)
Dept: INTERNAL MEDICINE | Facility: CLINIC | Age: 89
End: 2024-12-16
Payer: MEDICARE

## 2024-12-16 VITALS
TEMPERATURE: 97.5 F | WEIGHT: 161.4 LBS | RESPIRATION RATE: 16 BRPM | OXYGEN SATURATION: 96 % | HEIGHT: 64 IN | BODY MASS INDEX: 27.55 KG/M2 | HEART RATE: 78 BPM | DIASTOLIC BLOOD PRESSURE: 70 MMHG | SYSTOLIC BLOOD PRESSURE: 150 MMHG

## 2024-12-16 VITALS
SYSTOLIC BLOOD PRESSURE: 132 MMHG | HEART RATE: 76 BPM | WEIGHT: 160.2 LBS | DIASTOLIC BLOOD PRESSURE: 80 MMHG | HEIGHT: 64 IN | BODY MASS INDEX: 27.35 KG/M2 | OXYGEN SATURATION: 95 %

## 2024-12-16 DIAGNOSIS — I87.303 STASIS EDEMA OF BOTH LOWER EXTREMITIES: Chronic | ICD-10-CM

## 2024-12-16 DIAGNOSIS — M54.42 CHRONIC LEFT-SIDED LOW BACK PAIN WITH LEFT-SIDED SCIATICA: Primary | Chronic | ICD-10-CM

## 2024-12-16 DIAGNOSIS — G89.29 CHRONIC LEFT-SIDED LOW BACK PAIN WITH LEFT-SIDED SCIATICA: ICD-10-CM

## 2024-12-16 DIAGNOSIS — M47.816 LUMBAR FACET ARTHROPATHY: ICD-10-CM

## 2024-12-16 DIAGNOSIS — M19.90 ARTHRITIS: Chronic | ICD-10-CM

## 2024-12-16 DIAGNOSIS — M62.838 MUSCLE SPASM: Primary | ICD-10-CM

## 2024-12-16 DIAGNOSIS — M54.16 LEFT LUMBAR RADICULOPATHY: ICD-10-CM

## 2024-12-16 DIAGNOSIS — G89.29 CHRONIC LEFT-SIDED LOW BACK PAIN WITH LEFT-SIDED SCIATICA: Primary | Chronic | ICD-10-CM

## 2024-12-16 DIAGNOSIS — Z79.899 ENCOUNTER FOR LONG-TERM (CURRENT) DRUG USE: ICD-10-CM

## 2024-12-16 DIAGNOSIS — M54.42 CHRONIC LEFT-SIDED LOW BACK PAIN WITH LEFT-SIDED SCIATICA: ICD-10-CM

## 2024-12-16 DIAGNOSIS — G89.4 CHRONIC PAIN SYNDROME: ICD-10-CM

## 2024-12-16 DIAGNOSIS — E78.5 HYPERLIPIDEMIA LDL GOAL <130: Chronic | ICD-10-CM

## 2024-12-16 LAB
BUN SERPL-MCNC: 26 MG/DL (ref 8–23)
BUN/CREAT SERPL: 25 (ref 7–25)
CALCIUM SERPL-MCNC: 9.7 MG/DL (ref 8.6–10.5)
CHLORIDE SERPL-SCNC: 106 MMOL/L (ref 98–107)
CO2 SERPL-SCNC: 27.2 MMOL/L (ref 22–29)
CREAT SERPL-MCNC: 1.04 MG/DL (ref 0.57–1)
EGFRCR SERPLBLD CKD-EPI 2021: 52.1 ML/MIN/1.73
GLUCOSE SERPL-MCNC: 80 MG/DL (ref 65–99)
POTASSIUM SERPL-SCNC: 4.7 MMOL/L (ref 3.5–5.2)
SODIUM SERPL-SCNC: 142 MMOL/L (ref 136–145)

## 2024-12-16 PROCEDURE — G2211 COMPLEX E/M VISIT ADD ON: HCPCS | Performed by: NURSE PRACTITIONER

## 2024-12-16 PROCEDURE — 99214 OFFICE O/P EST MOD 30 MIN: CPT | Performed by: NURSE PRACTITIONER

## 2024-12-16 PROCEDURE — 99213 OFFICE O/P EST LOW 20 MIN: CPT

## 2024-12-16 PROCEDURE — 1159F MED LIST DOCD IN RCRD: CPT

## 2024-12-16 PROCEDURE — 1160F RVW MEDS BY RX/DR IN RCRD: CPT

## 2024-12-16 PROCEDURE — 1125F AMNT PAIN NOTED PAIN PRSNT: CPT

## 2024-12-16 PROCEDURE — 1125F AMNT PAIN NOTED PAIN PRSNT: CPT | Performed by: NURSE PRACTITIONER

## 2024-12-16 RX ORDER — HYDROCODONE BITARTRATE AND ACETAMINOPHEN 5; 325 MG/1; MG/1
1 TABLET ORAL 2 TIMES DAILY PRN
Qty: 60 TABLET | Refills: 0 | Status: SHIPPED | OUTPATIENT
Start: 2024-12-19

## 2024-12-16 NOTE — PROGRESS NOTES
Chief Complaint  Hyperlipidemia     Subjective:      History of Present Illness {CC  Problem List  Visit  Diagnosis   Encounters  Notes  Medications  Labs  Result Review Imaging  Media :23}     Shanique Martinez presents to [facility] for:        Hyperlipidemia  This is a chronic problem. The current episode started more than 1 year ago. The problem is controlled. She has no history of diabetes or hypothyroidism. There are no known factors aggravating her hyperlipidemia. Pertinent negatives include no chest pain or shortness of breath. Current antihyperlipidemic treatment includes statins.    Risk factors for coronary artery disease include a sedentary lifestyle.      Back Pain  This is a chronic problem. The current episode started more than 1 year ago. The problem occurs constantly. The problem is improved. The pain is present in the lumbar spine. The quality of the pain is described as aching. The symptoms are aggravated by position. Stiffness is present all day. Associated symptoms include weakness. Pertinent negatives include no chest pain, fever, perianal numbness or tingling. Risk factors include sedentary lifestyle, lack of exercise and menopause. She has tried home exercises, heat, ice, muscle relaxant, NSAIDs and walking for the symptoms. The treatment provided mild relief.      Chronic LBP: followed by pain management   Office Visit with Melva Mehta APRN (04/22/2024)   States after ablation better.  Continues ronel and help.   Norco: continues twice a day.       With daughter today - no new issues.               Answers submitted by the patient for this visit:  Primary Reason for Visit (Submitted on 12/13/2024)  What is the primary reason for your visit?: Problem Not Listed  Problem not listed (Submitted on 12/13/2024)  Chief Complaint: Other medical problem  Reason for appointment: Annual Check up  abdominal pain: No  anorexia: No  joint pain:  "Yes  change in stool: No  chest pain: No  chills: No  nasal congestion: No  cough: No  diaphoresis: No  fatigue: No  fever: No  headaches: No  joint swelling: Yes  myalgias: Yes  nausea: No  neck pain: Yes  numbness: Yes  rash: No  sore throat: No  swollen glands: No  dysuria: No  vertigo: No  visual change: No  vomiting: No  weakness: Yes  Onset: more than 5 years  Chronicity: chronic  Frequency: daily  Medications tried: ERtodokac, Hydrocortosone      I have reviewed patient's medical history, any new submitted information provided by patient or medical assistant and updated medical record.      Objective:      Physical Exam  Constitutional:       Comments: Ambulates with walker    Cardiovascular:      Rate and Rhythm: Normal rate and regular rhythm.      Pulses: Normal pulses.      Heart sounds: Normal heart sounds.   Pulmonary:      Effort: Pulmonary effort is normal.      Breath sounds: Normal breath sounds.        Result Review  Data Reviewed:{ Labs  Result Review  Imaging  Med Tab  Media :23}     The following data was reviewed by: Alvarez Ron III, NP-C on 12/16/2024  Common labs          6/12/2024    10:10   Common Labs   Glucose 83    BUN 19    Creatinine 1.13    Sodium 143    Potassium 4.7    Chloride 106    Calcium 9.8    Total Protein 6.7    Albumin 4.3    Total Bilirubin 0.5    Alkaline Phosphatase 138    AST (SGOT) 24    ALT (SGPT) 15    WBC 4.80    Hemoglobin 11.2    Hematocrit 34.6    Platelets 336    Total Cholesterol 165    Triglycerides 84    HDL Cholesterol 64    LDL Cholesterol  85             Vital Signs:   /80 (BP Location: Left arm, Patient Position: Sitting, Cuff Size: Adult)   Pulse 76   Ht 162.6 cm (64\")   Wt 72.7 kg (160 lb 3.2 oz)   SpO2 95%   BMI 27.50 kg/m²   Estimated body mass index is 27.5 kg/m² as calculated from the following:    Height as of this encounter: 162.6 cm (64\").    Weight as of this encounter: 72.7 kg (160 lb 3.2 oz).        Requested " Prescriptions      No prescriptions requested or ordered in this encounter       Routine medications provided by this office will also be refilled via pharmacy request.       Current Outpatient Medications:     atorvastatin (LIPITOR) 10 MG tablet, TAKE ONE TABLET BY MOUTH DAILY, Disp: 90 tablet, Rfl: 4    CALCIUM PO, Take 1 tablet by mouth Daily., Disp: , Rfl:     cyclobenzaprine (FLEXERIL) 10 MG tablet, TAKE 1 TABLET BY MOUTH 3 TIMES A DAY, Disp: 90 tablet, Rfl: 1    etodolac (LODINE) 200 MG capsule, TAKE 1 CAPSULE BY MOUTH 3 TIMES A DAY, Disp: 90 capsule, Rfl: 1    furosemide (LASIX) 20 MG tablet, TAKE 1 TABLET BY MOUTH EVERY MORNING, Disp: 90 tablet, Rfl: 1    gabapentin (NEURONTIN) 100 MG capsule, Take 1 capsule by mouth 3 (Three) Times a Day., Disp: 90 capsule, Rfl: 2    potassium chloride (KLOR-CON M10) 10 MEQ CR tablet, TAKE 1 TABLET BY MOUTH DAILY, Disp: 90 tablet, Rfl: 1    prednisoLONE acetate (PRED FORTE) 1 % ophthalmic suspension, , Disp: , Rfl:     [START ON 12/19/2024] HYDROcodone-acetaminophen (NORCO) 5-325 MG per tablet, Take 1 tablet by mouth 2 (Two) Times a Day As Needed for Pain., Disp: 60 tablet, Rfl: 0     Assessment and Plan:      Assessment and Plan {CC Problem List  Visit Diagnosis  ROS  Review (Popup)  [facility] Maintenance  Quality  BestPractice  Medications  SmartSets  SnapShot Encounters  Media :23}     Diagnoses and all orders for this visit:    1. Chronic left-sided low back pain with left-sided sciatica (Primary)  Assessment & Plan:  States much improved on current regimen: continue follow up with pain mgt.       2. Hyperlipidemia LDL goal <130  Overview:  Current medication: lipitor 10 mg daily     Assessment & Plan:  Lipid abnormalities are improving with treatment.  Pharmacotherapy as ordered.  Lipids will be reassessed in 6 months.    Lab Results   Component Value Date    CHOL 175 05/03/2017    CHLPL 165 06/12/2024    TRIG 84 06/12/2024    HDL 64 (H) 06/12/2024    LDL 85  06/12/2024          3. Stasis edema of both lower extremities  -     Basic Metabolic Panel    4. Arthritis  Assessment & Plan:  Continues etodolac.     Always take with food and not when dehydrated (not eating or drinking)                No orders of the defined types were placed in this encounter.            Follow Up {Instructions Charge Capture  Follow-up Communications :23}     Return in about 6 months (around 6/16/2025) for Medicare Wellness.      Patient was given instructions and counseling regarding her condition or for health maintenance advice. Please see specific information pulled into the AVS if appropriate.    Dragon disclaimer:   Much of this encounter note is an electronic transcription/translation of spoken language to printed text. The electronic translation of spoken language may permit erroneous, or at times, nonsensical words or phrases to be inadvertently transcribed; Although I have reviewed the note for such errors, some may still exist.     Additional Patient Counseling:       There are no Patient Instructions on file for this visit.

## 2024-12-16 NOTE — PROGRESS NOTES
CHIEF COMPLAINT  Back pain    Subjective   Shanique Martinez is a 88 y.o. female  who presents for follow-up.  She has a history of chronic low back pain. She reports that her pain has remained consistent since her last office visit. Today pain is 2/10VAS in severity. Pain is located in her low back, mostly on the left side and bilateral knees. She denies radicular pain. Describes this pain as an intermittent ache. Pain is worsened by increased physical activity, household chores  that require twisting or prolonged standing, and walking long distances. Pain improves with rest/reposition, use of a TENS unit, and medications.      She continues with Hydrocodone 5mg 1-2/day, Gabapentin 100mg TID, Etodolac 200mg TID, Tylenol Arthritis PRN and Flexeril 10mg 2-3/day. Denies any side effects from the regimen, including constipation and somnolence. Denies any bowel or bladder changes.      Patient saw Dr. Ludwig St on 8/2/2022 - recommended patient avoid surgery     She gets bilateral knee gel injections with Dr. Clark every 6 months. Next injections are in January.      Procedures:  6/15/23 - Left L3-L5 RFA - 50% ongoing relief to back pain  5/25/23 - Left L3-L5 MBB - 80% relief x 2 days  3/23/23 - Left L3-L5 MBB -  80% relief x 4 days  1/5/23 - L4/L5 LESI - 80% ongoing relief  10/27/22 - Left L4 TF LESI - 95% relief x 2 week    Back Pain  This is a chronic problem. The problem occurs intermittently. The problem has been comes and goes since onset. The pain is present in the lumbar spine (L side > R side). The quality of the pain is described as aching. The pain radiates to the left thigh (left lateral/posterior thigh). The pain is at a severity of 1/10 (severity in pain varies based on activity level). The symptoms are aggravated by standing and position (increased physical activity, prolonged standing, walking long distances). Associated symptoms include numbness (fingers). Pertinent negatives include no abdominal  "pain, chest pain, dysuria, fever, headaches or weakness. Risk factors include sedentary lifestyle. She has tried muscle relaxant and NSAIDs (rest/reposition, PT in the past, TENS unit, Tylenol, Lodine, Flexeril) for the symptoms.     PEG Assessment   What number best describes your pain on average in the past week?4  What number best describes how, during the past week, pain has interfered with your enjoyment of life?5  What number best describes how, during the past week, pain has interfered with your general activity?  0    Review of Pertinent Medical Data ---        The following portions of the patient's history were reviewed and updated as appropriate: allergies, current medications, past family history, past medical history, past social history, past surgical history, and problem list.    Review of Systems   Constitutional:  Negative for activity change, fatigue and fever.   Respiratory:  Negative for cough and chest tightness.    Cardiovascular:  Negative for chest pain.   Gastrointestinal:  Negative for abdominal pain, constipation and diarrhea.   Genitourinary:  Negative for dysuria.   Musculoskeletal:  Positive for back pain.   Neurological:  Positive for numbness (fingers). Negative for dizziness, weakness, light-headedness and headaches.   Psychiatric/Behavioral:  Negative for agitation, sleep disturbance and suicidal ideas. The patient is not nervous/anxious.      I have reviewed and confirmed the accuracy of the ROS as documented by the MA/LPN/RN LALITHA Best    Vitals:    12/16/24 1046   BP: 150/70   BP Location: Right arm   Patient Position: Sitting   Cuff Size: Adult   Pulse: 78   Resp: 16   Temp: 97.5 °F (36.4 °C)   TempSrc: Temporal   SpO2: 96%   Weight: 73.2 kg (161 lb 6.4 oz)   Height: 162.6 cm (64\")   PainSc:   2     Objective   Physical Exam  Constitutional:       Appearance: Normal appearance.   HENT:      Head: Normocephalic.   Cardiovascular:      Rate and Rhythm: Normal rate and " regular rhythm.   Pulmonary:      Effort: Pulmonary effort is normal.      Breath sounds: Normal breath sounds.   Musculoskeletal:      Cervical back: Tenderness: left side. Muscular tenderness present.      Lumbar back: Tenderness and bony tenderness present. Decreased range of motion. Negative right straight leg raise test and negative left straight leg raise test.      Right knee: Decreased range of motion. Tenderness present.      Left knee: Decreased range of motion. Tenderness present.      Right lower leg: Edema present.      Left lower leg: Edema present.      Comments: + lumbar facet loading/tenderness     Skin:     General: Skin is warm and dry.      Capillary Refill: Capillary refill takes less than 2 seconds.   Neurological:      General: No focal deficit present.      Mental Status: She is alert and oriented to person, place, and time.      Gait: Gait abnormal (slowed, ambulates with walker).   Psychiatric:         Mood and Affect: Mood normal.         Behavior: Behavior normal.         Thought Content: Thought content normal.         Cognition and Memory: Cognition normal.       Assessment & Plan   Diagnoses and all orders for this visit:    1. Muscle spasm (Primary)    2. Lumbar facet arthropathy  -     HYDROcodone-acetaminophen (NORCO) 5-325 MG per tablet; Take 1 tablet by mouth 2 (Two) Times a Day As Needed for Pain.  Dispense: 60 tablet; Refill: 0    3. Left lumbar radiculopathy  -     HYDROcodone-acetaminophen (NORCO) 5-325 MG per tablet; Take 1 tablet by mouth 2 (Two) Times a Day As Needed for Pain.  Dispense: 60 tablet; Refill: 0    4. Chronic left-sided low back pain with left-sided sciatica  -     HYDROcodone-acetaminophen (NORCO) 5-325 MG per tablet; Take 1 tablet by mouth 2 (Two) Times a Day As Needed for Pain.  Dispense: 60 tablet; Refill: 0    5. Chronic pain syndrome    6. Encounter for long-term (current) drug use      Shanique Martinez reports a pain score of 2.  Given her pain assessment  as noted, treatment options were discussed and the following options were decided upon as a follow-up plan to address the patient's pain: continuation of current treatment plan for pain and prescription for opiod analgesics.    --- The urine drug screen confirmation from 10/28/24 has been reviewed and the result is appropriate based on patient history and ELE report  --- The patient signed an updated copy of the controlled substance agreement on 4/22/24   --- Continue Flexeril and Gabapentin. No refills needed at this time.   --- Continue Hydrocodone.  DNF 12/19/24. Patient appears stable with current regimen. No adverse effects. Regarding continuation of opioids, there is no evidence of aberrant behavior or any red flags.  The patient continues with appropriate response to opioid therapy. ADL's remain intact by self.   --- Repeat Left L3-L5 RFA when needed  --- Follow-up 2 months or sooner if needed     ELE REPORT  As part of the patient's treatment plan, I am prescribing controlled substances. The patient has been made aware of appropriate use of such medications, including potential risk of somnolence, limited ability to drive and/or work safely, and the potential for dependence or overdose. It has also been made clear that these medications are for use by this patient only, without concomitant use of alcohol or other substances unless prescribed.     Patient has completed prescribing agreement detailing terms of continued prescribing of controlled substances, including monitoring ELE reports, urine drug screening, and pill counts if necessary. The patient is aware that inappropriate use will results in cessation of prescribing such medications.    As the clinician, I personally reviewed the ELE from 12/16/24 while the patient was in the office today.    History and physical exam exhibit continued safe and appropriate use of controlled substances.    Dictated utilizing Dragon dictation.

## 2024-12-16 NOTE — ASSESSMENT & PLAN NOTE
Lipid abnormalities are improving with treatment.  Pharmacotherapy as ordered.  Lipids will be reassessed in 6 months.    Lab Results   Component Value Date    CHOL 175 05/03/2017    CHLPL 165 06/12/2024    TRIG 84 06/12/2024    HDL 64 (H) 06/12/2024    LDL 85 06/12/2024

## 2025-01-17 DIAGNOSIS — M47.816 LUMBAR FACET ARTHROPATHY: ICD-10-CM

## 2025-01-17 DIAGNOSIS — M54.16 LEFT LUMBAR RADICULOPATHY: ICD-10-CM

## 2025-01-17 DIAGNOSIS — M54.42 CHRONIC LEFT-SIDED LOW BACK PAIN WITH LEFT-SIDED SCIATICA: ICD-10-CM

## 2025-01-17 DIAGNOSIS — G89.29 CHRONIC LEFT-SIDED LOW BACK PAIN WITH LEFT-SIDED SCIATICA: ICD-10-CM

## 2025-01-17 RX ORDER — HYDROCODONE BITARTRATE AND ACETAMINOPHEN 5; 325 MG/1; MG/1
1 TABLET ORAL 2 TIMES DAILY PRN
Qty: 60 TABLET | Refills: 0 | Status: SHIPPED | OUTPATIENT
Start: 2025-01-17

## 2025-01-17 NOTE — TELEPHONE ENCOUNTER
Reviewed UDS and ELE. Both updated and appropriate. Refill appropriate.      Covering for Melva DOTY

## 2025-01-28 DIAGNOSIS — M62.838 MUSCLE SPASM: ICD-10-CM

## 2025-01-28 DIAGNOSIS — M54.42 CHRONIC LEFT-SIDED LOW BACK PAIN WITH LEFT-SIDED SCIATICA: ICD-10-CM

## 2025-01-28 DIAGNOSIS — G89.29 CHRONIC LEFT-SIDED LOW BACK PAIN WITH LEFT-SIDED SCIATICA: ICD-10-CM

## 2025-01-28 RX ORDER — ETODOLAC 200 MG/1
200 CAPSULE ORAL 3 TIMES DAILY
Qty: 90 CAPSULE | Refills: 1 | Status: SHIPPED | OUTPATIENT
Start: 2025-01-28

## 2025-01-28 RX ORDER — CYCLOBENZAPRINE HCL 10 MG
10 TABLET ORAL 3 TIMES DAILY
Qty: 90 TABLET | Refills: 1 | Status: SHIPPED | OUTPATIENT
Start: 2025-01-28

## 2025-01-28 NOTE — TELEPHONE ENCOUNTER
Rx Refill Note  Requested Prescriptions     Pending Prescriptions Disp Refills    etodolac (LODINE) 200 MG capsule [Pharmacy Med Name: ETODOLAC 200 MG CAPSULE] 90 capsule 1     Sig: TAKE 1 CAPSULE BY MOUTH 3 TIMES A DAY      Last office visit with prescribing clinician: 12/16/2024  Next office visit with prescribing clinician: 6/23/2025         Sonja Goff MA  01/28/25, 10:03 EST

## 2025-02-17 ENCOUNTER — OFFICE VISIT (OUTPATIENT)
Dept: PAIN MEDICINE | Facility: CLINIC | Age: 89
End: 2025-02-17
Payer: MEDICARE

## 2025-02-17 VITALS
TEMPERATURE: 98.6 F | HEIGHT: 64 IN | RESPIRATION RATE: 18 BRPM | SYSTOLIC BLOOD PRESSURE: 159 MMHG | BODY MASS INDEX: 28.68 KG/M2 | HEART RATE: 89 BPM | WEIGHT: 168 LBS | DIASTOLIC BLOOD PRESSURE: 83 MMHG

## 2025-02-17 DIAGNOSIS — M47.816 LUMBAR FACET ARTHROPATHY: ICD-10-CM

## 2025-02-17 DIAGNOSIS — M54.42 CHRONIC LEFT-SIDED LOW BACK PAIN WITH LEFT-SIDED SCIATICA: ICD-10-CM

## 2025-02-17 DIAGNOSIS — M62.838 MUSCLE SPASM: Primary | ICD-10-CM

## 2025-02-17 DIAGNOSIS — G89.4 CHRONIC PAIN SYNDROME: ICD-10-CM

## 2025-02-17 DIAGNOSIS — Z79.899 ENCOUNTER FOR LONG-TERM (CURRENT) DRUG USE: ICD-10-CM

## 2025-02-17 DIAGNOSIS — G89.29 CHRONIC LEFT-SIDED LOW BACK PAIN WITH LEFT-SIDED SCIATICA: ICD-10-CM

## 2025-02-17 DIAGNOSIS — M54.16 LEFT LUMBAR RADICULOPATHY: ICD-10-CM

## 2025-02-17 PROCEDURE — 1125F AMNT PAIN NOTED PAIN PRSNT: CPT

## 2025-02-17 PROCEDURE — 1159F MED LIST DOCD IN RCRD: CPT

## 2025-02-17 PROCEDURE — 1160F RVW MEDS BY RX/DR IN RCRD: CPT

## 2025-02-17 PROCEDURE — 99213 OFFICE O/P EST LOW 20 MIN: CPT

## 2025-02-17 RX ORDER — GABAPENTIN 100 MG/1
100 CAPSULE ORAL 3 TIMES DAILY
Qty: 90 CAPSULE | Refills: 2 | Status: SHIPPED | OUTPATIENT
Start: 2025-02-17

## 2025-02-17 RX ORDER — HYDROCODONE BITARTRATE AND ACETAMINOPHEN 5; 325 MG/1; MG/1
1 TABLET ORAL 2 TIMES DAILY PRN
Qty: 60 TABLET | Refills: 0 | Status: SHIPPED | OUTPATIENT
Start: 2025-02-17

## 2025-02-17 NOTE — PROGRESS NOTES
CHIEF COMPLAINT  Back pain    Subjective   Shanique Martinez is a 89 y.o. female  who presents for follow-up.  She has a history of chronic low back pain. She reports that her pain has remained consistent since her last office visit. Today pain is 2/10VAS in severity. Pain is located in her low back, mostly on the left side and bilateral knees. She denies radicular pain. Describes this pain as an intermittent ache. Pain is worsened by increased physical activity, household chores  that require twisting or prolonged standing, and walking long distances. Pain improves with rest/reposition, use of a TENS unit, and medications.      She continues with Hydrocodone 5mg 1-2/day, Gabapentin 100mg TID, Etodolac 200mg TID, Tylenol Arthritis PRN and Flexeril 10mg 2-3/day. Denies any side effects from the regimen, including constipation and somnolence. Denies any bowel or bladder changes.      Patient saw Dr. Ludwig St on 8/2/2022 - recommended patient avoid surgery     She gets bilateral knee gel injections with Dr. Clark every 6 months. Last injections in January.      Procedures:  6/15/23 - Left L3-L5 RFA - 50% ongoing relief to back pain  5/25/23 - Left L3-L5 MBB - 80% relief x 2 days  3/23/23 - Left L3-L5 MBB -  80% relief x 4 days  1/5/23 - L4/L5 LESI - 80% ongoing relief  10/27/22 - Left L4 TF LESI - 95% relief x 2 week    Back Pain  This is a chronic problem. The problem occurs intermittently. The problem is unchanged. The pain is present in the lumbar spine (L side > R side). The quality of the pain is described as aching. The pain radiates to the left thigh (left lateral/posterior thigh). The pain is at a severity of 1/10 (severity in pain varies based on activity level). The symptoms are aggravated by standing and position (increased physical activity, prolonged standing, walking long distances). Associated symptoms include numbness. Pertinent negatives include no abdominal pain, chest pain, dysuria, fever,  "headaches or weakness. Risk factors include sedentary lifestyle. She has tried muscle relaxant and NSAIDs (rest/reposition, PT in the past, TENS unit, Tylenol, Lodine, Flexeril) for the symptoms.     PEG Assessment   What number best describes your pain on average in the past week?1  What number best describes how, during the past week, pain has interfered with your enjoyment of life?0  What number best describes how, during the past week, pain has interfered with your general activity?  0    Review of Pertinent Medical Data ---      The following portions of the patient's history were reviewed and updated as appropriate: allergies, current medications, past family history, past medical history, past social history, past surgical history, and problem list.    Review of Systems   Constitutional:  Negative for fever.   Cardiovascular:  Negative for chest pain.   Gastrointestinal:  Negative for abdominal pain, constipation and diarrhea.   Genitourinary:  Negative for difficulty urinating and dysuria.   Musculoskeletal:  Positive for back pain.   Neurological:  Positive for numbness. Negative for weakness and headaches.   Psychiatric/Behavioral:  Negative for sleep disturbance and suicidal ideas. The patient is not nervous/anxious.      I have reviewed and confirmed the accuracy of the ROS as documented by the MA/LPN/RN Melva Mehta, APRN    Vitals:    02/17/25 1000   BP: 159/83   Pulse: 89   Resp: 18   Temp: 98.6 °F (37 °C)   Weight: 76.2 kg (168 lb)   Height: 162.6 cm (64\")   PainSc:   1   PainLoc: Back     Objective   Physical Exam  Constitutional:       Appearance: Normal appearance.   HENT:      Head: Normocephalic.   Cardiovascular:      Rate and Rhythm: Normal rate and regular rhythm.   Pulmonary:      Effort: Pulmonary effort is normal.      Breath sounds: Normal breath sounds.   Musculoskeletal:      Cervical back: Tenderness: left side. Muscular tenderness present.      Lumbar back: Tenderness and bony " tenderness present. Decreased range of motion. Negative right straight leg raise test and negative left straight leg raise test.      Right knee: Decreased range of motion. Tenderness present.      Left knee: Decreased range of motion. Tenderness present.      Right lower leg: Edema present.      Left lower leg: Edema present.      Comments: + lumbar facet loading/tenderness     Skin:     General: Skin is warm and dry.      Capillary Refill: Capillary refill takes less than 2 seconds.   Neurological:      General: No focal deficit present.      Mental Status: She is alert and oriented to person, place, and time.      Gait: Gait abnormal (slowed, ambulates with walker).   Psychiatric:         Mood and Affect: Mood normal.         Behavior: Behavior normal.         Thought Content: Thought content normal.         Cognition and Memory: Cognition normal.       Assessment & Plan   Diagnoses and all orders for this visit:    1. Muscle spasm (Primary)    2. Lumbar facet arthropathy  -     HYDROcodone-acetaminophen (NORCO) 5-325 MG per tablet; Take 1 tablet by mouth 2 (Two) Times a Day As Needed for Pain.  Dispense: 60 tablet; Refill: 0    3. Left lumbar radiculopathy  -     HYDROcodone-acetaminophen (NORCO) 5-325 MG per tablet; Take 1 tablet by mouth 2 (Two) Times a Day As Needed for Pain.  Dispense: 60 tablet; Refill: 0  -     gabapentin (NEURONTIN) 100 MG capsule; Take 1 capsule by mouth 3 (Three) Times a Day.  Dispense: 90 capsule; Refill: 2    4. Chronic left-sided low back pain with left-sided sciatica  -     HYDROcodone-acetaminophen (NORCO) 5-325 MG per tablet; Take 1 tablet by mouth 2 (Two) Times a Day As Needed for Pain.  Dispense: 60 tablet; Refill: 0    5. Chronic pain syndrome    6. Encounter for long-term (current) drug use      Shanique Martinez reports a pain score of 1.  Given her pain assessment as noted, treatment options were discussed and the following options were decided upon as a follow-up plan to  address the patient's pain: continuation of current treatment plan for pain, prescription for non-opiod analgesics, and prescription for opiod analgesics.    --- The urine drug screen confirmation from 10/28/24 has been reviewed and the result is appropriate based on patient history and ELE report  --- The patient signed an updated copy of the controlled substance agreement on 4/22/24   --- Continue Flexeril and Gabapentin. No refills needed at this time.   --- Continue Hydrocodone.  DNF 2/19/25. Patient appears stable with current regimen. No adverse effects. Regarding continuation of opioids, there is no evidence of aberrant behavior or any red flags.  The patient continues with appropriate response to opioid therapy. ADL's remain intact by self.   --- Repeat Left L3-L5 RFA when needed  --- Follow-up 3 months or sooner if needed      ELE REPORT  As part of the patient's treatment plan, I am prescribing controlled substances. The patient has been made aware of appropriate use of such medications, including potential risk of somnolence, limited ability to drive and/or work safely, and the potential for dependence or overdose. It has also been made clear that these medications are for use by this patient only, without concomitant use of alcohol or other substances unless prescribed.     Patient has completed prescribing agreement detailing terms of continued prescribing of controlled substances, including monitoring ELE reports, urine drug screening, and pill counts if necessary. The patient is aware that inappropriate use will results in cessation of prescribing such medications.    As the clinician, I personally reviewed the ELE from 2/17/25 while the patient was in the office today.    History and physical exam exhibit continued safe and appropriate use of controlled substances.    Dictated utilizing Dragon dictation.

## 2025-03-24 DIAGNOSIS — G89.29 CHRONIC LEFT-SIDED LOW BACK PAIN WITH LEFT-SIDED SCIATICA: ICD-10-CM

## 2025-03-24 DIAGNOSIS — M47.816 LUMBAR FACET ARTHROPATHY: ICD-10-CM

## 2025-03-24 DIAGNOSIS — M54.42 CHRONIC LEFT-SIDED LOW BACK PAIN WITH LEFT-SIDED SCIATICA: ICD-10-CM

## 2025-03-24 DIAGNOSIS — M54.16 LEFT LUMBAR RADICULOPATHY: ICD-10-CM

## 2025-03-24 RX ORDER — HYDROCODONE BITARTRATE AND ACETAMINOPHEN 5; 325 MG/1; MG/1
1 TABLET ORAL 2 TIMES DAILY PRN
Qty: 60 TABLET | Refills: 0 | Status: SHIPPED | OUTPATIENT
Start: 2025-03-24

## 2025-03-28 DIAGNOSIS — G89.29 CHRONIC LEFT-SIDED LOW BACK PAIN WITH LEFT-SIDED SCIATICA: ICD-10-CM

## 2025-03-28 DIAGNOSIS — M62.838 MUSCLE SPASM: ICD-10-CM

## 2025-03-28 DIAGNOSIS — M54.42 CHRONIC LEFT-SIDED LOW BACK PAIN WITH LEFT-SIDED SCIATICA: ICD-10-CM

## 2025-03-28 RX ORDER — CYCLOBENZAPRINE HCL 10 MG
10 TABLET ORAL 3 TIMES DAILY
Qty: 90 TABLET | Refills: 1 | Status: SHIPPED | OUTPATIENT
Start: 2025-03-28

## 2025-03-31 RX ORDER — ETODOLAC 200 MG/1
200 CAPSULE ORAL 3 TIMES DAILY
Qty: 90 CAPSULE | Refills: 1 | Status: SHIPPED | OUTPATIENT
Start: 2025-03-31

## 2025-03-31 NOTE — TELEPHONE ENCOUNTER
Rx Refill Note  Requested Prescriptions     Pending Prescriptions Disp Refills    etodolac (LODINE) 200 MG capsule [Pharmacy Med Name: ETODOLAC 200 MG CAPSULE] 90 capsule 1     Sig: TAKE 1 CAPSULE BY MOUTH 3 TIMES A DAY      Last office visit with prescribing clinician: 12/16/2024  Next office visit with prescribing clinician: 6/23/2025         Sonja Goff MA  03/31/25, 09:50 EDT

## 2025-04-10 DIAGNOSIS — I87.303 STASIS EDEMA OF BOTH LOWER EXTREMITIES: ICD-10-CM

## 2025-04-10 DIAGNOSIS — E78.5 HYPERLIPIDEMIA LDL GOAL <130: ICD-10-CM

## 2025-04-10 RX ORDER — FUROSEMIDE 20 MG/1
20 TABLET ORAL EVERY MORNING
Qty: 90 TABLET | Refills: 1 | Status: SHIPPED | OUTPATIENT
Start: 2025-04-10

## 2025-04-10 RX ORDER — ATORVASTATIN CALCIUM 10 MG/1
10 TABLET, FILM COATED ORAL DAILY
Qty: 90 TABLET | Refills: 4 | Status: SHIPPED | OUTPATIENT
Start: 2025-04-10

## 2025-04-23 DIAGNOSIS — M54.42 CHRONIC LEFT-SIDED LOW BACK PAIN WITH LEFT-SIDED SCIATICA: ICD-10-CM

## 2025-04-23 DIAGNOSIS — M47.816 LUMBAR FACET ARTHROPATHY: ICD-10-CM

## 2025-04-23 DIAGNOSIS — G89.29 CHRONIC LEFT-SIDED LOW BACK PAIN WITH LEFT-SIDED SCIATICA: ICD-10-CM

## 2025-04-23 DIAGNOSIS — M54.16 LEFT LUMBAR RADICULOPATHY: ICD-10-CM

## 2025-04-24 RX ORDER — HYDROCODONE BITARTRATE AND ACETAMINOPHEN 5; 325 MG/1; MG/1
1 TABLET ORAL 2 TIMES DAILY PRN
Qty: 60 TABLET | Refills: 0 | Status: SHIPPED | OUTPATIENT
Start: 2025-04-24

## 2025-04-28 DIAGNOSIS — I87.303 STASIS EDEMA OF BOTH LOWER EXTREMITIES: ICD-10-CM

## 2025-04-28 RX ORDER — POTASSIUM CHLORIDE 750 MG/1
10 TABLET, EXTENDED RELEASE ORAL DAILY
Qty: 90 TABLET | Refills: 1 | Status: SHIPPED | OUTPATIENT
Start: 2025-04-28

## 2025-05-06 ENCOUNTER — TRANSCRIBE ORDERS (OUTPATIENT)
Dept: INTERNAL MEDICINE | Facility: CLINIC | Age: 89
End: 2025-05-06
Payer: MEDICARE

## 2025-05-06 DIAGNOSIS — Z12.31 BREAST CANCER SCREENING BY MAMMOGRAM: Primary | ICD-10-CM

## 2025-05-19 ENCOUNTER — OFFICE VISIT (OUTPATIENT)
Dept: PAIN MEDICINE | Facility: CLINIC | Age: 89
End: 2025-05-19
Payer: MEDICARE

## 2025-05-19 VITALS
HEART RATE: 75 BPM | OXYGEN SATURATION: 97 % | WEIGHT: 173.4 LBS | TEMPERATURE: 96 F | HEIGHT: 64 IN | SYSTOLIC BLOOD PRESSURE: 132 MMHG | BODY MASS INDEX: 29.6 KG/M2 | DIASTOLIC BLOOD PRESSURE: 68 MMHG

## 2025-05-19 DIAGNOSIS — G89.4 CHRONIC PAIN SYNDROME: Primary | ICD-10-CM

## 2025-05-19 DIAGNOSIS — Z79.899 ENCOUNTER FOR LONG-TERM (CURRENT) DRUG USE: ICD-10-CM

## 2025-05-19 DIAGNOSIS — M47.816 LUMBAR FACET ARTHROPATHY: ICD-10-CM

## 2025-05-19 DIAGNOSIS — M54.16 LEFT LUMBAR RADICULOPATHY: ICD-10-CM

## 2025-05-19 DIAGNOSIS — G89.29 CHRONIC LEFT-SIDED LOW BACK PAIN WITH LEFT-SIDED SCIATICA: ICD-10-CM

## 2025-05-19 DIAGNOSIS — M54.42 CHRONIC LEFT-SIDED LOW BACK PAIN WITH LEFT-SIDED SCIATICA: ICD-10-CM

## 2025-05-19 DIAGNOSIS — M62.838 MUSCLE SPASM: ICD-10-CM

## 2025-05-19 LAB
POC AMPHETAMINES: NEGATIVE
POC BARBITURATES: NEGATIVE
POC BENZODIAZEPHINES: NEGATIVE
POC BUPRENORPHINE: NEGATIVE
POC COCAINE: NEGATIVE
POC METHADONE: NEGATIVE
POC METHAMPHETAMINE SCREEN URINE: NEGATIVE
POC OPIATES: POSITIVE
POC OXYCODONE: NEGATIVE
POC PHENCYCLIDINE: NEGATIVE
POC THC: NEGATIVE

## 2025-05-19 PROCEDURE — 1159F MED LIST DOCD IN RCRD: CPT

## 2025-05-19 PROCEDURE — 1160F RVW MEDS BY RX/DR IN RCRD: CPT

## 2025-05-19 PROCEDURE — 99213 OFFICE O/P EST LOW 20 MIN: CPT

## 2025-05-19 PROCEDURE — 1125F AMNT PAIN NOTED PAIN PRSNT: CPT

## 2025-05-19 PROCEDURE — 80305 DRUG TEST PRSMV DIR OPT OBS: CPT

## 2025-05-19 RX ORDER — GABAPENTIN 100 MG/1
100 CAPSULE ORAL 3 TIMES DAILY
Qty: 90 CAPSULE | Refills: 2 | Status: SHIPPED | OUTPATIENT
Start: 2025-05-19

## 2025-05-19 RX ORDER — HYDROCODONE BITARTRATE AND ACETAMINOPHEN 5; 325 MG/1; MG/1
1 TABLET ORAL 2 TIMES DAILY PRN
Qty: 60 TABLET | Refills: 0 | Status: SHIPPED | OUTPATIENT
Start: 2025-05-19

## 2025-05-19 RX ORDER — CYCLOBENZAPRINE HCL 10 MG
10 TABLET ORAL 3 TIMES DAILY
Qty: 90 TABLET | Refills: 1 | Status: SHIPPED | OUTPATIENT
Start: 2025-05-19

## 2025-05-19 NOTE — PROGRESS NOTES
CHIEF COMPLAINT  Back pain    Subjective   Shanique Martinez is a 89 y.o. female  who presents for follow-up.  She has a history of chronic low back pain. Today pain is 2/10VAS in severity. Pain is located in her low back, mostly on the left side and bilateral knees. She denies lower extremity pain at this time.  Describes this pain as an intermittent ache. Pain is worsened by increased physical activity, household chores  that require twisting or prolonged standing, and walking long distances. Pain improves with rest/reposition, use of a TENS unit, and medications.      She continues with Hydrocodone 5mg 1-2/day, Gabapentin 100mg TID, Etodolac 200mg TID, Tylenol Arthritis PRN and Flexeril 10mg 2-3/day. Denies any side effects from the regimen, including constipation and somnolence. Denies any bowel or bladder changes.      Patient saw Dr. Ludwig St on 8/2/2022 - recommended patient avoid surgery     She gets bilateral knee gel injections with Dr. Clark every 6 months. Last injections in January.     She reports no changes to her health or medication since her last office visit.      Procedures:  6/15/23 - Left L3-L5 RFA - 50% ongoing relief to back pain  5/25/23 - Left L3-L5 MBB - 80% relief x 2 days  3/23/23 - Left L3-L5 MBB -  80% relief x 4 days  1/5/23 - L4/L5 LESI - 80% ongoing relief  10/27/22 - Left L4 TF LESI - 95% relief x 2 week    Back Pain  Chronicity:  Chronic  Frequency:  Intermittently  Progression since onset:  Unchanged  Pain location:  Lumbar spine (L side > R side)  Pain quality:  Aching  Radiates to:  Left thigh (left lateral/posterior thigh)  Pain-numeric:  1/10 (severity in pain varies based on activity level)  Aggravated by:  Standing and position (increased physical activity, prolonged standing, walking long distances)  Associated symptoms: numbness (tips of fingers intermittently right hand) and weakness (bilateral knee)    Associated symptoms: no abdominal pain, no chest pain, no  "dysuria, no fever and no headaches    Risk factors:  Sedentary lifestyle  Treatments tried:  Muscle relaxant and NSAIDs (rest/reposition, PT in the past, TENS unit, Tylenol, Lodine, Flexeril)    PEG Assessment   What number best describes your pain on average in the past week?2  What number best describes how, during the past week, pain has interfered with your enjoyment of life?2  What number best describes how, during the past week, pain has interfered with your general activity?  2    Review of Pertinent Medical Data ---    The following portions of the patient's history were reviewed and updated as appropriate: allergies, current medications, past family history, past medical history, past social history, past surgical history, and problem list.    Review of Systems   Constitutional:  Negative for chills and fever.   Respiratory:  Negative for cough and shortness of breath.    Cardiovascular:  Negative for chest pain.   Gastrointestinal:  Negative for abdominal pain, constipation and diarrhea.   Genitourinary:  Negative for difficulty urinating and dysuria.   Musculoskeletal:  Positive for back pain and gait problem (ambulates with a walker).   Neurological:  Positive for weakness (bilateral knee) and numbness (tips of fingers intermittently right hand). Negative for dizziness, light-headedness and headaches.     I have reviewed and confirmed the accuracy of the ROS as documented by the MA/LPN/RN LALITHA Best    Vitals:    05/19/25 1015   BP: 132/68   BP Location: Left arm   Patient Position: Sitting   Pulse: 75   Temp: 96 °F (35.6 °C)   TempSrc: Temporal   SpO2: 97%   Weight: 78.7 kg (173 lb 6.4 oz)   Height: 162.6 cm (64\")   PainSc: 2      Objective   Physical Exam  Constitutional:       Appearance: Normal appearance.   HENT:      Head: Normocephalic.   Cardiovascular:      Rate and Rhythm: Normal rate and regular rhythm.   Pulmonary:      Effort: Pulmonary effort is normal.      Breath sounds: " Normal breath sounds.   Musculoskeletal:      Cervical back: Tenderness: left side. Muscular tenderness present.      Lumbar back: Tenderness and bony tenderness present. Decreased range of motion. Negative right straight leg raise test and negative left straight leg raise test.      Right knee: Decreased range of motion. Tenderness present.      Left knee: Decreased range of motion. Tenderness present.      Right lower leg: Edema present.      Left lower leg: Edema present.      Comments: + lumbar facet loading/tenderness     Skin:     General: Skin is warm and dry.      Capillary Refill: Capillary refill takes less than 2 seconds.   Neurological:      General: No focal deficit present.      Mental Status: She is alert and oriented to person, place, and time.      Gait: Gait abnormal (slowed, ambulates with walker).   Psychiatric:         Mood and Affect: Mood normal.         Behavior: Behavior normal.         Thought Content: Thought content normal.         Cognition and Memory: Cognition normal.       Assessment & Plan   Diagnoses and all orders for this visit:    1. Chronic pain syndrome (Primary)    2. Muscle spasm  -     cyclobenzaprine (FLEXERIL) 10 MG tablet; Take 1 tablet by mouth 3 (Three) Times a Day.  Dispense: 90 tablet; Refill: 1    3. Left lumbar radiculopathy  -     gabapentin (NEURONTIN) 100 MG capsule; Take 1 capsule by mouth 3 (Three) Times a Day.  Dispense: 90 capsule; Refill: 2  -     HYDROcodone-acetaminophen (NORCO) 5-325 MG per tablet; Take 1 tablet by mouth 2 (Two) Times a Day As Needed for Pain.  Dispense: 60 tablet; Refill: 0    4. Lumbar facet arthropathy  -     HYDROcodone-acetaminophen (NORCO) 5-325 MG per tablet; Take 1 tablet by mouth 2 (Two) Times a Day As Needed for Pain.  Dispense: 60 tablet; Refill: 0    5. Chronic left-sided low back pain with left-sided sciatica  -     HYDROcodone-acetaminophen (NORCO) 5-325 MG per tablet; Take 1 tablet by mouth 2 (Two) Times a Day As Needed for  Pain.  Dispense: 60 tablet; Refill: 0    6. Encounter for long-term (current) drug use      Shanique Martinez reports a pain score of 2.  Given her pain assessment as noted, treatment options were discussed and the following options were decided upon as a follow-up plan to address the patient's pain: continuation of current treatment plan for pain, prescription for non-opiod analgesics, and prescription for opiod analgesics.    --- Routine UDS in office today as part of monitoring requirements for controlled substances.  The specimen was viewed and the immunoassay result reviewed and is +OPI.  This specimen will be sent to [facility] for confirmation.      --- The patient signed an updated copy of the controlled substance agreement on 5/19/25  --- Continue Flexeril and Gabapentin. Refills sent to pharmacy.   --- Continue Hydrocodone.  DNF 5/24/25. Patient appears stable with current regimen. No adverse effects. Regarding continuation of opioids, there is no evidence of aberrant behavior or any red flags.  The patient continues with appropriate response to opioid therapy. ADL's remain intact by self.   --- Repeat Left L3-L5 RFA when needed  --- Follow-up 3 months or sooner if needed      ELE REPORT  As part of the patient's treatment plan, I am prescribing controlled substances. The patient has been made aware of appropriate use of such medications, including potential risk of somnolence, limited ability to drive and/or work safely, and the potential for dependence or overdose. It has also been made clear that these medications are for use by this patient only, without concomitant use of alcohol or other substances unless prescribed.     Patient has completed prescribing agreement detailing terms of continued prescribing of controlled substances, including monitoring ELE reports, urine drug screening, and pill counts if necessary. The patient is aware that inappropriate use will results in cessation of  prescribing such medications.    As the clinician, I personally reviewed the ELE from 5/19/25 while the patient was in the office today.    History and physical exam exhibit continued safe and appropriate use of controlled substances.    Dictated utilizing Dragon dictation.

## 2025-05-29 DIAGNOSIS — G89.29 CHRONIC LEFT-SIDED LOW BACK PAIN WITH LEFT-SIDED SCIATICA: ICD-10-CM

## 2025-05-29 DIAGNOSIS — M54.42 CHRONIC LEFT-SIDED LOW BACK PAIN WITH LEFT-SIDED SCIATICA: ICD-10-CM

## 2025-05-29 RX ORDER — ETODOLAC 200 MG/1
200 CAPSULE ORAL 3 TIMES DAILY
Qty: 90 CAPSULE | Refills: 1 | Status: SHIPPED | OUTPATIENT
Start: 2025-05-29

## 2025-05-29 NOTE — TELEPHONE ENCOUNTER
Rx Refill Note  Requested Prescriptions     Pending Prescriptions Disp Refills    etodolac (LODINE) 200 MG capsule [Pharmacy Med Name: ETODOLAC 200 MG CAPSULE] 90 capsule 1     Sig: TAKE 1 CAPSULE BY MOUTH 3 TIMES A DAY      Last office visit with prescribing clinician: 12/16/2024  Next office visit with prescribing clinician: 6/23/2025         Sonja Goff MA  05/29/25, 09:50 EDT

## 2025-06-23 ENCOUNTER — OFFICE VISIT (OUTPATIENT)
Dept: INTERNAL MEDICINE | Facility: CLINIC | Age: 89
End: 2025-06-23
Payer: MEDICARE

## 2025-06-23 VITALS
HEART RATE: 90 BPM | DIASTOLIC BLOOD PRESSURE: 68 MMHG | BODY MASS INDEX: 29.53 KG/M2 | SYSTOLIC BLOOD PRESSURE: 130 MMHG | OXYGEN SATURATION: 97 % | HEIGHT: 64 IN | WEIGHT: 173 LBS

## 2025-06-23 DIAGNOSIS — M19.90 ARTHRITIS: Chronic | ICD-10-CM

## 2025-06-23 DIAGNOSIS — E55.9 VITAMIN D DEFICIENCY: ICD-10-CM

## 2025-06-23 DIAGNOSIS — G89.29 CHRONIC LEFT-SIDED LOW BACK PAIN WITH LEFT-SIDED SCIATICA: Chronic | ICD-10-CM

## 2025-06-23 DIAGNOSIS — M54.16 LEFT LUMBAR RADICULOPATHY: ICD-10-CM

## 2025-06-23 DIAGNOSIS — Z78.0 MENOPAUSE: ICD-10-CM

## 2025-06-23 DIAGNOSIS — I87.303 STASIS EDEMA OF BOTH LOWER EXTREMITIES: Chronic | ICD-10-CM

## 2025-06-23 DIAGNOSIS — E78.5 HYPERLIPIDEMIA LDL GOAL <130: Chronic | ICD-10-CM

## 2025-06-23 DIAGNOSIS — M54.42 CHRONIC LEFT-SIDED LOW BACK PAIN WITH LEFT-SIDED SCIATICA: Chronic | ICD-10-CM

## 2025-06-23 DIAGNOSIS — Z00.00 MEDICARE ANNUAL WELLNESS VISIT, SUBSEQUENT: Primary | ICD-10-CM

## 2025-06-23 LAB
25(OH)D3+25(OH)D2 SERPL-MCNC: 28 NG/ML (ref 30–100)
ALBUMIN SERPL-MCNC: 3.8 G/DL (ref 3.5–5.2)
ALBUMIN/GLOB SERPL: 1.3 G/DL
ALP SERPL-CCNC: 147 U/L (ref 39–117)
ALT SERPL-CCNC: 9 U/L (ref 1–33)
AST SERPL-CCNC: 21 U/L (ref 1–32)
BILIRUB SERPL-MCNC: 0.4 MG/DL (ref 0–1.2)
BUN SERPL-MCNC: 14 MG/DL (ref 8–23)
BUN/CREAT SERPL: 14.7 (ref 7–25)
CALCIUM SERPL-MCNC: 9.9 MG/DL (ref 8.6–10.5)
CHLORIDE SERPL-SCNC: 102 MMOL/L (ref 98–107)
CHOLEST SERPL-MCNC: 181 MG/DL (ref 0–200)
CO2 SERPL-SCNC: 25.8 MMOL/L (ref 22–29)
CREAT SERPL-MCNC: 0.95 MG/DL (ref 0.57–1)
EGFRCR SERPLBLD CKD-EPI 2021: 57.7 ML/MIN/1.73
ERYTHROCYTE [DISTWIDTH] IN BLOOD BY AUTOMATED COUNT: 11.8 % (ref 12.3–15.4)
GLOBULIN SER CALC-MCNC: 2.9 GM/DL
GLUCOSE SERPL-MCNC: 82 MG/DL (ref 65–99)
HCT VFR BLD AUTO: 30.3 % (ref 34–46.6)
HDLC SERPL-MCNC: 67 MG/DL (ref 40–60)
HGB BLD-MCNC: 9.7 G/DL (ref 12–15.9)
LDLC SERPL CALC-MCNC: 101 MG/DL (ref 0–100)
LDLC/HDLC SERPL: 1.49 {RATIO}
MCH RBC QN AUTO: 28.6 PG (ref 26.6–33)
MCHC RBC AUTO-ENTMCNC: 32 G/DL (ref 31.5–35.7)
MCV RBC AUTO: 89.4 FL (ref 79–97)
PLATELET # BLD AUTO: 427 10*3/MM3 (ref 140–450)
POTASSIUM SERPL-SCNC: 4.7 MMOL/L (ref 3.5–5.2)
PROT SERPL-MCNC: 6.7 G/DL (ref 6–8.5)
RBC # BLD AUTO: 3.39 10*6/MM3 (ref 3.77–5.28)
SODIUM SERPL-SCNC: 140 MMOL/L (ref 136–145)
TRIGL SERPL-MCNC: 71 MG/DL (ref 0–150)
VLDLC SERPL CALC-MCNC: 13 MG/DL (ref 5–40)
WBC # BLD AUTO: 6.84 10*3/MM3 (ref 3.4–10.8)

## 2025-06-23 PROCEDURE — 1125F AMNT PAIN NOTED PAIN PRSNT: CPT | Performed by: NURSE PRACTITIONER

## 2025-06-23 PROCEDURE — 1170F FXNL STATUS ASSESSED: CPT | Performed by: NURSE PRACTITIONER

## 2025-06-23 PROCEDURE — G0439 PPPS, SUBSEQ VISIT: HCPCS | Performed by: NURSE PRACTITIONER

## 2025-06-23 PROCEDURE — 99214 OFFICE O/P EST MOD 30 MIN: CPT | Performed by: NURSE PRACTITIONER

## 2025-06-23 PROCEDURE — 1159F MED LIST DOCD IN RCRD: CPT | Performed by: NURSE PRACTITIONER

## 2025-06-23 PROCEDURE — 1160F RVW MEDS BY RX/DR IN RCRD: CPT | Performed by: NURSE PRACTITIONER

## 2025-06-23 NOTE — PROGRESS NOTES
The ABCs of the Annual Wellness Visit  Subsequent Medicare Wellness Visit    Subjective    Shanique Martinez is a 89 y.o. female who presents for a Subsequent Medicare Wellness Visit.    The following portions of the patient's history were reviewed and   updated as appropriate: allergies, current medications, past family history, past medical history, past social history, past surgical history, and problem list.    Wt Readings from Last 4 Encounters:   06/23/25 78.5 kg (173 lb)   05/19/25 78.7 kg (173 lb 6.4 oz)   02/17/25 76.2 kg (168 lb)   12/16/24 73.2 kg (161 lb 6.4 oz)       Weight trend is worsening.    Her cardiovascular risks are:    [] No Known risk factors  [] Known CAD and being treated     [] Hypertension    [x] Hyperlipidemia  [] Diabetes     [] Obesity  [] Family history    [] Current or hx tobacco use  [] Sedentary lifestyle       Male:   [] Testosterone use     Mobility    [] Ambulates independently  [] Ambulates with cane   [] Ambulates with quad cane  [x] Ambulates with rollator   [] Ambulates with walker   [] Mobile with wheelchair   [] Mobile with electric wheelchair     Continence    [x] Continent of bowel and bladder  [] Incontinent bowel and bladder   [] Incontinent bladder   [] Incontinent bowel      Social       Compared to one year ago, the patient feels her physical   health is the same.    Compared to one year ago, the patient feels her mental   health is the same.    Recent Hospitalizations:  She was not admitted to the hospital during the last year.       Current Medical Providers:  Patient Care Team:  Alvarez Ron III, NP-C as PCP - General (Family Medicine)  Sigrid Clark MD as Consulting Physician (Orthopedic Surgery)  Kanika Montoya PA (Physician Assistant)    Outpatient Medications Prior to Visit   Medication Sig Dispense Refill    atorvastatin (LIPITOR) 10 MG tablet TAKE 1 TABLET BY MOUTH DAILY 90 tablet 4    CALCIUM PO Take 1 tablet by mouth Daily.       cyclobenzaprine (FLEXERIL) 10 MG tablet Take 1 tablet by mouth 3 (Three) Times a Day. 90 tablet 1    etodolac (LODINE) 200 MG capsule TAKE 1 CAPSULE BY MOUTH 3 TIMES A DAY 90 capsule 1    furosemide (LASIX) 20 MG tablet TAKE 1 TABLET BY MOUTH EVERY MORNING 90 tablet 1    gabapentin (NEURONTIN) 100 MG capsule Take 1 capsule by mouth 3 (Three) Times a Day. 90 capsule 2    HYDROcodone-acetaminophen (NORCO) 5-325 MG per tablet Take 1 tablet by mouth 2 (Two) Times a Day As Needed for Pain. 60 tablet 0    potassium chloride (KLOR-CON M10) 10 MEQ CR tablet TAKE 1 TABLET BY MOUTH DAILY 90 tablet 1    prednisoLONE acetate (PRED FORTE) 1 % ophthalmic suspension        No facility-administered medications prior to visit.       Opioid medication/s are on active medication list.  and I have evaluated her active treatment plan and pain score trends (see table).  Vitals:    06/23/25 0951   PainSc: 9      I have reviewed the chart for potential of high risk medication and harmful drug interactions in the elderly.          Aspirin is not on active medication list.  Aspirin use is not indicated based on review of current medical condition/s. Risk of harm outweighs potential benefits.  .    Patient Active Problem List   Diagnosis    Hyperlipidemia LDL goal <130    SI joint arthritis    Arthritis    Encounter for long-term (current) drug use    H/O hysterectomy for benign disease    Menopause    Dizziness    Chronic left-sided low back pain with left-sided sciatica    Stasis edema of both lower extremities    Left lumbar radiculopathy    Lumbar facet arthropathy    Vitamin D deficiency     Advance Care Planning  (Click this link to access ACP Navigator)      Advance Directive is on file.  ACP discussion was held with the patient during this visit. Patient has an advance directive in EMR which is still valid.      Objective    Vitals:    06/23/25 0951 06/23/25 1026   BP: 156/72 130/68  Comment: manual left   BP Location: Left arm   "  Patient Position: Sitting    Cuff Size: Adult    Pulse: 90    SpO2: 97%    Weight: 78.5 kg (173 lb)    Height: 162.6 cm (64\")    PainSc: 9       Estimated body mass index is 29.7 kg/m² as calculated from the following:    Height as of this encounter: 162.6 cm (64\").    Weight as of this encounter: 78.5 kg (173 lb).    BMI is >= 25 and <30. (Overweight) The following options were offered after discussion;: exercise counseling/recommendations      Jump to Steadi Fall Risk Flowsheet  Gait and Balance Evaluation:  Walker    Does the patient have evidence of cognitive impairment? No          HEALTH RISK ASSESSMENT    Smoking Status:  Social History     Tobacco Use   Smoking Status Never   Smokeless Tobacco Never     Alcohol Consumption:  Social History     Substance and Sexual Activity   Alcohol Use No     Fall Risk Screen:    STEADI Fall Risk Assessment was completed, and patient is at LOW risk for falls.Assessment completed on:2025    Depression Screenin/23/2025     9:52 AM   PHQ-2/PHQ-9 Depression Screening   Little interest or pleasure in doing things Not at all   Feeling down, depressed, or hopeless Not at all   How difficult have these problems made it for you to do your work, take care of things at home, or get along with other people? Not difficult at all       Health Habits and Functional and Cognitive Screenin/23/2025     9:56 AM   Functional & Cognitive Status   Do you have difficulty preparing food and eating? No   Do you have difficulty bathing yourself, getting dressed or grooming yourself? No   Do you have difficulty using the toilet? No   Do you have difficulty moving around from place to place? No   Do you have trouble with steps or getting out of a bed or a chair? No   Current Diet Limited Junk Food   Dental Exam Up to date   Eye Exam Up to date   Exercise (times per week) 0 times per week   Current Exercises Include No Regular Exercise   Do you need help using the phone?  No "   Are you deaf or do you have serious difficulty hearing?  Yes   Do you need help to go to places out of walking distance? No   Do you need help shopping? No   Do you need help preparing meals?  No   Do you need help with housework?  Yes   Do you need help with laundry? Yes   Do you need help taking your medications? No   Do you need help managing money? No   Do you ever drive or ride in a car without wearing a seat belt? No   Have you felt unusual stress, anger or loneliness in the last month? No   Who do you live with? Alone   If you need help, do you have trouble finding someone available to you? No   Have you been bothered in the last four weeks by sexual problems? No   Do you have difficulty concentrating, remembering or making decisions? Yes       Age-appropriate Screening Schedule:  Refer to the list below for future screening recommendations based on patient's age, sex and/or medical conditions. Orders for these recommended tests are listed in the plan section. The patient has been provided with a written plan.    Health Maintenance   Topic Date Due    DXA SCAN  02/09/2024    COVID-19 Vaccine (7 - 2024-25 season) 04/28/2025    ANNUAL WELLNESS VISIT  06/12/2025    LIPID PANEL  06/12/2025    INFLUENZA VACCINE  07/01/2025    TDAP/TD VACCINES (2 - Tdap) 03/02/2032    RSV Vaccine - Adults  Completed    Pneumococcal Vaccine 50+  Completed    HEMOGLOBIN A1C  Discontinued    ZOSTER VACCINE  Discontinued                CMS Preventative Services Quick Reference  Risk Factors Identified During Encounter  Advise flu and covid vaccine every fall.     Mammogram has been ordered and scheduled.   Dexa ordered today: last was normal.     The above risks/problems have been discussed with the patient.  Pertinent information has been shared with the patient in the After Visit Summary.  An After Visit Summary and PPPS were made available to the patient.    Follow Up:   Next Medicare Wellness visit to be scheduled in 1 year.  "      Additional E&M Note during same encounter follows:  Patient has multiple medical problems which are significant and separately identifiable that require additional work above and beyond the Medicare Wellness Visit.      Chief Complaint  Medicare Wellness-subsequent    Subjective        Shanique Martinez is also being seen today for AWV and follow up chronic conditions:       Hyperlipidemia  This is a chronic problem. The current episode started more than 1 year ago. The problem is controlled. She has no history of diabetes or hypothyroidism. There are no known factors aggravating her hyperlipidemia. Pertinent negatives include no chest pain or shortness of breath. Current antihyperlipidemic treatment includes statins.    Risk factors for coronary artery disease include a sedentary lifestyle.     Chronic LBP: followed by pain management  Continues with Hydrocodone 5mg 1-2/day, Gabapentin 100mg TID, Etodolac 200mg TID, Tylenol Arthritis PRN and Flexeril 10mg 2-3/day.   She has been seen by neurosurgery and advised against surgery.         Objective   Vital Signs:  /68 Comment: manual left  Pulse 90   Ht 162.6 cm (64\")   Wt 78.5 kg (173 lb)   SpO2 97%   BMI 29.70 kg/m²     Physical Exam  Vitals reviewed.   Constitutional:       Appearance: Normal appearance. She is well-developed.   Neck:      Thyroid: No thyromegaly.   Cardiovascular:      Rate and Rhythm: Normal rate and regular rhythm.      Pulses: Normal pulses.      Heart sounds: Normal heart sounds.   Pulmonary:      Effort: Pulmonary effort is normal.      Breath sounds: Normal breath sounds.      Comments: E/U   Abdominal:      General: Bowel sounds are normal.   Musculoskeletal:      Cervical back: Normal range of motion and neck supple.      Right lower leg: No edema.      Left lower leg: No edema.   Lymphadenopathy:      Cervical: No cervical adenopathy.   Skin:     General: Skin is warm and dry.      Capillary Refill: Capillary refill takes 2 " to 3 seconds.   Neurological:      Mental Status: She is alert and oriented to person, place, and time.   Psychiatric:         Mood and Affect: Mood normal.         Behavior: Behavior normal. Behavior is cooperative.         Thought Content: Thought content normal.         Judgment: Judgment normal.          The following data was reviewed by: Alvarez Ron III, NP-C on 06/23/2025:  Common labs          12/16/2024    09:35   Common Labs   Glucose 80    BUN 26    Creatinine 1.04    Sodium 142    Potassium 4.7    Chloride 106    Calcium 9.7                 Assessment and Plan     Problem List Items Addressed This Visit          Cardiac and Vasculature    Hyperlipidemia LDL goal <130 (Chronic)    Overview   Current medication: lipitor 10 mg daily          Current Assessment & Plan   Lipid abnormalities are improving with treatment.  Pharmacotherapy as ordered.  Lipids will be reassessed in 6 months.    Lab Results   Component Value Date    CHOL 175 05/03/2017    CHLPL 165 06/12/2024    TRIG 84 06/12/2024    HDL 64 (H) 06/12/2024    LDL 85 06/12/2024             Relevant Orders    Comprehensive Metabolic Panel    Lipid Panel With LDL / HDL Ratio    CBC (No Diff)    Stasis edema of both lower extremities (Chronic)    Current Assessment & Plan   Continue lasix   Labs to monitor          Relevant Orders    Comprehensive Metabolic Panel       Endocrine and Metabolic    Vitamin D deficiency    Relevant Orders    Vitamin D 25 hydroxy       Genitourinary and Reproductive     Menopause    Relevant Orders    DEXA Bone Density Axial       Musculoskeletal and Injuries    Arthritis (Chronic)    Current Assessment & Plan   Continues etodolac.     Always take with food and not when dehydrated (not eating or drinking)          Chronic left-sided low back pain with left-sided sciatica (Chronic)    Current Assessment & Plan   States much improved on current regimen: continue follow up with pain mgt.             Neuro     Left lumbar radiculopathy    Overview   Added automatically from request for surgery [id number]          Other Visit Diagnoses         Medicare annual wellness visit, subsequent    -  Primary                      Follow Up     Return in about 6 months (around 12/23/2025).    Patient was given instructions and counseling regarding her condition or for health maintenance advice. Please see specific information pulled into the AVS if appropriate.

## 2025-06-23 NOTE — PATIENT INSTRUCTIONS
Medicare Wellness  Personal Prevention Plan of Service     Date of Office Visit:    Encounter Provider:  Alvarez Ron III, NP-C  Place of Service:  [facility]  Patient Name: Shanique Martinez  :  1935    As part of the Medicare Wellness portion of your visit today, we are providing you with this personalized preventive plan of services (PPPS). This plan is based upon recommendations of the [place] Preventive Services Task Force (USPSTF) and the Advisory Committee on Immunization Practices (ACIP).    This lists the preventive care services that should be considered, and provides dates of when you are due. Items listed as completed are up-to-date and do not require any further intervention.    Health Maintenance   Topic Date Due    DXA SCAN  2024    COVID-19 Vaccine (2024- season) 2025    ANNUAL WELLNESS VISIT  2025    LIPID PANEL  2025    INFLUENZA VACCINE  2025    TDAP/TD VACCINES (2 - Tdap) 2032    RSV Vaccine - Adults  Completed    Pneumococcal Vaccine 50+  Completed    HEMOGLOBIN A1C  Discontinued    ZOSTER VACCINE  Discontinued       No orders of the defined types were placed in this encounter.      Return in about 6 months (around 2025).

## 2025-06-26 DIAGNOSIS — G89.29 CHRONIC LEFT-SIDED LOW BACK PAIN WITH LEFT-SIDED SCIATICA: ICD-10-CM

## 2025-06-26 DIAGNOSIS — M54.16 LEFT LUMBAR RADICULOPATHY: ICD-10-CM

## 2025-06-26 DIAGNOSIS — M54.42 CHRONIC LEFT-SIDED LOW BACK PAIN WITH LEFT-SIDED SCIATICA: ICD-10-CM

## 2025-06-26 DIAGNOSIS — M47.816 LUMBAR FACET ARTHROPATHY: ICD-10-CM

## 2025-06-26 RX ORDER — HYDROCODONE BITARTRATE AND ACETAMINOPHEN 5; 325 MG/1; MG/1
1 TABLET ORAL 2 TIMES DAILY PRN
Qty: 60 TABLET | Refills: 0 | Status: SHIPPED | OUTPATIENT
Start: 2025-06-26

## 2025-07-02 ENCOUNTER — DOCUMENTATION (OUTPATIENT)
Dept: INTERNAL MEDICINE | Facility: CLINIC | Age: 89
End: 2025-07-02
Payer: MEDICARE

## 2025-07-02 DIAGNOSIS — D64.9 ANEMIA, UNSPECIFIED TYPE: Primary | ICD-10-CM

## 2025-07-02 PROCEDURE — 82274 ASSAY TEST FOR BLOOD FECAL: CPT | Performed by: NURSE PRACTITIONER

## 2025-07-03 LAB — HEMOCCULT STL QL IA: NEGATIVE

## 2025-07-09 DIAGNOSIS — D64.9 NORMOCYTIC ANEMIA: Primary | ICD-10-CM

## 2025-07-25 DIAGNOSIS — M54.42 CHRONIC LEFT-SIDED LOW BACK PAIN WITH LEFT-SIDED SCIATICA: ICD-10-CM

## 2025-07-25 DIAGNOSIS — G89.29 CHRONIC LEFT-SIDED LOW BACK PAIN WITH LEFT-SIDED SCIATICA: ICD-10-CM

## 2025-07-25 DIAGNOSIS — M54.16 LEFT LUMBAR RADICULOPATHY: ICD-10-CM

## 2025-07-25 DIAGNOSIS — M47.816 LUMBAR FACET ARTHROPATHY: ICD-10-CM

## 2025-07-25 RX ORDER — HYDROCODONE BITARTRATE AND ACETAMINOPHEN 5; 325 MG/1; MG/1
1 TABLET ORAL 2 TIMES DAILY PRN
Qty: 60 TABLET | Refills: 0 | Status: SHIPPED | OUTPATIENT
Start: 2025-07-25

## 2025-07-30 DIAGNOSIS — M62.838 MUSCLE SPASM: ICD-10-CM

## 2025-07-30 DIAGNOSIS — M54.42 CHRONIC LEFT-SIDED LOW BACK PAIN WITH LEFT-SIDED SCIATICA: ICD-10-CM

## 2025-07-30 DIAGNOSIS — G89.29 CHRONIC LEFT-SIDED LOW BACK PAIN WITH LEFT-SIDED SCIATICA: ICD-10-CM

## 2025-07-30 RX ORDER — ETODOLAC 200 MG/1
200 CAPSULE ORAL 3 TIMES DAILY
Qty: 90 CAPSULE | Refills: 1 | Status: SHIPPED | OUTPATIENT
Start: 2025-07-30

## 2025-07-30 NOTE — TELEPHONE ENCOUNTER
Rx Refill Note  Requested Prescriptions     Pending Prescriptions Disp Refills    etodolac (LODINE) 200 MG capsule [Pharmacy Med Name: ETODOLAC 200 MG CAPSULE] 90 capsule 1     Sig: TAKE 1 CAPSULE BY MOUTH 3 TIMES A DAY      Last office visit with prescribing clinician: 6/23/2025  Next office visit with prescribing clinician: 12/29/2025         Sonja Goff MA  07/30/25, 15:50 EDT

## 2025-07-31 RX ORDER — CYCLOBENZAPRINE HCL 10 MG
10 TABLET ORAL 3 TIMES DAILY
Qty: 90 TABLET | Refills: 1 | Status: SHIPPED | OUTPATIENT
Start: 2025-07-31

## 2025-08-25 ENCOUNTER — HOSPITAL ENCOUNTER (OUTPATIENT)
Dept: BONE DENSITY | Facility: HOSPITAL | Age: 89
Discharge: HOME | End: 2025-08-25
Payer: MEDICARE

## 2025-08-25 ENCOUNTER — OFFICE VISIT (OUTPATIENT)
Dept: PAIN MEDICINE | Facility: CLINIC | Age: 89
End: 2025-08-25
Payer: MEDICARE

## 2025-08-25 ENCOUNTER — HOSPITAL ENCOUNTER (OUTPATIENT)
Dept: MAMMOGRAPHY | Facility: HOSPITAL | Age: 89
Discharge: HOME | End: 2025-08-25
Payer: MEDICARE

## 2025-08-25 VITALS
HEIGHT: 64 IN | HEART RATE: 94 BPM | OXYGEN SATURATION: 95 % | SYSTOLIC BLOOD PRESSURE: 144 MMHG | RESPIRATION RATE: 16 BRPM | DIASTOLIC BLOOD PRESSURE: 89 MMHG | WEIGHT: 171 LBS | TEMPERATURE: 97.6 F | BODY MASS INDEX: 29.19 KG/M2

## 2025-08-25 DIAGNOSIS — M62.838 MUSCLE SPASM: Primary | ICD-10-CM

## 2025-08-25 DIAGNOSIS — M54.16 LEFT LUMBAR RADICULOPATHY: ICD-10-CM

## 2025-08-25 DIAGNOSIS — G89.4 CHRONIC PAIN SYNDROME: ICD-10-CM

## 2025-08-25 DIAGNOSIS — Z78.0 MENOPAUSE: ICD-10-CM

## 2025-08-25 DIAGNOSIS — M54.42 CHRONIC LEFT-SIDED LOW BACK PAIN WITH LEFT-SIDED SCIATICA: ICD-10-CM

## 2025-08-25 DIAGNOSIS — Z12.31 BREAST CANCER SCREENING BY MAMMOGRAM: ICD-10-CM

## 2025-08-25 DIAGNOSIS — G89.29 CHRONIC LEFT-SIDED LOW BACK PAIN WITH LEFT-SIDED SCIATICA: ICD-10-CM

## 2025-08-25 DIAGNOSIS — Z79.899 ENCOUNTER FOR LONG-TERM (CURRENT) DRUG USE: ICD-10-CM

## 2025-08-25 DIAGNOSIS — M47.816 LUMBAR FACET ARTHROPATHY: ICD-10-CM

## 2025-08-25 PROCEDURE — 77080 DXA BONE DENSITY AXIAL: CPT

## 2025-08-25 PROCEDURE — 99213 OFFICE O/P EST LOW 20 MIN: CPT

## 2025-08-25 PROCEDURE — 1125F AMNT PAIN NOTED PAIN PRSNT: CPT

## 2025-08-25 PROCEDURE — 1160F RVW MEDS BY RX/DR IN RCRD: CPT

## 2025-08-25 PROCEDURE — 77067 SCR MAMMO BI INCL CAD: CPT

## 2025-08-25 PROCEDURE — 1159F MED LIST DOCD IN RCRD: CPT

## 2025-08-25 PROCEDURE — 77063 BREAST TOMOSYNTHESIS BI: CPT

## 2025-08-25 RX ORDER — CYCLOBENZAPRINE HCL 10 MG
10 TABLET ORAL 3 TIMES DAILY
Qty: 90 TABLET | Refills: 1 | Status: SHIPPED | OUTPATIENT
Start: 2025-08-25

## 2025-08-25 RX ORDER — GABAPENTIN 100 MG/1
100 CAPSULE ORAL 3 TIMES DAILY
Qty: 90 CAPSULE | Refills: 2 | Status: SHIPPED | OUTPATIENT
Start: 2025-08-25

## 2025-08-25 RX ORDER — HYDROCODONE BITARTRATE AND ACETAMINOPHEN 5; 325 MG/1; MG/1
1 TABLET ORAL 2 TIMES DAILY PRN
Qty: 60 TABLET | Refills: 0 | Status: SHIPPED | OUTPATIENT
Start: 2025-08-25

## (undated) DEVICE — NDL SPINE 22G 31/2IN BLK

## (undated) DEVICE — EPIDURAL TRAY: Brand: MEDLINE INDUSTRIES, INC.

## (undated) DEVICE — Device: Brand: PORTEX

## (undated) DEVICE — TOWEL,OR,DSP,ST,BLUE,STD,4/PK,20PK/CS: Brand: MEDLINE

## (undated) DEVICE — NDL SPINE 22G 5IN BLK

## (undated) DEVICE — GLV SURG TRIUMPH PF LTX 7.5 STRL

## (undated) DEVICE — CANN NASL O2 INF

## (undated) DEVICE — Device

## (undated) DEVICE — NDL EPID TUOHY W/WINGS 20G 4.5IN